# Patient Record
Sex: MALE | Employment: FULL TIME
[De-identification: names, ages, dates, MRNs, and addresses within clinical notes are randomized per-mention and may not be internally consistent; named-entity substitution may affect disease eponyms.]

---

## 2017-07-08 ENCOUNTER — HEALTH MAINTENANCE LETTER (OUTPATIENT)
Age: 62
End: 2017-07-08

## 2017-08-25 ENCOUNTER — OFFICE VISIT (OUTPATIENT)
Dept: FAMILY MEDICINE | Facility: CLINIC | Age: 62
End: 2017-08-25

## 2017-08-25 ENCOUNTER — TELEPHONE (OUTPATIENT)
Dept: FAMILY MEDICINE | Facility: CLINIC | Age: 62
End: 2017-08-25

## 2017-08-25 VITALS
OXYGEN SATURATION: 96 % | HEART RATE: 52 BPM | BODY MASS INDEX: 31.32 KG/M2 | TEMPERATURE: 97.8 F | WEIGHT: 206 LBS | DIASTOLIC BLOOD PRESSURE: 67 MMHG | SYSTOLIC BLOOD PRESSURE: 123 MMHG | RESPIRATION RATE: 16 BRPM

## 2017-08-25 DIAGNOSIS — Z72.9 LIFESTYLE PROBLEMS: ICD-10-CM

## 2017-08-25 DIAGNOSIS — E78.5 HYPERLIPIDEMIA LDL GOAL <130: ICD-10-CM

## 2017-08-25 DIAGNOSIS — I10 ESSENTIAL HYPERTENSION WITH GOAL BLOOD PRESSURE LESS THAN 140/90: ICD-10-CM

## 2017-08-25 DIAGNOSIS — D12.6 TUBULAR ADENOMA OF COLON: ICD-10-CM

## 2017-08-25 DIAGNOSIS — I10 ESSENTIAL HYPERTENSION WITH GOAL BLOOD PRESSURE LESS THAN 140/90: Primary | ICD-10-CM

## 2017-08-25 LAB
ALBUMIN SERPL-MCNC: 4.2 MG/DL (ref 3.5–4.7)
ALP SERPL-CCNC: 69.2 U/L (ref 31.7–110.7)
ALT SERPL-CCNC: 13.7 U/L (ref 0–45)
AST SERPL-CCNC: 13.7 U/L (ref 0–55)
BILIRUB SERPL-MCNC: 1.4 MG/DL (ref 0.2–1.3)
BUN SERPL-MCNC: 10.9 MG/DL (ref 7–21)
CALCIUM SERPL-MCNC: 9.5 MG/DL (ref 8.5–10.1)
CHLORIDE SERPLBLD-SCNC: 106.9 MMOL/L (ref 98–110)
CHOLEST SERPL-MCNC: 246.4 MG/DL (ref 0–200)
CHOLEST/HDLC SERPL: 5.4 {RATIO} (ref 0–5)
CO2 SERPL-SCNC: 28.9 MMOL/L (ref 20–32)
CREAT SERPL-MCNC: 0.9 MG/DL (ref 0.7–1.3)
GFR SERPL CREATININE-BSD FRML MDRD: >90 ML/MIN/1.7 M2
GLUCOSE SERPL-MCNC: 111.8 MG'DL (ref 70–99)
HDLC SERPL-MCNC: 45.3 MG/DL
LDLC SERPL CALC-MCNC: 170 MG/DL (ref 0–129)
POTASSIUM SERPL-SCNC: 4.3 MMOL/DL (ref 3.3–4.5)
PROT SERPL-MCNC: 7.1 G/DL (ref 6.8–8.8)
SODIUM SERPL-SCNC: 142.6 MMOL/L (ref 132.6–141.4)
TRIGL SERPL-MCNC: 157 MG/DL (ref 0–150)
VLDL CHOLESTEROL: 31.4 MG/DL (ref 7–32)

## 2017-08-25 RX ORDER — AMLODIPINE BESYLATE 10 MG/1
10 TABLET ORAL DAILY
Qty: 90 TABLET | Refills: 3 | Status: SHIPPED | OUTPATIENT
Start: 2017-08-25 | End: 2017-09-05

## 2017-08-25 RX ORDER — ATORVASTATIN CALCIUM 40 MG/1
40 TABLET, FILM COATED ORAL AT BEDTIME
Qty: 90 TABLET | Refills: 3 | Status: SHIPPED | OUTPATIENT
Start: 2017-08-25 | End: 2018-07-30

## 2017-08-25 RX ORDER — HYDROCHLOROTHIAZIDE 25 MG/1
25 TABLET ORAL DAILY
Qty: 90 TABLET | Refills: 3 | Status: SHIPPED | OUTPATIENT
Start: 2017-08-25 | End: 2018-07-30

## 2017-08-25 RX ORDER — ATENOLOL 100 MG/1
100 TABLET ORAL DAILY
Qty: 90 TABLET | Refills: 3 | Status: SHIPPED | OUTPATIENT
Start: 2017-08-25 | End: 2017-08-28

## 2017-08-25 NOTE — LETTER
August 25, 2017      Wilian Ness  [address]  [facility] [address]        Dear Wilian,    Thank you for getting your care at [facility]. Please see below for your test results.  Yes - your cholesterol was very high off the atrovastatin (lipitor). Restart it and we will recheck the cholesterol in 6 months when we visit. All other tests were stable.    Resulted Orders   Lipid Cascade ([facility])   Result Value Ref Range    Cholesterol 246.4 (H) 0.0 - 200.0 mg/dL    Cholesterol/HDL Ratio 5.4 (H) 0.0 - 5.0    HDL Cholesterol 45.3 >40.0 mg/dL    LDL Cholesterol Calculated 170 (H) 0 - 129 mg/dL    Triglycerides 157.0 (H) 0.0 - 150.0 mg/dL    VLDL Cholesterol 31.4 7.0 - 32.0 mg/dL   Comprehensive Metabolic Panel ([facility])   Result Value Ref Range    Albumin 4.2 3.5 - 4.7 mg/dL    Alkaline Phosphatase 69.2 31.7 - 110.7 U/L    ALT 13.7 0.0 - 45.0 U/L    AST 13.7 0.0 - 55.0 U/L    Bilirubin Total 1.4 (H) 0.2 - 1.3 mg/dL    Urea Nitrogen 10.9 7.0 - 21.0 mg/dL    Calcium 9.5 8.5 - 10.1 mg/dL    Chloride 106.9 98.0 - 110.0 mmol/L    Carbon Dioxide 28.9 20.0 - 32.0 mmol/L    Creatinine 0.9 0.7 - 1.3 mg/dL    Glucose 111.8 (H) 70.0 - 99.0 mg'dL    Potassium 4.3 3.3 - 4.5 mmol/dL    Sodium 142.6 (H) 132.6 - 141.4 mmol/L    Protein Total 7.1 6.8 - 8.8 g/dL    GFR Estimate >90 >60.0 mL/min/1.7 m2    GFR Estimate If Black >90 >60.0 mL/min/1.7 m2       Sincerely,  Bran Loza MD

## 2017-08-25 NOTE — TELEPHONE ENCOUNTER
[facility] phone call message- medication clarification/question:    Full Medication Name: atenolol (TENORMIN) 100 MG tablet   Dose: Sig: Take 1 tablet (100 mg) by mouth daily    Question: Requesting generic brand.  States per pharmacy, company that makes prescription will not have supply until at least November.  Patient also states that they have a 25 day supply remaining, and requests call once pharmacy receives new prescription.    Pharmacy confirmed as [facility] [address] [place] [address] AT [address]: Yes    OK to leave a message on voice mail? Yes    Primary language: English      needed? No    Call taken on August 25, 2017 at 2:24 PM by Mimi Odonnell

## 2017-08-25 NOTE — MR AVS SNAPSHOT
After Visit Summary   2017    Wilian Ness    MRN: [id number]           Patient Information     Date Of Birth          1955        Visit Information        Provider Department      2017 10:40 AM Bran Loza MD [facility]        Today's Diagnoses     Essential hypertension with goal blood pressure less than 140/90    -  1    Hyperlipidemia LDL goal <130        Tubular adenoma of colon        Lifestyle problems           Follow-ups after your visit        Who to contact     Please call your clinic at [phone] to:    Ask questions about your health    Make or cancel appointments    Discuss your medicines    Learn about your test results    Speak to your doctor   If you have compliments or concerns about an experience at your clinic, or if you wish to file a complaint, please contact [facility] Patient Relations at [phone] or email us at [email or web address]         Additional Information About Your Visit        MyChart Information     [employer] is an electronic gateway that provides easy, online access to your medical records. With [employer], you can request a clinic appointment, read your test results, renew a prescription or communicate with your care team.     To sign up for [employer] visit the website at [email or web address]   You will be asked to enter the access code listed below, as well as some personal information. Please follow the directions to create your username and password.     Your access code is: [email or web address]  Expires: 2017 11:11 AM     Your access code will  in 90 days. If you need help or a new code, please contact your [facility] or call [phone] for assistance.        Care EveryWhere ID     This is your Care EveryWhere ID. This could be used by other organizations to access your [place] medical records  [id number]        Your Vitals Were      Pulse Temperature Respirations Pulse Oximetry BMI (Body Mass Index)       52 97.8  F (36.6  C) (Oral) 16 96% 31.32 kg/m2        Blood Pressure from Last 3 Encounters:   08/25/17 123/67   09/16/16 157/90   09/01/15 (!) 169/94    Weight from Last 3 Encounters:   08/25/17 206 lb (93.4 kg)   09/16/16 205 lb (93 kg)   09/01/15 200 lb 3.2 oz (90.8 kg)              We Performed the Following     Comprehensive Metabolic Panel ([facility])     Hepatitis C antibody     Lipid Cascade ([facility])          Where to get your medicines      These medications were sent to [email or web address] [address] [place] [address] AT [address] [address]    Hours:  24-hours Phone:  [phone]     amLODIPine 10 MG tablet    aspirin 81 MG tablet    atenolol 100 MG tablet    atorvastatin 40 MG tablet    hydrochlorothiazide 25 MG tablet          Primary Care Provider Office Phone # Fax #    Bran Loza [phone] [address] [facility] [address]        Equal Access to Services     MATT DRAKE AH: Hadii riddhi kelly hadasho Soomaali, waaxda luqadaha, qaybta kaalmada adeegyada, brian rubio hayjaxon laws. So [facility] [phone].    ATENCIÓN: Si habla español, tiene a werner disposición servicios gratuitos de asistencia lingüística. [facility] [phone].    We comply with applicable federal civil rights laws and [place] laws. We do not discriminate on the basis of race, color, national origin, age, disability sex, sexual orientation or gender identity.            Thank you!     Thank you for choosing [facility]  for your care. Our goal is always to provide you with excellent care. Hearing back from our patients is one way we can continue to improve our services. Please take a few minutes to complete the written survey that you may receive in the mail after your visit with us. Thank you!             Your Updated  Medication List - Protect others around you: Learn how to safely use, store and throw away your medicines at www.disposemymeds.org.          This list is accurate as of: 8/25/17 11:11 AM.  Always use your most recent med list.                   Brand Name Dispense Instructions for use Diagnosis    amLODIPine 10 MG tablet    NORVASC    90 tablet    Take 1 tablet (10 mg) by mouth daily    Essential hypertension with goal blood pressure less than 140/90       aspirin 81 MG tablet     100 tablet    Take 1 tablet (81 mg) by mouth daily    Essential hypertension with goal blood pressure less than 140/90       atenolol 100 MG tablet    TENORMIN    90 tablet    Take 1 tablet (100 mg) by mouth daily    Essential hypertension with goal blood pressure less than 140/90       atorvastatin 40 MG tablet    LIPITOR    90 tablet    Take 1 tablet (40 mg) by mouth At Bedtime    Hyperlipidemia LDL goal <130       hydrochlorothiazide 25 MG tablet    HYDRODIURIL    90 tablet    Take 1 tablet (25 mg) by mouth daily    Essential hypertension with goal blood pressure less than 140/90

## 2017-08-25 NOTE — PROGRESS NOTES
HPI  62 year old male here for evaluation of several issues.       1. HTN  Out of HCTZ  Good compliance  No CP, TIA, other sxs of CV disease    2. CHol  Out of meds x 2 weeks    3. Etoh  Multiple beers each evening  No legal, work, relationship issues    4. Colon polyps  Due for colonoscopy  After discussion will postpone until next year      ROS  6 point ROS neg other than the symptoms noted above in the HPI.    MEDS  Current Outpatient Prescriptions   Medication     amLODIPine (NORVASC) 10 MG tablet     atenolol (TENORMIN) 100 MG tablet     aspirin 81 MG tablet     atorvastatin (LIPITOR) 40 MG tablet     hydrochlorothiazide (HYDRODIURIL) 25 MG tablet     No current facility-administered medications for this visit.          SOC      FHx  Family History   Problem Relation Age of Onset     Cardiovascular Father 81     Pulmonary embolus     Cancer - colorectal No family hx of      Prostate Cancer No family hx of        PHYSICAL EXAMINATION:  Vital signs: /67  Pulse 52  Temp 97.8  F (36.6  C) (Oral)  Resp 16  Wt 206 lb (93.4 kg)  SpO2 96%  BMI 31.32 kg/m2    GENERAL:: healthy, alert and no distress  EYES: Eyes grossly normal to inspection, extraocular movements - intact, and PERRL  HENT: ear canals- normal; TMs- normal; Nose- normal; Mouth- no ulcers, no lesions  NECK: no tenderness, no adenopathy, no asymmetry, 3 CM SOFT MOBILE MASS RIGHT NECK, no stiffness; thyroid- normal to palpation  RESP: lungs clear to auscultation - no rales, no rhonchi, no wheezes  CV: regular rates and rhythm, normal S1 S2, no S3 or S4 and no murmur, no click or rub -  ABDOMEN: soft, no tenderness, no  hepatosplenomegaly, no masses, normal bowel sounds  MS: extremities- no gross deformities noted, no edema      LABS    Off lipitor x 2 weeks  Results for orders placed or performed in visit on 08/25/17   Lipid Cascade ([facility])   Result Value Ref Range    Cholesterol 246.4 (H) 0.0 - 200.0 mg/dL    Cholesterol/HDL Ratio 5.4 (H) 0.0  - 5.0    HDL Cholesterol 45.3 >40.0 mg/dL    LDL Cholesterol Calculated 170 (H) 0 - 129 mg/dL    Triglycerides 157.0 (H) 0.0 - 150.0 mg/dL    VLDL Cholesterol 31.4 7.0 - 32.0 mg/dL   Comprehensive Metabolic Panel ([facility])   Result Value Ref Range    Albumin 4.2 3.5 - 4.7 mg/dL    Alkaline Phosphatase 69.2 31.7 - 110.7 U/L    ALT 13.7 0.0 - 45.0 U/L    AST 13.7 0.0 - 55.0 U/L    Bilirubin Total 1.4 (H) 0.2 - 1.3 mg/dL    Urea Nitrogen 10.9 7.0 - 21.0 mg/dL    Calcium 9.5 8.5 - 10.1 mg/dL    Chloride 106.9 98.0 - 110.0 mmol/L    Carbon Dioxide 28.9 20.0 - 32.0 mmol/L    Creatinine 0.9 0.7 - 1.3 mg/dL    Glucose 111.8 (H) 70.0 - 99.0 mg'dL    Potassium 4.3 3.3 - 4.5 mmol/dL    Sodium 142.6 (H) 132.6 - 141.4 mmol/L    Protein Total 7.1 6.8 - 8.8 g/dL    GFR Estimate >90 >60.0 mL/min/1.7 m2    GFR Estimate If Black >90 >60.0 mL/min/1.7 m2           ASSESSMENT/PLAN    1. Essential hypertension with goal blood pressure less than 140/90  BP controlled  Check labs  Refill x 1 year    - Comprehensive Metabolic Panel ([facility])  - amLODIPine (NORVASC) 10 MG tablet; Take 1 tablet (10 mg) by mouth daily  Dispense: 90 tablet; Refill: 3  - atenolol (TENORMIN) 100 MG tablet; Take 1 tablet (100 mg) by mouth daily  Dispense: 90 tablet; Refill: 3  - aspirin 81 MG tablet; Take 1 tablet (81 mg) by mouth daily  Dispense: 100 tablet; Refill: 3  - hydrochlorothiazide (HYDRODIURIL) 25 MG tablet; Take 1 tablet (25 mg) by mouth daily  Dispense: 90 tablet; Refill: 3    2. Hyperlipidemia LDL goal <130  Out of meds x 2 weeks  Likely LDL will be high    - Lipid Cascade ([facility])  - atorvastatin (LIPITOR) 40 MG tablet; Take 1 tablet (40 mg) by mouth At Bedtime  Dispense: 90 tablet; Refill: 3    3. Tubular adenoma of colon  Due to colonoscopy after 2 years  Will postpone until 2018 per pt request    4. Lifestyle problems  - Hepatitis C antibody    5. RTC 6 months  Lipids on meds      ANGELITA VALLEJO

## 2017-08-25 NOTE — LETTER
August 29, 2017      Wilian Ness  [address]  [facility] [address]        Dear Wilian,    We have tried reaching you by telephone to inform you that we followed up with your pharmacy in regards to your Atenolol prescription as requested.     [facility] does not carry this medication due to back order. I checked with [facility] and they are able to get this medication for you. I have sent the prescription to \[facility] for them to process. I would recommend calling the pharmacy to check to see when it is ready to be picked up.      Sincerely,    Lise Lowery RN

## 2017-08-26 NOTE — TELEPHONE ENCOUNTER
OK to give generic atenolol (but I think that is what he getting already)  Please call pharmacy - give verbal order for generic atenolol 100mg QD  See if there is another barrier    If they do not have atenolol supplies, will switch to Toprol XL 100mg QD    Then call patient with outcome of conversation.    Wesley

## 2017-08-27 LAB — HCV AB SERPL QL IA: NONREACTIVE

## 2017-08-28 RX ORDER — ATENOLOL 100 MG/1
100 TABLET ORAL DAILY
Qty: 90 TABLET | Refills: 3 | Status: SHIPPED | OUTPATIENT
Start: 2017-08-28 | End: 2017-11-10

## 2017-08-28 NOTE — TELEPHONE ENCOUNTER
RN called [facility]. Issue with medication is not that prescription needs to be switched to generic but the pharmacy does not carry the medication at all due to backorder until November.    RN discussed with [facility] team. [facility] carried 50 mg tablets but can order in 100 mg from other [facility].     RN will send prescription to [facility] per standing protocol. RN unable to get in contact with the patient as number provided as home phone number is not in service. No alternative number provided to call the patient. MyChart not active.     Lise Lowery, CECILIA

## 2017-09-05 DIAGNOSIS — I10 ESSENTIAL HYPERTENSION WITH GOAL BLOOD PRESSURE LESS THAN 140/90: ICD-10-CM

## 2017-09-05 RX ORDER — AMLODIPINE BESYLATE 10 MG/1
10 TABLET ORAL DAILY
Qty: 90 TABLET | Refills: 3 | Status: SHIPPED | OUTPATIENT
Start: 2017-09-05 | End: 2017-11-30

## 2017-09-12 DIAGNOSIS — I10 ESSENTIAL HYPERTENSION WITH GOAL BLOOD PRESSURE LESS THAN 140/90: ICD-10-CM

## 2017-09-12 NOTE — TELEPHONE ENCOUNTER
Request for medication refill:    Date of last visit at clinic: 8-25-17    Please complete refill if appropriate and CLOSE ENCOUNTER.    Closing the encounter signifies the refill is complete.    If refill has been denied, please complete the smart phrase .smirefuse and route it to the [facility] RN TRIAGE pool to inform the patient and the pharmacy.    Niurka Ware MA

## 2017-11-10 DIAGNOSIS — I10 ESSENTIAL HYPERTENSION WITH GOAL BLOOD PRESSURE LESS THAN 140/90: ICD-10-CM

## 2017-11-10 RX ORDER — ATENOLOL 100 MG/1
100 TABLET ORAL DAILY
Qty: 90 TABLET | Refills: 3 | Status: SHIPPED | OUTPATIENT
Start: 2017-11-10 | End: 2018-05-07

## 2017-11-10 NOTE — TELEPHONE ENCOUNTER
Request for medication refill:    Date of last visit at clinic: 8-25-17    Please complete refill if appropriate and CLOSE ENCOUNTER.    Closing the encounter signifies the refill is complete.    If refill has been denied, please complete the smart phrase .smirefuse and route it to the [facility] RN TRIAGE pool to inform the patient and the pharmacy.    Niurka Ware MA

## 2017-11-30 DIAGNOSIS — I10 ESSENTIAL HYPERTENSION WITH GOAL BLOOD PRESSURE LESS THAN 140/90: ICD-10-CM

## 2017-11-30 RX ORDER — AMLODIPINE BESYLATE 10 MG/1
10 TABLET ORAL DAILY
Qty: 90 TABLET | Refills: 3 | Status: SHIPPED | OUTPATIENT
Start: 2017-11-30 | End: 2018-10-10

## 2017-11-30 NOTE — TELEPHONE ENCOUNTER
Request for medication refill:    Date of last visit at clinic: 08/25/2017    Please complete refill if appropriate and CLOSE ENCOUNTER.    Closing the encounter signifies the refill is complete.    If refill has been denied, please complete the smart phrase .smirefuse and route it to the [facility] RN TRIAGE pool to inform the patient and the pharmacy.    Tiff Galicia MA

## 2018-05-07 DIAGNOSIS — I10 ESSENTIAL HYPERTENSION WITH GOAL BLOOD PRESSURE LESS THAN 140/90: ICD-10-CM

## 2018-05-07 RX ORDER — ATENOLOL 100 MG/1
100 TABLET ORAL DAILY
Qty: 90 TABLET | Refills: 3 | Status: SHIPPED | OUTPATIENT
Start: 2018-05-07 | End: 2018-10-10

## 2018-05-07 NOTE — TELEPHONE ENCOUNTER
Request for medication refill:    Date of last visit at clinic: 8/25/17    Please complete refill if appropriate and CLOSE ENCOUNTER.    Closing the encounter signifies the refill is complete.    If refill has been denied, please complete the smart phrase .smirefuse and route it to the [facility] RN TRIAGE pool to inform the patient and the pharmacy.    Mirtha Singh, CMA

## 2018-07-15 ENCOUNTER — HEALTH MAINTENANCE LETTER (OUTPATIENT)
Age: 63
End: 2018-07-15

## 2018-07-30 DIAGNOSIS — I10 ESSENTIAL HYPERTENSION WITH GOAL BLOOD PRESSURE LESS THAN 140/90: ICD-10-CM

## 2018-07-30 DIAGNOSIS — E78.5 HYPERLIPIDEMIA LDL GOAL <130: ICD-10-CM

## 2018-07-30 RX ORDER — HYDROCHLOROTHIAZIDE 25 MG/1
25 TABLET ORAL DAILY
Qty: 30 TABLET | Refills: 0 | Status: SHIPPED | OUTPATIENT
Start: 2018-07-30 | End: 2018-08-28

## 2018-07-30 RX ORDER — ATORVASTATIN CALCIUM 40 MG/1
40 TABLET, FILM COATED ORAL AT BEDTIME
Qty: 30 TABLET | Refills: 0 | Status: SHIPPED | OUTPATIENT
Start: 2018-07-30 | End: 2018-08-28

## 2018-07-30 NOTE — TELEPHONE ENCOUNTER

## 2018-08-28 DIAGNOSIS — E78.5 HYPERLIPIDEMIA LDL GOAL <130: ICD-10-CM

## 2018-08-28 DIAGNOSIS — I10 ESSENTIAL HYPERTENSION WITH GOAL BLOOD PRESSURE LESS THAN 140/90: ICD-10-CM

## 2018-08-28 RX ORDER — HYDROCHLOROTHIAZIDE 25 MG/1
25 TABLET ORAL DAILY
Qty: 15 TABLET | Refills: 0 | Status: SHIPPED | OUTPATIENT
Start: 2018-08-28 | End: 2018-11-09

## 2018-08-28 RX ORDER — ATORVASTATIN CALCIUM 40 MG/1
40 TABLET, FILM COATED ORAL AT BEDTIME
Qty: 15 TABLET | Refills: 0 | Status: SHIPPED | OUTPATIENT
Start: 2018-08-28 | End: 2018-10-10

## 2018-08-28 NOTE — LETTER
Mr. Wilian Ness   [address] [address]         Dear Mr. Wilian Ness,   Dr. Loza would like to see you for a follow up appointment. Any questions please call clinic at [phone].    Your health is important to us! [facility] Staff for [facility]  Hours:  Monday- Friday 8:00 am - 5:00 pm   Phone Number: [phone]

## 2018-09-20 DIAGNOSIS — I10 ESSENTIAL HYPERTENSION WITH GOAL BLOOD PRESSURE LESS THAN 140/90: ICD-10-CM

## 2018-09-20 NOTE — TELEPHONE ENCOUNTER

## 2018-10-10 ENCOUNTER — OFFICE VISIT (OUTPATIENT)
Dept: FAMILY MEDICINE | Facility: CLINIC | Age: 63
End: 2018-10-10
Payer: COMMERCIAL

## 2018-10-10 VITALS
SYSTOLIC BLOOD PRESSURE: 131 MMHG | RESPIRATION RATE: 12 BRPM | BODY MASS INDEX: 32.39 KG/M2 | HEIGHT: 67 IN | HEART RATE: 55 BPM | WEIGHT: 206.4 LBS | OXYGEN SATURATION: 97 % | DIASTOLIC BLOOD PRESSURE: 67 MMHG

## 2018-10-10 DIAGNOSIS — I10 ESSENTIAL HYPERTENSION WITH GOAL BLOOD PRESSURE LESS THAN 140/90: ICD-10-CM

## 2018-10-10 DIAGNOSIS — L21.9 SEBORRHEIC DERMATITIS: ICD-10-CM

## 2018-10-10 DIAGNOSIS — N40.1 BENIGN PROSTATIC HYPERPLASIA WITH NOCTURIA: ICD-10-CM

## 2018-10-10 DIAGNOSIS — E78.5 HYPERLIPIDEMIA LDL GOAL <130: ICD-10-CM

## 2018-10-10 DIAGNOSIS — R35.1 BENIGN PROSTATIC HYPERPLASIA WITH NOCTURIA: ICD-10-CM

## 2018-10-10 DIAGNOSIS — R60.9 SWELLING: ICD-10-CM

## 2018-10-10 DIAGNOSIS — Z00.00 ENCOUNTER FOR ROUTINE ADULT HEALTH EXAMINATION WITHOUT ABNORMAL FINDINGS: Primary | ICD-10-CM

## 2018-10-10 LAB
ALBUMIN SERPL-MCNC: 4.5 MG/DL (ref 3.5–4.7)
ALP SERPL-CCNC: 73.2 U/L (ref 31.7–110.7)
ALT SERPL-CCNC: 22.9 U/L (ref 0–45)
AST SERPL-CCNC: 20.9 U/L (ref 0–55)
BILIRUB SERPL-MCNC: 2 MG/DL (ref 0.2–1.3)
BUN SERPL-MCNC: 11.7 MG/DL (ref 7–21)
CALCIUM SERPL-MCNC: 9.9 MG/DL (ref 8.5–10.1)
CHLORIDE SERPLBLD-SCNC: 101 MMOL/L (ref 98–110)
CHOLEST SERPL-MCNC: 185.9 MG/DL (ref 0–200)
CHOLEST/HDLC SERPL: 3.7 {RATIO} (ref 0–5)
CO2 SERPL-SCNC: 28.7 MMOL/L (ref 20–32)
CREAT SERPL-MCNC: 0.9 MG/DL (ref 0.7–1.3)
GFR SERPL CREATININE-BSD FRML MDRD: >90 ML/MIN/1.7 M2
GLUCOSE SERPL-MCNC: 125.9 MG'DL (ref 70–99)
HDLC SERPL-MCNC: 50.1 MG/DL
LDLC SERPL CALC-MCNC: 104 MG/DL (ref 0–129)
POTASSIUM SERPL-SCNC: 3.7 MMOL/DL (ref 3.3–4.5)
PROT SERPL-MCNC: 7.3 G/DL (ref 6.8–8.8)
PSA SERPL-ACNC: 0.77 UG/L (ref 0–4)
SODIUM SERPL-SCNC: 141.1 MMOL/L (ref 132.6–141.4)
TRIGL SERPL-MCNC: 159.3 MG/DL (ref 0–150)
VLDL CHOLESTEROL: 31.9 MG/DL (ref 7–32)

## 2018-10-10 RX ORDER — FUROSEMIDE 20 MG
20 TABLET ORAL DAILY
Qty: 30 TABLET | Refills: 1 | Status: SHIPPED | OUTPATIENT
Start: 2018-10-10 | End: 2018-11-09

## 2018-10-10 RX ORDER — AMLODIPINE BESYLATE 10 MG/1
10 TABLET ORAL DAILY
Qty: 90 TABLET | Refills: 3 | Status: SHIPPED | OUTPATIENT
Start: 2018-10-10 | End: 2018-11-09

## 2018-10-10 RX ORDER — ATENOLOL 100 MG/1
100 TABLET ORAL DAILY
Qty: 90 TABLET | Refills: 3 | Status: SHIPPED | OUTPATIENT
Start: 2018-10-10 | End: 2019-10-30

## 2018-10-10 RX ORDER — KETOCONAZOLE 20 MG/ML
SHAMPOO TOPICAL
Qty: 120 ML | Refills: 1 | Status: SHIPPED | OUTPATIENT
Start: 2018-10-10 | End: 2020-01-03

## 2018-10-10 RX ORDER — ATORVASTATIN CALCIUM 40 MG/1
40 TABLET, FILM COATED ORAL AT BEDTIME
Qty: 90 TABLET | Refills: 3 | Status: SHIPPED | OUTPATIENT
Start: 2018-10-10 | End: 2019-09-26

## 2018-10-10 RX ORDER — TAMSULOSIN HYDROCHLORIDE 0.4 MG/1
0.4 CAPSULE ORAL DAILY
Qty: 30 CAPSULE | Refills: 11 | Status: SHIPPED | OUTPATIENT
Start: 2018-10-10 | End: 2018-12-14

## 2018-10-10 NOTE — PATIENT INSTRUCTIONS
1. Encounter for routine adult health examination without abnormal findings  - Fecal colorectal cancer screen FIT; Future    2. Benign prostatic hyperplasia with nocturia  - Prostate spec antigen screen  - tamsulosin (FLOMAX) 0.4 MG capsule; Take 1 capsule (0.4 mg) by mouth daily  Dispense: 30 capsule; Refill: 11    3. Hyperlipidemia LDL goal <130  - Lipid [place] ([facility])  - atorvastatin (LIPITOR) 40 MG tablet; Take 1 tablet (40 mg) by mouth At Bedtime  Dispense: 90 tablet; Refill: 3    5. Essential hypertension with goal blood pressure less than 140/90  - amLODIPine (NORVASC) 10 MG tablet; Take 1 tablet (10 mg) by mouth daily  Dispense: 90 tablet; Refill: 3  - atenolol (TENORMIN) 100 MG tablet; Take 1 tablet (100 mg) by mouth daily  Dispense: 90 tablet; Refill: 3  - furosemide (LASIX) 20 MG tablet; Take 1 tablet (20 mg) by mouth daily  Dispense: 30 tablet; Refill: 1    6. Seborrheic dermatitis  Head and Shoulder shampoo x 1 month  If no respoinse, ketoconazole shampoo    - ketoconazole (NIZORAL) 2 % shampoo; Apply to the affected area and wash off after 5 minutes.  Dispense: 120 mL; Refill: 1          Preventive Health Recommendations  Male Ages 50 - 64    Yearly exam:             See your health care provider every year in order to  o   Review health changes.   o   Discuss preventive care.    o   Review your medicines if your doctor has prescribed any.     Have a cholesterol test every 5 years, or more frequently if you are at risk for high cholesterol/heart disease.     Have a diabetes test (fasting glucose) every three years. If you are at risk for diabetes, you should have this test more often.     Have a colonoscopy at age 50, or have a yearly FIT test (stool test). These exams will check for colon cancer.      Talk with your health care provider about whether or not a prostate cancer screening test (PSA) is right for you.    You should be tested each year for STDs (sexually transmitted diseases), if you re  at risk.     Shots: Get a flu shot each year. Get a tetanus shot every 10 years.     Nutrition:    Eat at least 5 servings of fruits and vegetables daily.     Eat whole-grain bread, whole-wheat pasta and brown rice instead of white grains and rice.     Get adequate Calcium and Vitamin D.     Lifestyle    Exercise for at least 150 minutes a week (30 minutes a day, 5 days a week). This will help you control your weight and prevent disease.     Limit alcohol to one drink per day.     No smoking.     Wear sunscreen to prevent skin cancer.     See your dentist every six months for an exam and cleaning.     See your eye doctor every 1 to 2 years.

## 2018-10-10 NOTE — PROGRESS NOTES
Male Physical Note          HPI         Concerns today:   1. Swelling legs - bilateral  2. Aspirin - citing article that has too many SE  3. Scabs on back of scalp  Estranged son with ringworm, impetigo    4. Urine problems  Nocturia  Weak stream        Patient Active Problem List   Diagnosis     Health care home, active care coordination     Hypertension     Hyperlipidemia LDL goal <130     Tubular adenoma of colon     Neck mass       History reviewed. No pertinent past medical history.       Family History   Problem Relation Age of Onset     Cardiovascular Father 81     Pulmonary embolus     Cancer - colorectal No family hx of      Prostate Cancer No family hx of               Review of Systems:     Review of Systems:  CONSTITUTIONAL: NEGATIVE for fever, chills, change in weight  INTEGUMENTARY/SKIN: NEGATIVE for worrisome rashes, moles or lesions  EYES: NEGATIVE for vision changes or irritation  ENT/MOUTH: NEGATIVE for ear, mouth and throat problems  RESP: NEGATIVE for significant cough or SOB  BREAST: NEGATIVE for masses, tenderness or discharge  CV: NEGATIVE for chest pain, palpitations or peripheral edema  GI: NEGATIVE for nausea, abdominal pain, heartburn, or change in bowel habits  : NEGATIVE for frequency, dysuria, or hematuria  MUSCULOSKELETAL: NEGATIVE for significant arthralgias or myalgia  NEURO: NEGATIVE for weakness, dizziness or paresthesias  ENDOCRINE: NEGATIVE for temperature intolerance, skin/hair changes  HEME/ALLERGY: NEGATIVE for bleeding problems  PSYCHIATRIC: NEGATIVE for changes in mood or affect           Social History     Social History     Social History     Marital status: Single     Spouse name: N/A     Number of children: N/A     Years of education: N/A     Occupational History     [employer] crew [employer]     Social History Main Topics     Smoking status: Former Smoker     Smokeless tobacco: Never Used     Alcohol use Yes      Comment: daily     Drug use: Not on file     Sexual  "activity: Not on file     Other Topics Concern     Not on file     Social History Narrative       Marital Status:Cohabiting   Who lives in your household?     Has anyone hurt you physically, for example by pushing, hitting, slapping or kicking you or forcing you to have sex? Denies  Do you feel threatened or controlled by a partner, ex-partner or anyone in your life? Denies    Sexual Health     Sexual concerns: No   STI History: 40 yrs ago      Recommended Screening     Cholesterol Level (>46 yo or at risk):  Recommended and patient accepted testing. and   ASA use (>3% risk in 5 y):  Stopped based on recent study in primary prevention  Colon CA Screening (>50-75 ):  Recommended and patient accepted testing.             Physical Exam:     Vitals: /67  Pulse 55  Resp 12  Ht 5' 7.01\" (170.2 cm)  Wt 206 lb 6.4 oz (93.6 kg)  SpO2 97%  BMI 32.32 kg/m2  BMI= Body mass index is 32.32 kg/(m^2).     Wt Readings from Last 5 Encounters:   10/10/18 206 lb 6.4 oz (93.6 kg)   08/25/17 206 lb (93.4 kg)   09/16/16 205 lb (93 kg)   09/01/15 200 lb 3.2 oz (90.8 kg)   05/27/14 203 lb 6.4 oz (92.3 kg)         GENERAL: healthy, alert and no distress  EYES: Eyes grossly normal to inspection, extraocular movements - intact, and PERRL  HENT: ear canals- normal; TMs- normal; Nose- normal; Mouth- no ulcers, no lesions  NECK: no tenderness, no adenopathy, no asymmetry, soft mobile 5cm mass in R neck, no stiffness; thyroid- normal to palpation  RESP: lungs clear to auscultation - no rales, no rhonchi, no wheezes  CV: regular rates and rhythm, normal S1 S2, no S3 or S4 and no murmur, no click or rub -  ABDOMEN: soft, no tenderness, no  hepatosplenomegaly, no masses, normal bowel sounds  MS: extremities- no gross deformities noted, no edema  SKIN: no suspicious lesions, no rashes  NEURO: strength and tone- normal, sensory exam- grossly normal, mentation- intact, speech- normal, reflexes- symmetric  BACK: no CVA tenderness, no " paralumbar tenderness  RECTAL- male: no masses, no hemorhoids, Prostate- symmetric, no  nodularity, no masses, minimal hypertrophy  PSYCH: Alert and oriented times 3; speech- coherent , normal rate and volume; able to articulate logical thoughts, able to abstract reason, no tangential thoughts, no hallucinations or delusions, affect- normal  LYMPHATICS: ant. cervical- normal, post. cervical- normal, axillary- normal, supraclavicular- normal, inguinal- normal    Assessment and Plan       1. Encounter for routine adult health examination without abnormal findings  Stop Aspirin  FIT test (rather than colonoscopy)    - Fecal colorectal cancer screen FIT; Future    2. Benign prostatic hyperplasia with nocturia  Trial for flomax    - Prostate spec antigen screen  - tamsulosin (FLOMAX) 0.4 MG capsule; Take 1 capsule (0.4 mg) by mouth daily  Dispense: 30 capsule; Refill: 11    3. Swelling  Possible SE amlodipine  Trial of lasix instead of hydrochlorothiazide    - Comprehensive Metabolic Panel ([facility])  - furosemide (LASIX) 20 MG tablet; Take 1 tablet (20 mg) by mouth daily  Dispense: 30 tablet; Refill: 1      4. Hyperlipidemia LDL goal <130  - Lipid [place] ([facility])  - Comprehensive Metabolic Panel ([facility])  - atorvastatin (LIPITOR) 40 MG tablet; Take 1 tablet (40 mg) by mouth At Bedtime  Dispense: 90 tablet; Refill: 3    5. Essential hypertension with goal blood pressure less than 140/90  - Comprehensive Metabolic Panel ([facility])  - amLODIPine (NORVASC) 10 MG tablet; Take 1 tablet (10 mg) by mouth daily  Dispense: 90 tablet; Refill: 3  - atenolol (TENORMIN) 100 MG tablet; Take 1 tablet (100 mg) by mouth daily  Dispense: 90 tablet; Refill: 3  - furosemide (LASIX) 20 MG tablet; Take 1 tablet (20 mg) by mouth daily  Dispense: 30 tablet; Refill: 1    6. Seborrheic dermatitis  Head and shoulders x 1 month  If no help, nizoral shampoo    - ketoconazole (NIZORAL) 2 % shampoo; Apply to the affected area and wash off after 5  minutes.  Dispense: 120 mL; Refill: 1    7. RTC 1 month  Check swelling  Consider stop amlodipine   Reconsider colonoscopy - had adenoma        Options for treatment and follow-up care were reviewed with the patient. Wilian Ness and/or guardian engaged in the decision making process and verbalized understanding of the options discussed and agreed with the final plan.    Bran Loza MD

## 2018-10-10 NOTE — LETTER
October 12, 2018      Wilian Ness  [address]  [facility] [address]        Dear Wilian,    Thank you for getting your care at [facility]. Please see below for your test results.  Your PSA (prostate test) is good. Hope the flomax medication works.  Your cholesterol is good  Your glucose is slightly elevated. We will check that again at your next visit.      Resulted Orders   Prostate spec antigen screen   Result Value Ref Range    PSA 0.77 0 - 4 ug/L      Comment:      Assay Method:  Chemiluminescence using Siemens Vista analyzer   Lipid Cascade (\[facility]\"")   Result Value Ref Range    Cholesterol 185.9 0.0 - 200.0 mg/dL    Cholesterol/HDL Ratio 3.7 0.0 - 5.0    HDL Cholesterol 50.1 >40.0 mg/dL    LDL Cholesterol Calculated 104 0 - 129 mg/dL    Triglycerides 159.3 (H) 0.0 - 150.0 mg/dL    VLDL Cholesterol 31.9 7.0 - 32.0 mg/dL   Comprehensive Metabolic Panel (\[facility]\"")   Result Value Ref Range    Albumin 4.5 3.5 - 4.7 mg/dL    Alkaline Phosphatase 73.2 31.7 - 110.7 U/L    ALT 22.9 0.0 - 45.0 U/L    AST 20.9 0.0 - 55.0 U/L    Bilirubin Total 2.0 (H) 0.2 - 1.3 mg/dL    Urea Nitrogen 11.7 7.0 - 21.0 mg/dL    Calcium 9.9 8.5 - 10.1 mg/dL    Chloride 101.0 98.0 - 110.0 mmol/L    Carbon Dioxide 28.7 20.0 - 32.0 mmol/L    Creatinine 0.9 0.7 - 1.3 mg/dL    Glucose 125.9 (H) 70.0 - 99.0 mg'dL    Potassium 3.7 3.3 - 4.5 mmol/dL    Sodium 141.1 132.6 - 141.4 mmol/L    Protein Total 7.3 6.8 - 8.8 g/dL    GFR Estimate >90 >60.0 mL/min/1.7 m2    GFR Estimate If Black >90 >60.0 mL/min/1.7 m2           Sincerely,    Bran Loza MD

## 2018-10-10 NOTE — MR AVS SNAPSHOT
After Visit Summary   10/10/2018    Wilian Ness    MRN: [id number]           Patient Information     Date Of Birth          1955        Visit Information        Provider Department      10/10/2018 10:20 AM Bran Loza MD [facility]        Today's Diagnoses     Encounter for routine adult health examination without abnormal findings    -  1    Benign prostatic hyperplasia with nocturia        Hyperlipidemia LDL goal <130        Essential hypertension        Essential hypertension with goal blood pressure less than 140/90        Seborrheic dermatitis          Care Instructions    1. Encounter for routine adult health examination without abnormal findings  - Fecal colorectal cancer screen FIT; Future    2. Benign prostatic hyperplasia with nocturia  - Prostate spec antigen screen  - tamsulosin (FLOMAX) 0.4 MG capsule; Take 1 capsule (0.4 mg) by mouth daily  Dispense: 30 capsule; Refill: 11    3. Hyperlipidemia LDL goal <130  - Lipid [place] ([facility])  - atorvastatin (LIPITOR) 40 MG tablet; Take 1 tablet (40 mg) by mouth At Bedtime  Dispense: 90 tablet; Refill: 3    5. Essential hypertension with goal blood pressure less than 140/90  - amLODIPine (NORVASC) 10 MG tablet; Take 1 tablet (10 mg) by mouth daily  Dispense: 90 tablet; Refill: 3  - atenolol (TENORMIN) 100 MG tablet; Take 1 tablet (100 mg) by mouth daily  Dispense: 90 tablet; Refill: 3  - furosemide (LASIX) 20 MG tablet; Take 1 tablet (20 mg) by mouth daily  Dispense: 30 tablet; Refill: 1    6. Seborrheic dermatitis  Head and Shoulder shampoo x 1 month  If no respoinse, ketoconazole shampoo    - ketoconazole (NIZORAL) 2 % shampoo; Apply to the affected area and wash off after 5 minutes.  Dispense: 120 mL; Refill: 1          Preventive Health Recommendations  Male Ages 50 - 64    Yearly exam:             See your health care provider every year in order to  o   Review health changes.   o   Discuss preventive care.     o   Review your medicines if your doctor has prescribed any.     Have a cholesterol test every 5 years, or more frequently if you are at risk for high cholesterol/heart disease.     Have a diabetes test (fasting glucose) every three years. If you are at risk for diabetes, you should have this test more often.     Have a colonoscopy at age 50, or have a yearly FIT test (stool test). These exams will check for colon cancer.      Talk with your health care provider about whether or not a prostate cancer screening test (PSA) is right for you.    You should be tested each year for STDs (sexually transmitted diseases), if you re at risk.     Shots: Get a flu shot each year. Get a tetanus shot every 10 years.     Nutrition:    Eat at least 5 servings of fruits and vegetables daily.     Eat whole-grain bread, whole-wheat pasta and brown rice instead of white grains and rice.     Get adequate Calcium and Vitamin D.     Lifestyle    Exercise for at least 150 minutes a week (30 minutes a day, 5 days a week). This will help you control your weight and prevent disease.     Limit alcohol to one drink per day.     No smoking.     Wear sunscreen to prevent skin cancer.     See your dentist every six months for an exam and cleaning.     See your eye doctor every 1 to 2 years.            Follow-ups after your visit        Future tests that were ordered for you today     Open Future Orders        Priority Expected Expires Ordered    Fecal colorectal cancer screen FIT Routine 10/31/2018 1/2/2019 10/10/2018            Who to contact     Please call your clinic at [phone] to:    Ask questions about your health    Make or cancel appointments    Discuss your medicines    Learn about your test results    Speak to your doctor            Additional Information About Your Visit        BioVex Information     [employer] is an electronic gateway that provides easy, online access to your medical records. With Bio[employer], you can request a clinic  "appointment, read your test results, renew a prescription or communicate with your care team.     To sign up for Qooplt visit the website at [email or web address]   You will be asked to enter the access code listed below, as well as some personal information. Please follow the directions to create your username and password.     Your access code is: [email or web address]  Expires: 2019 11:19 AM     Your access code will  in 90 days. If you need help or a new code, please contact your [facility] or call [phone] for assistance.        Care EveryWhere ID     This is your Care EveryWhere ID. This could be used by other organizations to access your [facility] medical records  [id number]        Your Vitals Were     Pulse Respirations Height Pulse Oximetry BMI (Body Mass Index)       55 12 5' 7.01\" (170.2 cm) 97% 32.32 kg/m2        Blood Pressure from Last 3 Encounters:   10/10/18 131/67   17 123/67   16 157/90    Weight from Last 3 Encounters:   10/10/18 206 lb 6.4 oz (93.6 kg)   17 206 lb (93.4 kg)   16 205 lb (93 kg)              We Performed the Following     Comprehensive Metabolic Panel ([facility])     Lipid Cascade ([facility])     Prostate spec antigen screen          Today's Medication Changes          These changes are accurate as of 10/10/18 11:19 AM.  If you have any questions, ask your nurse or doctor.               Start taking these medicines.        Dose/Directions    furosemide 20 MG tablet   Commonly known as:  LASIX   Used for:  Essential hypertension with goal blood pressure less than 140/90   Started by:  Bran Loza MD        Dose:  20 mg   Take 1 tablet (20 mg) by mouth daily   Quantity:  30 tablet   Refills:  1       ketoconazole 2 % shampoo   Commonly known as:  NIZORAL   Used for:  Seborrheic dermatitis   Started by:  Bran Loza MD        Apply to the affected area and wash off after 5 minutes.   Quantity:  120 mL "   Refills:  1       tamsulosin 0.4 MG capsule   Commonly known as:  FLOMAX   Used for:  Benign prostatic hyperplasia with nocturia   Started by:  Bran Loza MD        Dose:  0.4 mg   Take 1 capsule (0.4 mg) by mouth daily   Quantity:  30 capsule   Refills:  11         Stop taking these medicines if you haven't already. Please contact your care team if you have questions.     aspirin 81 MG tablet   Stopped by:  Bran Loza MD                Where to get your medicines      These [facility] to [facility] [address] [place] [address] AT [address] [address]     Phone:  [phone]     amLODIPine 10 MG tablet    atenolol 100 MG tablet    atorvastatin 40 MG tablet    furosemide 20 MG tablet    ketoconazole 2 % shampoo    tamsulosin 0.4 MG capsule                Primary Care Provider Office Phone # Fax #    Bran Loza [phone] [address] [facility] [address]        Equal Access to Services     [facility]: Hadii aad ku hadasho Soomaali, waaxda luqadaha, qaybta kaalmada adeegyada, waxay zeein hayjaxon kinney . So [facility] [phone].    ATENCIÓN: Si habla español, tiene a werner disposición servicios gratuitos de asistencia lingüística. [facility] [phone].    We comply with applicable federal civil rights laws and [place] laws. We do not discriminate on the basis of race, color, national origin, age, disability, sex, sexual orientation, or gender identity.            Thank you!     Thank you for choosing [facility]  for your care. Our goal is always to provide you with excellent care. Hearing back from our patients is one way we can continue to improve our services. Please take a few minutes to complete the written survey that you may receive in the mail after your visit with us. Thank you!             Your Updated Medication List - Protect  others around you: Learn how to safely use, store and throw away your medicines at www.disposemymeds.org.          This list is accurate as of 10/10/18 11:19 AM.  Always use your most recent med list.                   Brand Name Dispense Instructions for use Diagnosis    amLODIPine 10 MG tablet    NORVASC    90 tablet    Take 1 tablet (10 mg) by mouth daily    Essential hypertension with goal blood pressure less than 140/90       atenolol 100 MG tablet    TENORMIN    90 tablet    Take 1 tablet (100 mg) by mouth daily    Essential hypertension with goal blood pressure less than 140/90       atorvastatin 40 MG tablet    LIPITOR    90 tablet    Take 1 tablet (40 mg) by mouth At Bedtime    Hyperlipidemia LDL goal <130       furosemide 20 MG tablet    LASIX    30 tablet    Take 1 tablet (20 mg) by mouth daily    Essential hypertension with goal blood pressure less than 140/90       hydrochlorothiazide 25 MG tablet    HYDRODIURIL    15 tablet    Take 1 tablet (25 mg) by mouth daily    Essential hypertension with goal blood pressure less than 140/90       ketoconazole 2 % shampoo    NIZORAL    120 mL    Apply to the affected area and wash off after 5 minutes.    Seborrheic dermatitis       tamsulosin 0.4 MG capsule    FLOMAX    30 capsule    Take 1 capsule (0.4 mg) by mouth daily    Benign prostatic hyperplasia with nocturia

## 2018-10-15 DIAGNOSIS — Z00.00 ENCOUNTER FOR ROUTINE ADULT HEALTH EXAMINATION WITHOUT ABNORMAL FINDINGS: ICD-10-CM

## 2018-10-15 PROCEDURE — 82274 ASSAY TEST FOR BLOOD FECAL: CPT | Performed by: FAMILY MEDICINE

## 2018-10-18 LAB — HEMOCCULT STL QL IA: NEGATIVE

## 2018-11-09 ENCOUNTER — OFFICE VISIT (OUTPATIENT)
Dept: FAMILY MEDICINE | Facility: CLINIC | Age: 63
End: 2018-11-09
Payer: COMMERCIAL

## 2018-11-09 VITALS
SYSTOLIC BLOOD PRESSURE: 134 MMHG | HEART RATE: 60 BPM | TEMPERATURE: 98.5 F | WEIGHT: 212.4 LBS | RESPIRATION RATE: 16 BRPM | BODY MASS INDEX: 33.26 KG/M2 | OXYGEN SATURATION: 97 % | DIASTOLIC BLOOD PRESSURE: 74 MMHG

## 2018-11-09 DIAGNOSIS — I10 ESSENTIAL HYPERTENSION WITH GOAL BLOOD PRESSURE LESS THAN 140/90: ICD-10-CM

## 2018-11-09 RX ORDER — LISINOPRIL AND HYDROCHLOROTHIAZIDE 20; 25 MG/1; MG/1
1 TABLET ORAL DAILY
Qty: 30 TABLET | Refills: 1 | Status: SHIPPED | OUTPATIENT
Start: 2018-11-09 | End: 2018-12-14

## 2018-11-09 ASSESSMENT — ENCOUNTER SYMPTOMS
FREQUENCY: 0
ACTIVITY CHANGE: 0
COUGH: 0
RHINORRHEA: 0
UNEXPECTED WEIGHT CHANGE: 0
FATIGUE: 0
CHEST TIGHTNESS: 0
DIFFICULTY URINATING: 0
SINUS PRESSURE: 0
PALPITATIONS: 0
SHORTNESS OF BREATH: 0

## 2018-11-09 NOTE — MR AVS SNAPSHOT
After Visit Summary   2018    Wilian Ness    MRN: [id number]           Patient Information     Date Of Birth          1955        Visit Information        Provider Department      2018 4:00 PM Bran Loza MD [facility]        Today's Diagnoses     Essential hypertension with goal blood pressure less than 140/90          Care Instructions    - Stop taking Amlodipine.  - Stop taking Lasix (Furosemide).  - Start taking new lisinopril-hydrochlorothiozide (Prinzide), 1 tablet daily. Preferably in the afternoon after work.   - Continue to take your atenolol 100mg daily.  - Continue to take atorvastatin 40mg daily.  -Continue to take Tamsulosin, 1 capsule daily.   - Follow up in clinic in 1 month to recheck blood pressure, labs, and leg swelling.            Follow-ups after your visit        Who to contact     Please call your clinic at [phone] to:    Ask questions about your health    Make or cancel appointments    Discuss your medicines    Learn about your test results    Speak to your doctor            Additional Information About Your Visit        [employer] Information     [employer] is an electronic gateway that provides easy, online access to your medical records. With [employer], you can request a clinic appointment, read your test results, renew a prescription or communicate with your care team.     To sign up for [employer] visit the website at [email or web address]   You will be asked to enter the access code listed below, as well as some personal information. Please follow the directions to create your username and password.     Your access code is: [email or web address]  Expires: 2019 10:19 AM     Your access code will  in 90 days. If you need help or a new code, please contact your [facility] or call [phone] for assistance.        Care EveryWhere ID     This is your Care EveryWhere ID. This could be used by other  organizations to access your [facility] medical records  [id number]        Your Vitals Were     Pulse Temperature Respirations Pulse Oximetry BMI (Body Mass Index)       60 98.5  F (36.9  C) (Oral) 16 97% 33.26 kg/m2        Blood Pressure from Last 3 Encounters:   11/09/18 134/74   10/10/18 131/67   08/25/17 123/67    Weight from Last 3 Encounters:   11/09/18 212 lb 6.4 oz (96.3 kg)   10/10/18 206 lb 6.4 oz (93.6 kg)   08/25/17 206 lb (93.4 kg)              Today, you had the following     No orders found for display         Today's Medication Changes          These changes are accurate as of 11/9/18  4:57 PM.  If you have any questions, ask your nurse or doctor.               Start taking these medicines.        Dose/Directions    lisinopril-hydrochlorothiazide 20-25 MG per tablet   Commonly known as:  PRINZIDE/ZESTORETIC   Used for:  Essential hypertension with goal blood pressure less than 140/90   Started by:  Bran Loza MD        Dose:  1 tablet   Take 1 tablet by mouth daily   Quantity:  30 tablet   Refills:  1         Stop taking these medicines if you haven't already. Please contact your care team if you have questions.     amLODIPine 10 MG tablet   Commonly known as:  NORVASC   Stopped by:  Bran Loza MD           furosemide 20 MG tablet   Commonly known as:  LASIX   Stopped by:  Bran Loza MD           hydrochlorothiazide 25 MG tablet   Commonly known as:  HYDRODIURIL   Stopped by:  Bran Loza MD                Where to get your medicines      These medications were sent to [facility] [address] [place] [address] [address] AT [facility] & [address] [place] [address]     Phone:  [phone]     lisinopril-hydrochlorothiazide 20-25 MG per tablet                Primary Care Provider Office Phone # Fax #    Bran Loza [phone] [address] [facility] [address]        Equal Access to Services      [employer] : Hadii aad ku destini Luciano, waaxda luqadaha, qaybta kaalmada adesavi, brian joanne myrnacarlos skaggs avtarholley kinney . So [facility] [phone].    ATENCIÓN: Si habla español, tiene a werner disposición servicios gratuitos de asistencia lingüística. Llame al [phone].    We comply with applicable federal civil rights laws and [place] laws. We do not discriminate on the basis of race, color, national origin, age, disability, sex, sexual orientation, or gender identity.            Thank you!     Thank you for choosing [facility]  for your care. Our goal is always to provide you with excellent care. Hearing back from our patients is one way we can continue to improve our services. Please take a few minutes to complete the written survey that you may receive in the mail after your visit with us. Thank you!             Your Updated Medication List - Protect others around you: Learn how to safely use, store and throw away your medicines at www.disposemymeds.org.          This list is accurate as of 11/9/18  4:57 PM.  Always use your most recent med list.                   Brand Name Dispense Instructions for use Diagnosis    atenolol 100 MG tablet    TENORMIN    90 tablet    Take 1 tablet (100 mg) by mouth daily    Essential hypertension with goal blood pressure less than 140/90       atorvastatin 40 MG tablet    LIPITOR    90 tablet    Take 1 tablet (40 mg) by mouth At Bedtime    Hyperlipidemia LDL goal <130       ketoconazole 2 % shampoo    NIZORAL    120 mL    Apply to the affected area and wash off after 5 minutes.    Seborrheic dermatitis       lisinopril-hydrochlorothiazide 20-25 MG per tablet    PRINZIDE/ZESTORETIC    30 tablet    Take 1 tablet by mouth daily    Essential hypertension with goal blood pressure less than 140/90       tamsulosin 0.4 MG capsule    FLOMAX    30 capsule    Take 1 capsule (0.4 mg) by mouth daily    Benign prostatic hyperplasia with nocturia

## 2018-11-09 NOTE — PATIENT INSTRUCTIONS
- Stop taking Amlodipine.  - Stop taking Lasix (Furosemide).  - Start taking new lisinopril-hydrochlorothiozide (Prinzide), 1 tablet daily. Preferably in the afternoon after work.   - Continue to take your atenolol 100mg daily.  - Continue to take atorvastatin 40mg daily.  -Continue to take Tamsulosin, 1 capsule daily.   - Follow up in clinic in 1 month to recheck blood pressure, labs, and leg swelling.

## 2018-11-09 NOTE — PROGRESS NOTES
HPI       Wilian Ness is a 63 year old  who presents for   Chief Complaint   Patient presents with     Recheck Medication     Leg swelling, tightness, and itching has slightly improved since last month after changing hydrochlorothiazide to lasix. He is also trying to decrease his daily salt intake. No longer using extra packets of salt on his foods and stopped eating cheese curds for breakfast snack. No increase in urinary frequency and on urgency. No chest pain, dyspnea, or any additional issues.    Feels weak urinary stream and nocturia has improved since starting Flomax.      Hypertension Follow-up   <140/90    Outpatient blood pressures are not being checked.    Chest Pain? :No     Low Salt Diet: low salt    Daily NSAID Use?No     Did patient take their HTN pills today/last night as usual?  Yes    Last Basic Metabolic Panel:  Lab Results   Component Value Date    .1 10/10/2018      Lab Results   Component Value Date    POTASSIUM 3.7 10/10/2018     Lab Results   Component Value Date    CHLORIDE 101.0 10/10/2018     Lab Results   Component Value Date    BERNARDO 9.9 10/10/2018     Lab Results   Component Value Date    CO2 28.7 10/10/2018     Lab Results   Component Value Date    BUN 11.7 10/10/2018     Lab Results   Component Value Date    CR 0.9 10/10/2018     Lab Results   Component Value Date    .9 10/10/2018       Problem, Medication and Allergy Lists were reviewed and updated if needed..    Patient is an established patient of this clinic..         Review of Systems:   Review of Systems   Constitutional: Negative for activity change, fatigue and unexpected weight change.   HENT: Negative for congestion, postnasal drip, rhinorrhea, sinus pressure and sneezing.    Respiratory: Negative for cough, chest tightness and shortness of breath.    Cardiovascular: Positive for leg swelling. Negative for chest pain and palpitations.   Endocrine: Negative for polyuria.   Genitourinary: Negative for  difficulty urinating, frequency and urgency.          Physical Exam:     Vitals:    11/09/18 1609 11/09/18 1612   BP: 146/75 134/74   Pulse: 60    Resp: 16    Temp: 98.5  F (36.9  C)    TempSrc: Oral    SpO2: 97%    Weight: 212 lb 6.4 oz (96.3 kg)      Body mass index is 33.26 kg/(m^2).  Vitals were reviewed and were normal     Physical Exam   Constitutional: He is oriented to person, place, and time. He appears well-developed and well-nourished.   Eyes: Pupils are equal, round, and reactive to light.   Neck: Neck supple.   Cardiovascular: Normal rate, regular rhythm and normal heart sounds.    No murmur heard.  Pulmonary/Chest: Breath sounds normal. He has no wheezes.   Lymphadenopathy:     He has no cervical adenopathy.   Neurological: He is alert and oriented to person, place, and time.         Results:   No testing ordered today    Assessment and Plan      1. Essential hypertension with goal blood pressure less than 140/90- patient's blood pressure is well controlled and meeting goal. Continues to have ongoing leg swelling despite stopping hydrochlorothiazide and switching to lasix. May be side effect from amlodipine. Will stop amlodipine and lasix and start on Lisinopril-hydrochlorothiazide to see if this helps. Will need to see him back in 1 month to recheck blood pressure, BMP, and leg swelling. Encouraged watching salt intake as well.   - lisinopril-hydrochlorothiazide (PRINZIDE/ZESTORETIC) 20-25 MG per tablet; Take 1 tablet by mouth daily  Dispense: 30 tablet; Refill: 1  - Atenolol 100mg daily  - BMP; future    2. Leg swelling- as above. Patient still having leg swelling L>R after stopping hydrochlorothiazide and starting lasix. Make be 2/2 amlodipine. Will stop amlodipine and lasix and start lisinopril-hydrochlorothiazide. Follow up in 1 month.   - Stop lasix   - Stop amlodipine     3. Benign prostatic hyperplasia- no longer having nocturia or weak stream after starting on Flomax. Continue the same.        4. RTC 1 month- Check BP, BMP, leg swelling       - ADMIN VACCINE, INITIAL  - FLU VAC PRESRV FREE QUAD SPLIT VIR IM, 0.5 mL dosage           Options for treatment and follow-up care were reviewed with the patient. Wilian Ness  engaged in the decision making process and verbalized understanding of the options discussed and agreed with the final plan.    I was present with the medical student who participated in the service and in the documentation of this note. I have verified the history and personally performed the physical exam and medical decision making, and have verified the content of the note, which accurately reflects my assessment of the patient and the plan of care.    Gurdeep Vergara, MS3  [facility]     Bran Loza MD

## 2018-12-14 ENCOUNTER — OFFICE VISIT (OUTPATIENT)
Dept: FAMILY MEDICINE | Facility: CLINIC | Age: 63
End: 2018-12-14
Payer: COMMERCIAL

## 2018-12-14 VITALS
WEIGHT: 204 LBS | HEART RATE: 60 BPM | OXYGEN SATURATION: 97 % | DIASTOLIC BLOOD PRESSURE: 70 MMHG | BODY MASS INDEX: 31.94 KG/M2 | TEMPERATURE: 97.7 F | SYSTOLIC BLOOD PRESSURE: 132 MMHG

## 2018-12-14 DIAGNOSIS — N40.1 BENIGN PROSTATIC HYPERPLASIA WITH NOCTURIA: ICD-10-CM

## 2018-12-14 DIAGNOSIS — R35.1 BENIGN PROSTATIC HYPERPLASIA WITH NOCTURIA: ICD-10-CM

## 2018-12-14 DIAGNOSIS — I10 ESSENTIAL HYPERTENSION WITH GOAL BLOOD PRESSURE LESS THAN 140/90: Primary | ICD-10-CM

## 2018-12-14 LAB
ANION GAP SERPL CALCULATED.3IONS-SCNC: 7 MMOL/L (ref 3–14)
BUN SERPL-MCNC: 13 MG/DL (ref 7–30)
CALCIUM SERPL-MCNC: 9.3 MG/DL (ref 8.5–10.1)
CHLORIDE SERPL-SCNC: 100 MMOL/L (ref 94–109)
CO2 SERPL-SCNC: 30 MMOL/L (ref 20–32)
CREAT SERPL-MCNC: 0.9 MG/DL (ref 0.66–1.25)
GFR SERPL CREATININE-BSD FRML MDRD: 85 ML/MIN/1.7M2
GLUCOSE SERPL-MCNC: 99 MG/DL (ref 70–99)
POTASSIUM SERPL-SCNC: 4 MMOL/L (ref 3.4–5.3)
SODIUM SERPL-SCNC: 137 MMOL/L (ref 133–144)

## 2018-12-14 RX ORDER — LISINOPRIL AND HYDROCHLOROTHIAZIDE 20; 25 MG/1; MG/1
1 TABLET ORAL DAILY
Qty: 90 TABLET | Refills: 3 | Status: SHIPPED | OUTPATIENT
Start: 2018-12-14 | End: 2019-12-23

## 2018-12-14 RX ORDER — TAMSULOSIN HYDROCHLORIDE 0.4 MG/1
0.4 CAPSULE ORAL DAILY
Qty: 90 CAPSULE | Refills: 3 | Status: SHIPPED | OUTPATIENT
Start: 2018-12-14 | End: 2019-09-20

## 2018-12-14 NOTE — LETTER
December 20, 2018      Wilian Ness  [address]  [facility] [address]        Dear Wilian,    Thank you for getting your care at [facility]. Please see below for your test results.  Labs were all good. The new medication is not causing any electrolyte or kidney problems.    Resulted Orders   Basic metabolic panel   Result Value Ref Range    Sodium 137 133 - 144 mmol/L    Potassium 4.0 3.4 - 5.3 mmol/L    Chloride 100 94 - 109 mmol/L    Carbon Dioxide 30 20 - 32 mmol/L    Anion Gap 7 3 - 14 mmol/L    Glucose 99 70 - 99 mg/dL    Urea Nitrogen 13 7 - 30 mg/dL    Creatinine 0.90 0.66 - 1.25 mg/dL    GFR Estimate 85 >60 mL/min/1.7m2      Comment:      Non  GFR Calc    GFR Estimate If Black >90 >60 mL/min/1.7m2      Comment:       GFR Calc    Calcium 9.3 8.5 - 10.1 mg/dL           Sincerely,    Bran Loza MD

## 2018-12-14 NOTE — PROGRESS NOTES
HPI       Wilian Ness is a 63 year old  who presents for   Chief Complaint   Patient presents with     Recheck Medication         Concern: Medication Check   Description of the problem :    Check swelling  Improved swelling of his lower extremities off the amlodipine.    HTN  Started lisinoprilHCTZ last visit, stop amlodipine  No side effects to the new medication.  Blood pressure is improved.      Reconsider colonoscopy - had adenoma 9/2017  Together we have decided to repeat colonoscopy in September 2019.    BPH  flomax working well             +++++++           Review of Systems:   Review of Systems   Negative ROS except as noted above in HPI           Physical Exam:     Vitals:    12/14/18 1026 12/14/18 1027   BP: 151/74 132/70   Pulse: 60    Temp: 97.7  F (36.5  C)    TempSrc: Oral    SpO2: 97%    Weight: 92.5 kg (204 lb)      Body mass index is 31.94 kg/m .       Physical Exam  Gen: no distress  Lungs: clear  Cor: RRR, no S3 S4 murmur or rub  Ext: trace edema      Results:     Results for orders placed or performed in visit on 12/14/18   Basic metabolic panel   Result Value Ref Range    Sodium 137 133 - 144 mmol/L    Potassium 4.0 3.4 - 5.3 mmol/L    Chloride 100 94 - 109 mmol/L    Carbon Dioxide 30 20 - 32 mmol/L    Anion Gap 7 3 - 14 mmol/L    Glucose 99 70 - 99 mg/dL    Urea Nitrogen 13 7 - 30 mg/dL    Creatinine 0.90 0.66 - 1.25 mg/dL    GFR Estimate 85 >60 mL/min/1.7m2    GFR Estimate If Black >90 >60 mL/min/1.7m2    Calcium 9.3 8.5 - 10.1 mg/dL         Assessment and Plan        1. Essential hypertension with goal blood pressure less than 140/90  Good response  Continue  - lisinopril-hydrochlorothiazide (PRINZIDE/ZESTORETIC) 20-25 MG tablet; Take 1 tablet by mouth daily  Dispense: 90 tablet; Refill: 3  - Basic metabolic panel    2. Benign prostatic hyperplasia with nocturia  Working well  Refill x 1 year    - tamsulosin (FLOMAX) 0.4 MG capsule; Take 1 capsule (0.4 mg) by mouth daily  Dispense:  90 capsule; Refill: 3    3. CRC screening  Repeat colonoscopy in Sept 2019     There are no discontinued medications.    Options for treatment and follow-up care were reviewed with the patient. Wilian Ness  engaged in the decision making process and verbalized understanding of the options discussed and agreed with the final plan.    Bran Loza MD

## 2019-09-19 DIAGNOSIS — N40.1 BENIGN PROSTATIC HYPERPLASIA WITH NOCTURIA: ICD-10-CM

## 2019-09-19 DIAGNOSIS — R35.1 BENIGN PROSTATIC HYPERPLASIA WITH NOCTURIA: ICD-10-CM

## 2019-09-19 NOTE — TELEPHONE ENCOUNTER
"Request for medication refill:tamsulosin (FLOMAX) 0.4 MG capsule    Providers if patient needs an appointment and you are willing to give a one month supply please refill for one month and  send a letter/MyChart using \".SMILLIMITEDREFILL\" .smillimited and route chart to \"P [facility] \" (Giving one month refill in non controlled medications is strongly recommended before denial)    If refill has been denied, meaning absolutely no refills without visit, please complete the smart phrase \".smirxrefuse\" and route it to the \"P [facility] MED REFILLS\"  pool to inform the patient and the pharmacy.    Law Na, MA        "

## 2019-09-20 RX ORDER — TAMSULOSIN HYDROCHLORIDE 0.4 MG/1
0.4 CAPSULE ORAL DAILY
Qty: 90 CAPSULE | Refills: 3 | Status: SHIPPED | OUTPATIENT
Start: 2019-09-20 | End: 2020-03-10

## 2019-09-25 DIAGNOSIS — E78.5 HYPERLIPIDEMIA LDL GOAL <130: ICD-10-CM

## 2019-09-25 NOTE — TELEPHONE ENCOUNTER

## 2019-09-26 RX ORDER — ATORVASTATIN CALCIUM 40 MG/1
40 TABLET, FILM COATED ORAL AT BEDTIME
Qty: 90 TABLET | Refills: 3 | Status: SHIPPED | OUTPATIENT
Start: 2019-09-26 | End: 2020-10-07

## 2019-10-29 DIAGNOSIS — I10 ESSENTIAL HYPERTENSION WITH GOAL BLOOD PRESSURE LESS THAN 140/90: ICD-10-CM

## 2019-10-29 NOTE — TELEPHONE ENCOUNTER
"Request for medication refill: Atenolol 100mg tabs    Providers if patient needs an appointment and you are willing to give a one month supply please refill for one month and  send a letter/MyChart using \".SMILLIMITEDREFILL\" .smillimited and route chart to \"P [facility] \" (Giving one month refill in non controlled medications is strongly recommended before denial)    If refill has been denied, meaning absolutely no refills without visit, please complete the smart phrase \".smirxrefuse\" and route it to the \"P [facility] MED REFILLS\"  pool to inform the patient and the pharmacy.    Paulette Moreira CMA        "

## 2019-10-30 RX ORDER — ATENOLOL 100 MG/1
100 TABLET ORAL DAILY
Qty: 90 TABLET | Refills: 3 | Status: SHIPPED | OUTPATIENT
Start: 2019-10-30 | End: 2020-11-05

## 2019-12-23 DIAGNOSIS — I10 ESSENTIAL HYPERTENSION WITH GOAL BLOOD PRESSURE LESS THAN 140/90: ICD-10-CM

## 2019-12-23 RX ORDER — LISINOPRIL AND HYDROCHLOROTHIAZIDE 20; 25 MG/1; MG/1
1 TABLET ORAL DAILY
Qty: 30 TABLET | Refills: 1 | Status: SHIPPED | OUTPATIENT
Start: 2019-12-23 | End: 2020-01-03

## 2019-12-23 NOTE — TELEPHONE ENCOUNTER
"Request for medication refill: Lisinopril -hydrochlorothiazide  20/25    Providers if patient needs an appointment and you are willing to give a one month supply please refill for one month and  send a letter/MyChart using \".SMILLIMITEDREFILL\" .smillimited and route chart to \"P [facility] \" (Giving one month refill in non controlled medications is strongly recommended before denial)    If refill has been denied, meaning absolutely no refills without visit, please complete the smart phrase \".smirxrefuse\" and route it to the \"P [facility] MED REFILLS\"  pool to inform the patient and the pharmacy.    Althea Castellanos, [facility]        "

## 2019-12-23 NOTE — LETTER
Wilian Ness  [address] [place] [facility] [address]        December 23, 2019    Dear Wilian Ness,   I refilled your medication today for one month.  It is time for an appointment.   Please call [phone] to schedule an appointment soon.      Sincerely,    Bran Loza MD

## 2020-01-03 ENCOUNTER — HOSPITAL ENCOUNTER (OUTPATIENT)
Facility: AMBULATORY SURGERY CENTER | Age: 65
End: 2020-01-03
Attending: INTERNAL MEDICINE
Payer: COMMERCIAL

## 2020-01-03 ENCOUNTER — OFFICE VISIT (OUTPATIENT)
Dept: FAMILY MEDICINE | Facility: CLINIC | Age: 65
End: 2020-01-03
Payer: COMMERCIAL

## 2020-01-03 VITALS
RESPIRATION RATE: 16 BRPM | HEART RATE: 63 BPM | HEIGHT: 67 IN | SYSTOLIC BLOOD PRESSURE: 109 MMHG | WEIGHT: 204 LBS | DIASTOLIC BLOOD PRESSURE: 72 MMHG | TEMPERATURE: 98.1 F | BODY MASS INDEX: 32.02 KG/M2 | OXYGEN SATURATION: 97 %

## 2020-01-03 DIAGNOSIS — N40.1 BENIGN PROSTATIC HYPERPLASIA WITH NOCTURIA: ICD-10-CM

## 2020-01-03 DIAGNOSIS — I10 ESSENTIAL HYPERTENSION WITH GOAL BLOOD PRESSURE LESS THAN 140/90: ICD-10-CM

## 2020-01-03 DIAGNOSIS — Z00.00 ROUTINE HISTORY AND PHYSICAL EXAMINATION OF ADULT: Primary | ICD-10-CM

## 2020-01-03 DIAGNOSIS — R35.1 BENIGN PROSTATIC HYPERPLASIA WITH NOCTURIA: ICD-10-CM

## 2020-01-03 DIAGNOSIS — D12.6 TUBULAR ADENOMA OF COLON: ICD-10-CM

## 2020-01-03 DIAGNOSIS — E78.5 HYPERLIPIDEMIA LDL GOAL <130: ICD-10-CM

## 2020-01-03 LAB
ALBUMIN SERPL-MCNC: 4.6 MG/DL (ref 3.5–4.7)
ALP SERPL-CCNC: 66.7 U/L (ref 31.7–110.7)
ALT SERPL-CCNC: 22.1 U/L (ref 0–45)
AST SERPL-CCNC: 20 U/L (ref 0–55)
BILIRUB SERPL-MCNC: 1.7 MG/DL (ref 0.2–1.3)
BUN SERPL-MCNC: 7.3 MG/DL (ref 7–21)
CALCIUM SERPL-MCNC: 9.4 MG/DL (ref 8.5–10.1)
CHLORIDE SERPLBLD-SCNC: 95.4 MMOL/L (ref 98–110)
CHOLEST SERPL-MCNC: 178.9 MG/DL (ref 0–200)
CHOLEST/HDLC SERPL: 3.5 {RATIO} (ref 0–5)
CO2 SERPL-SCNC: 32.1 MMOL/L (ref 20–32)
CREAT SERPL-MCNC: 0.9 MG/DL (ref 0.7–1.3)
GFR SERPL CREATININE-BSD FRML MDRD: >90 ML/MIN/1.7 M2
GLUCOSE SERPL-MCNC: 130.4 MG'DL (ref 70–99)
HBA1C MFR BLD: 5.4 % (ref 4.1–5.7)
HDLC SERPL-MCNC: 50.4 MG/DL
HIV 1+2 AB+HIV1 P24 AG SERPL QL IA: NONREACTIVE
LDLC SERPL CALC-MCNC: 96 MG/DL (ref 0–129)
POTASSIUM SERPL-SCNC: 3.4 MMOL/DL (ref 3.3–4.5)
PROT SERPL-MCNC: 7.4 G/DL (ref 6.8–8.8)
PSA SERPL-ACNC: 0.63 UG/L (ref 0–4)
SODIUM SERPL-SCNC: 133.4 MMOL/L (ref 132.6–141.4)
TRIGL SERPL-MCNC: 163.5 MG/DL (ref 0–150)
VLDL CHOLESTEROL: 32.7 MG/DL (ref 7–32)

## 2020-01-03 RX ORDER — LISINOPRIL AND HYDROCHLOROTHIAZIDE 20; 25 MG/1; MG/1
1 TABLET ORAL DAILY
Qty: 90 TABLET | Refills: 3 | Status: SHIPPED | OUTPATIENT
Start: 2020-01-03 | End: 2021-02-03

## 2020-01-03 ASSESSMENT — MIFFLIN-ST. JEOR: SCORE: 1683.97

## 2020-01-03 NOTE — PROGRESS NOTES
Male Physical Note    64 year oldmale here for CPE.  Other concerns include the followin. Colonoscopy - had adenoma 2015 (repeat colonscopy recommended 2 yrs; had FIT     2.           PAST MEDICAL HISTORY  ALLERGIES  No Known Allergies    MEDS:   Current Outpatient Medications   Medication     atenolol (TENORMIN) 100 MG tablet     atorvastatin (LIPITOR) 40 MG tablet     lisinopril-hydrochlorothiazide (PRINZIDE/ZESTORETIC) 20-25 MG tablet     tamsulosin (FLOMAX) 0.4 MG capsule     ketoconazole (NIZORAL) 2 % shampoo     No current facility-administered medications for this visit.        PROBLEM LIST  Patient Active Problem List   Diagnosis     Health care home, active care coordination     Essential hypertension with goal blood pressure less than 140/90     Hyperlipidemia LDL goal <130     Tubular adenoma of colon     Neck mass     Benign prostatic hyperplasia with nocturia         FAMILY HISTORY     Family History   Problem Relation Age of Onset     Cardiovascular Father 81        Pulmonary embolus     Cancer - colorectal No family hx of      Prostate Cancer No family hx of            SOCIAL HISTORY  Social History     Socioeconomic History     Marital status: Single     Spouse name: Not on file     Number of children: Not on file     Years of education: Not on file     Highest education level: Not on file   Occupational History     Occupation: tree crew     Employer: [employer]   Social Needs     Financial resource strain: Not on file     Food insecurity:     Worry: Not on file     Inability: Not on file     Transportation needs:     Medical: Not on file     Non-medical: Not on file   Tobacco Use     Smoking status: Former Smoker     Smokeless tobacco: Never Used   Substance and Sexual Activity     Alcohol use: Yes     Comment: daily     Drug use: Not on file     Sexual activity: Not on file   Lifestyle     Physical activity:     Days per week: Not on file     Minutes per session: Not on file      "Stress: Not on file   Relationships     Social connections:     Talks on phone: Not on file     Gets together: Not on file     Attends Taoism service: Not on file     Active member of club or organization: Not on file     Attends meetings of clubs or organizations: Not on file     Relationship status: Not on file     Intimate partner violence:     Fear of current or ex partner: Not on file     Emotionally abused: Not on file     Physically abused: Not on file     Forced sexual activity: Not on file   Other Topics Concern     Parent/sibling w/ CABG, MI or angioplasty before 65F 55M? Not Asked   Social History Narrative     Not on file         Mental Health  Depression/Stress - none  Anxiety  EtOH - \"same\", CAGE - neg      Other  Dental Care: Yes   Seat Belt Use: Yes       PREVENTIVE SCREENING  Immunization History   Administered Date(s) Administered     Influenza (IIV3) PF 12/03/2007, 11/01/2008     Influenza Vaccine IM > 6 months Valent IIV4 11/09/2018     TDAP Vaccine (Boostrix) 09/01/2015       Cholesterol Level (>44 yo or at risk):  done and HIV screening:  done  Colon CA Screening (>50-75 ):  Recommended and patient accepted testing.   Patient chooses PSA screening.            ROS:                      CONSTITUTIONAL: no fatigue, no unexpected change in weight  SKIN: no worrisome rashes, no worrisome moles, no worrisome lesions  EYES: no acute vision problems or changes  ENT: no ear problems, no mouth problems, no throat problems  RESP: no significant cough, no shortness of breath  CV: no chest pain, no palpitations, no new or worsening peripheral edema  GI: no nausea, no vomiting, no constipation, no diarrhea      EXAMINATION:  /72   Pulse 63   Temp 98.1  F (36.7  C) (Oral)   Resp 16   Ht 1.702 m (5' 7\")   Wt 92.5 kg (204 lb)   SpO2 97%   BMI 31.95 kg/m       Wt Readings from Last 5 Encounters:   01/03/20 92.5 kg (204 lb)   12/14/18 92.5 kg (204 lb)   11/09/18 96.3 kg (212 lb 6.4 oz)   10/10/18 " 93.6 kg (206 lb 6.4 oz)   08/25/17 93.4 kg (206 lb)       GENERAL: healthy, alert and no distress  EYES: Eyes grossly normal to inspection, extraocular movements - intact, and PERRL  HENT: ear canals- normal; TMs- normal; Nose- normal; Mouth- no ulcers, no lesions  NECK: no tenderness, no adenopathy, no asymmetry, no masses, no stiffness; thyroid- normal to palpation  RESP: lungs clear to auscultation - no rales, no rhonchi, no wheezes  CV: regular rates and rhythm, normal S1 S2, no S3 or S4 and no murmur, no click or rub -  ABDOMEN: soft, no tenderness, no  hepatosplenomegaly, no masses, normal bowel sounds  MS: extremities- no gross deformities noted, no edema  SKIN: no suspicious lesions, no rashes  NEURO: strength and tone- normal, sensory exam- grossly normal, mentation- intact, speech- normal, reflexes- symmetric  BACK: no CVA tenderness, no paralumbar tenderness  - male: testicles- normal, no atrophy, no masses;  no inguinal hernias  PSYCH: Alert and oriented times 3; speech- coherent , normal rate and volume; able to articulate logical thoughts, able to abstract reason, no tangential thoughts, no hallucinations or delusions, affect- normal  LYMPHATICS: ant. cervical- normal, post. cervical- normal, axillary- normal, supraclavicular- normal, inguinal- normal      LABS    Results for orders placed or performed in visit on 01/03/20   Comprehensive Metabolic Panel ([facility])     Status: Abnormal   Result Value Ref Range    Calcium 9.4 8.5 - 10.1 mg/dL    Chloride 95.4 (L) 98.0 - 110.0 mmol/L    Carbon Dioxide 32.1 (H) 20.0 - 32.0 mmol/L    Creatinine 0.9 0.7 - 1.3 mg/dL    Glucose 130.4 (H) 70.0 - 99.0 mg'dL    Potassium 3.4 3.3 - 4.5 mmol/dL    Sodium 133.4 132.6 - 141.4 mmol/L    Protein Total 7.4 6.8 - 8.8 g/dL    GFR Estimate >90 >60.0 mL/min/1.7 m2    GFR Estimate If Black >90 >60.0 mL/min/1.7 m2    Albumin 4.6 3.5 - 4.7 mg/dL    Alkaline Phosphatase 66.7 31.7 - 110.7 U/L    ALT 22.1 0.0 - 45.0 U/L    AST  20.0 0.0 - 55.0 U/L    Bilirubin Total 1.7 (H) 0.2 - 1.3 mg/dL    Urea Nitrogen 7.3 7.0 - 21.0 mg/dL   Lipid Cascade ([facility])     Status: Abnormal   Result Value Ref Range    Cholesterol 178.9 0.0 - 200.0 mg/dL    Cholesterol/HDL Ratio 3.5 0.0 - 5.0    HDL Cholesterol 50.4 >40.0 mg/dL    Triglycerides 163.5 (H) 0.0 - 150.0 mg/dL    VLDL Cholesterol 32.7 (H) 7.0 - 32.0 mg/dL    LDL Cholesterol Calculated 96 0 - 129 mg/dL   Hemoglobin A1c ([facility])     Status: None   Result Value Ref Range    Hemoglobin A1C 5.4 4.1 - 5.7 %           ASSESSMENT/PLAN:    1. Routine history and physical examination of adult  2. Tubular adenoma of colon  Adenoma in 2015. Was not able to schedule since last visit. Being sent around. Will help schedule today    - Comprehensive Metabolic Panel ([facility])  - Lipid Cascade ([facility])  - Hemoglobin A1c ([facility])  - HIV Antigen Antibody Combo  - GASTROENTEROLOGY ADULT REF PROCEDURE ONLY [facility] [phone]      3. Essential hypertension with goal blood pressure less than 140/90  - lisinopril-hydrochlorothiazide (PRINZIDE/ZESTORETIC) 20-25 MG tablet; Take 1 tablet by mouth daily  Dispense: 90 tablet; Refill: 3    4. Hyperlipidemia LDL goal <130  - Comprehensive Metabolic Panel ([facility])  - Lipid [place] ([facility])    5. Benign prostatic hyperplasia with nocturia  Continue flomax  - Prostate spec antigen screen            Wesley VALLEJO MPH

## 2020-01-03 NOTE — PATIENT INSTRUCTIONS
1. Routine history and physical examination of adult  2. Tubular adenoma of colon    - GASTROENTEROLOGY ADULT REF PROCEDURE ONLY [facility] [phone]  - Comprehensive Metabolic Panel ([facility])  - Lipid Cascade ([facility])  - Hemoglobin A1c ([facility])  - HIV Antigen Antibody Combo      3. Essential hypertension with goal blood pressure less than 140/90  Good control    - lisinopril-hydrochlorothiazide (PRINZIDE/ZESTORETIC) 20-25 MG tablet; Take 1 tablet by mouth daily  Dispense: 90 tablet; Refill: 3    4. Hyperlipidemia LDL goal <130    - Comprehensive Metabolic Panel ([facility])  - Lipid [place] ([facility])    5. Benign prostatic hyperplasia with nocturia    - Prostate spec antigen screen    PHarper      Colonoscopy 2/21 @ 10:00am   [phone]  Packet will be mailed to home and a  will be needed

## 2020-01-03 NOTE — LETTER
January 5, 2020      Wilian Ness  [address]  [place] [address]        Dear Wilian,    Thank you for getting your care at [facility]. Please see below for your test results.  Your labs all look good!  A1c (diabetes) is normal  Cholesterol is good  Prostate (PSA) is good      Resulted Orders   Comprehensive Metabolic Panel ([facility])   Result Value Ref Range    Calcium 9.4 8.5 - 10.1 mg/dL    Chloride 95.4 (L) 98.0 - 110.0 mmol/L    Carbon Dioxide 32.1 (H) 20.0 - 32.0 mmol/L    Creatinine 0.9 0.7 - 1.3 mg/dL    Glucose 130.4 (H) 70.0 - 99.0 mg'dL    Potassium 3.4 3.3 - 4.5 mmol/dL    Sodium 133.4 132.6 - 141.4 mmol/L    Protein Total 7.4 6.8 - 8.8 g/dL    GFR Estimate >90 >60.0 mL/min/1.7 m2    GFR Estimate If Black >90 >60.0 mL/min/1.7 m2    Albumin 4.6 3.5 - 4.7 mg/dL    Alkaline Phosphatase 66.7 31.7 - 110.7 U/L    ALT 22.1 0.0 - 45.0 U/L    AST 20.0 0.0 - 55.0 U/L    Bilirubin Total 1.7 (H) 0.2 - 1.3 mg/dL    Urea Nitrogen 7.3 7.0 - 21.0 mg/dL   Lipid Cascade ([facility])   Result Value Ref Range    Cholesterol 178.9 0.0 - 200.0 mg/dL    Cholesterol/HDL Ratio 3.5 0.0 - 5.0    HDL Cholesterol 50.4 >40.0 mg/dL    Triglycerides 163.5 (H) 0.0 - 150.0 mg/dL    VLDL Cholesterol 32.7 (H) 7.0 - 32.0 mg/dL    LDL Cholesterol Calculated 96 0 - 129 mg/dL    Narrative    Triglycerides were >400 mg/dL, unable to calculate the LDL   Hemoglobin A1c ([facility])   Result Value Ref Range    Hemoglobin A1C 5.4 4.1 - 5.7 %   HIV Antigen Antibody Combo   Result Value Ref Range    HIV Antigen Antibody Combo Nonreactive NR^Nonreactive          Comment:      HIV-1 p24 Ag & HIV-1/HIV-2 Ab Not Detected   Prostate spec antigen screen   Result Value Ref Range    PSA 0.63 0 - 4 ug/L      Comment:      Assay Method:  Chemiluminescence using Siemens Vista analyzer           Sincerely,    Bran Loza MD

## 2020-02-14 ENCOUNTER — TELEPHONE (OUTPATIENT)
Dept: GASTROENTEROLOGY | Facility: CLINIC | Age: 65
End: 2020-02-14

## 2020-02-14 ENCOUNTER — TELEPHONE (OUTPATIENT)
Dept: GASTROENTEROLOGY | Facility: OUTPATIENT CENTER | Age: 65
End: 2020-02-14

## 2020-02-14 DIAGNOSIS — Z80.0 FAMILY HISTORY OF COLON CANCER REQUIRING SCREENING COLONOSCOPY: Primary | ICD-10-CM

## 2020-02-14 NOTE — TELEPHONE ENCOUNTER
Patient scheduled for Colonoscopy    Indication for procedure.  Screening    Referring Provider. Bran Loza MD    ? No     Arrival time verified? 9 AM    Facility location verified? [address] [place] [address]    Instructions given regarding prep and procedure Instructions reviewed    Prep Type Golytely    Are you taking any anticoagulants or blood thinners? No     Instructions given? N/a     Electronic implanted devices? Denies     Pre procedure teaching completed? Yes    Transportation from procedure?  policy reviewed. Instructed patient to have someone stay with him for 24 hours post exam    H&P / Pre op physical completed? N/a

## 2020-02-20 ENCOUNTER — ANESTHESIA EVENT (OUTPATIENT)
Dept: GASTROENTEROLOGY | Facility: CLINIC | Age: 65
End: 2020-02-20

## 2020-02-20 RX ORDER — LIDOCAINE 40 MG/G
CREAM TOPICAL
Status: CANCELLED | OUTPATIENT
Start: 2020-02-20

## 2020-02-20 RX ORDER — ONDANSETRON 2 MG/ML
4 INJECTION INTRAMUSCULAR; INTRAVENOUS
Status: CANCELLED | OUTPATIENT
Start: 2020-02-20

## 2020-02-20 RX ORDER — MEPERIDINE HYDROCHLORIDE 25 MG/ML
12.5 INJECTION INTRAMUSCULAR; INTRAVENOUS; SUBCUTANEOUS
Status: CANCELLED | OUTPATIENT
Start: 2020-02-20

## 2020-02-20 RX ORDER — ONDANSETRON 2 MG/ML
4 INJECTION INTRAMUSCULAR; INTRAVENOUS EVERY 30 MIN PRN
Status: CANCELLED | OUTPATIENT
Start: 2020-02-20

## 2020-02-20 RX ORDER — OXYCODONE HYDROCHLORIDE 5 MG/1
5-10 TABLET ORAL EVERY 4 HOURS PRN
Status: CANCELLED | OUTPATIENT
Start: 2020-02-20

## 2020-02-20 RX ORDER — DEXAMETHASONE SODIUM PHOSPHATE 4 MG/ML
4 INJECTION, SOLUTION INTRA-ARTICULAR; INTRALESIONAL; INTRAMUSCULAR; INTRAVENOUS; SOFT TISSUE EVERY 10 MIN PRN
Status: CANCELLED | OUTPATIENT
Start: 2020-02-20

## 2020-02-20 RX ORDER — FENTANYL CITRATE 50 UG/ML
25-50 INJECTION, SOLUTION INTRAMUSCULAR; INTRAVENOUS
Status: CANCELLED | OUTPATIENT
Start: 2020-02-20

## 2020-02-20 RX ORDER — KETOROLAC TROMETHAMINE 30 MG/ML
30 INJECTION, SOLUTION INTRAMUSCULAR; INTRAVENOUS EVERY 6 HOURS PRN
Status: CANCELLED | OUTPATIENT
Start: 2020-02-20 | End: 2020-02-25

## 2020-02-20 RX ORDER — HYDROMORPHONE HYDROCHLORIDE 1 MG/ML
.3-.5 INJECTION, SOLUTION INTRAMUSCULAR; INTRAVENOUS; SUBCUTANEOUS EVERY 10 MIN PRN
Status: CANCELLED | OUTPATIENT
Start: 2020-02-20

## 2020-02-20 RX ORDER — ALBUTEROL SULFATE 0.83 MG/ML
2.5 SOLUTION RESPIRATORY (INHALATION) EVERY 4 HOURS PRN
Status: CANCELLED | OUTPATIENT
Start: 2020-02-20

## 2020-02-20 RX ORDER — SODIUM CHLORIDE, SODIUM LACTATE, POTASSIUM CHLORIDE, CALCIUM CHLORIDE 600; 310; 30; 20 MG/100ML; MG/100ML; MG/100ML; MG/100ML
INJECTION, SOLUTION INTRAVENOUS CONTINUOUS
Status: CANCELLED | OUTPATIENT
Start: 2020-02-20

## 2020-02-20 RX ORDER — NALOXONE HYDROCHLORIDE 0.4 MG/ML
.1-.4 INJECTION, SOLUTION INTRAMUSCULAR; INTRAVENOUS; SUBCUTANEOUS
Status: CANCELLED | OUTPATIENT
Start: 2020-02-20 | End: 2020-02-21

## 2020-02-20 RX ORDER — ONDANSETRON 4 MG/1
4 TABLET, ORALLY DISINTEGRATING ORAL EVERY 30 MIN PRN
Status: CANCELLED | OUTPATIENT
Start: 2020-02-20

## 2020-02-21 ENCOUNTER — ANESTHESIA (OUTPATIENT)
Dept: GASTROENTEROLOGY | Facility: CLINIC | Age: 65
End: 2020-02-21

## 2020-03-10 DIAGNOSIS — R35.1 BENIGN PROSTATIC HYPERPLASIA WITH NOCTURIA: ICD-10-CM

## 2020-03-10 DIAGNOSIS — N40.1 BENIGN PROSTATIC HYPERPLASIA WITH NOCTURIA: ICD-10-CM

## 2020-03-10 RX ORDER — TAMSULOSIN HYDROCHLORIDE 0.4 MG/1
0.4 CAPSULE ORAL DAILY
Qty: 90 CAPSULE | Refills: 3 | Status: SHIPPED | OUTPATIENT
Start: 2020-03-10 | End: 2021-02-15

## 2020-03-10 NOTE — TELEPHONE ENCOUNTER
"Request for medication refill: tamsulosin (FLOMAX) 0.4 MG capsule     Providers if patient needs an appointment and you are willing to give a one month supply please refill for one month and  send a letter/MyChart using \".SMILLIMITEDREFILL\" .smillimited and route chart to \"P [facility] \" (Giving one month refill in non controlled medications is strongly recommended before denial)    If refill has been denied, meaning absolutely no refills without visit, please complete the smart phrase \".smirxrefuse\" and route it to the \"P [facility] MED REFILLS\"  pool to inform the patient and the pharmacy.    Paulina Shetty, CMA        "

## 2020-03-12 ENCOUNTER — HOSPITAL ENCOUNTER (OUTPATIENT)
Facility: AMBULATORY SURGERY CENTER | Age: 65
End: 2020-03-12
Attending: INTERNAL MEDICINE
Payer: COMMERCIAL

## 2020-03-13 ENCOUNTER — TELEPHONE (OUTPATIENT)
Dept: GASTROENTEROLOGY | Facility: CLINIC | Age: 65
End: 2020-03-13

## 2020-03-13 NOTE — TELEPHONE ENCOUNTER
atient Name: Wilian Ness   : 1955  MRN: [id number]       : N/A    Ramiro Mendoza    VM with information needed to complete pre-assessment call.  Request pt contact Endoscopy Pre-assessment RN to complete upcoming procedure information.  Telephone call-back number provided.    Vanita Strong RN  [facility]    Additional Information regarding appointment:  _____________________________________________________      Patient scheduled for:  Colonoscopy    Indication for procedure. Screening    Sedation Type: MAC    Procedure Provider:  Omar      Referring Provider. Denia    Arrival time verified: Fri / 3.20.2020 / 1230    Facility location verified:   [facility]  [address] [place] [address]  [address] [place] [address]      History & Physical: N/A

## 2020-03-15 ENCOUNTER — TELEPHONE (OUTPATIENT)
Dept: GASTROENTEROLOGY | Facility: CLINIC | Age: 65
End: 2020-03-15

## 2020-03-15 NOTE — TELEPHONE ENCOUNTER
Discussed upcoming colonoscopy with Mr Ness.     Overdo for surveillance colonoscopy but asymptomatic.     Agreed to postpone colonoscopy due to coronavirus.     He would like to reschedule in June as his wife will be done teaching then.     Message sent to scheduling pool.    David Nelson MD    [facility]  Division of Gastroenterology  [phone]

## 2020-03-16 ENCOUNTER — TELEPHONE (OUTPATIENT)
Dept: GASTROENTEROLOGY | Facility: CLINIC | Age: 65
End: 2020-03-16

## 2020-06-01 ENCOUNTER — TELEPHONE (OUTPATIENT)
Dept: GASTROENTEROLOGY | Facility: CLINIC | Age: 65
End: 2020-06-01

## 2020-06-04 DIAGNOSIS — Z11.59 ENCOUNTER FOR SCREENING FOR OTHER VIRAL DISEASES: Primary | ICD-10-CM

## 2020-06-05 ENCOUNTER — TELEPHONE (OUTPATIENT)
Dept: GASTROENTEROLOGY | Facility: OUTPATIENT CENTER | Age: 65
End: 2020-06-05

## 2020-06-09 DIAGNOSIS — Z11.59 ENCOUNTER FOR SCREENING FOR OTHER VIRAL DISEASES: ICD-10-CM

## 2020-06-09 NOTE — LETTER
Chantal 10, 2020        Wilian ODILIA Ness  [address]  [facility] [address]    This letter provides a written record that you were tested for COVID-19 on 6/9/20.   Your result was negative.    This means that we didn t find the virus that causes COVID-19 in your sample. A test may show negative when you do actually have the virus. This can happen when the virus is in the early stages of infection, before you feel illness symptoms.    Even if you don t have symptoms, they may still appear. For safety, it s very important to follow these rules.    Keep yourself away from others (self-isolation):      Stay home. Don t go to work, school or anywhere else.     Stay in your own room (and use your own bathroom), if you can.    Stay away from others in your home. No hugging, kissing or shaking hands. No visitors.    Clean  high touch  surfaces often (doorknobs, counters, handles, etc.). Use a household cleaning spray or wipes.    Cover your mouth and nose with a mask, tissue or washcloth to avoid spreading germs.    Wash your hands and face often with soap and water.    Stay in self-isolation until you meet ALL of the guidelines below:    1. You have had no fever for at least 72 hours (that is 3 full days of no fever without the use of medicine that reduces fevers), AND  2. other symptoms (such as cough, shortness of breath) have gotten better, AND  3. at least 10 days have passed since your symptoms first appeared.    Going back to work  Check with your employer for any guidelines to follow for going back to work.    Employers: This document serves as formal notice that your employee tested negative for COVID-19, as of the testing date shown above.    For questions regarding this letter or your Negative COVID-19 result, call [phone] between 8A to 6:30P (M-F) and 10A to 6:30P (weekends).

## 2020-06-10 LAB
SARS-COV-2 RNA SPEC QL NAA+PROBE: NOT DETECTED
SPECIMEN SOURCE: NORMAL

## 2020-06-11 ENCOUNTER — TELEPHONE (OUTPATIENT)
Dept: GASTROENTEROLOGY | Facility: OUTPATIENT CENTER | Age: 65
End: 2020-06-11

## 2020-06-11 NOTE — TELEPHONE ENCOUNTER
Patient taking any blood thinners ? No      Heart disease ? Denies      Lung disease ? Denies      Sleep apnea ? Denies      Diabetic ? Denies      Kidney disease ? Denies      Electronic implanted medical devices ? Denies      Are you taking any narcotic pain medication ?  N/a       PTSD ? N/a      Prep instructions reviewed with patient ?  Patient declined review.  policy,conscious sedation plan reviewed. Advised patient to have someone stay with him post exam     Wife    Pharmacy : patient has RX     Indication for procedure : Routine history and physical examination of adult     Referring provider : Bran Loza MD      Arrival Time : 9:30 AM

## 2020-06-12 ENCOUNTER — TRANSFERRED RECORDS (OUTPATIENT)
Dept: HEALTH INFORMATION MANAGEMENT | Facility: CLINIC | Age: 65
End: 2020-06-12

## 2020-10-07 DIAGNOSIS — E78.5 HYPERLIPIDEMIA LDL GOAL <130: ICD-10-CM

## 2020-10-07 RX ORDER — ATORVASTATIN CALCIUM 40 MG/1
40 TABLET, FILM COATED ORAL AT BEDTIME
Qty: 90 TABLET | Refills: 0 | Status: SHIPPED | OUTPATIENT
Start: 2020-10-07 | End: 2021-01-06

## 2020-10-07 NOTE — TELEPHONE ENCOUNTER
Verify that the refill encounter hasn't been started Yes    [facility] phone call message- patient requesting a refill:    Full Medication Name: atorvastatin (LIPITOR) 40 MG tablet    Dose: Take 1 tablet (40 mg) by mouth At Bedtime - Oral     Pharmacy confirmed as   [facility] #[id number] - [place], [place] - [address] AT [facility] & [address] [place] [id number]  Phone: [phone] Fax: [phone]  : Yes    Medication tab checked to see if medication has been sent  Yes    Additional Comments: Patient is out of medication, please advise.      OK to leave a message on voice mail? Yes    Advised patient refill may take up to 2 business days? Yes    Primary language: English      needed? No    Call taken on October 7, 2020 at 12:19 PM by Halle Feliz    Route to P [facility] MED REFILL

## 2020-11-05 ENCOUNTER — TELEPHONE (OUTPATIENT)
Dept: FAMILY MEDICINE | Facility: CLINIC | Age: 65
End: 2020-11-05

## 2020-11-05 DIAGNOSIS — I10 ESSENTIAL HYPERTENSION WITH GOAL BLOOD PRESSURE LESS THAN 140/90: ICD-10-CM

## 2020-11-05 RX ORDER — ATENOLOL 100 MG/1
100 TABLET ORAL DAILY
Qty: 90 TABLET | Refills: 0 | Status: SHIPPED | OUTPATIENT
Start: 2020-11-05 | End: 2021-02-15

## 2020-11-05 NOTE — TELEPHONE ENCOUNTER
Verify that the refill encounter hasn't been started Yes    [facility] phone call message- patient requesting a refill:    Full Medication Name: atenolol (TENORMIN) 100 MG tablet    Dose: Take 1 tablet (100 mg) by mouth daily - Oral     Pharmacy confirmed as   [facility][id number] - [facility] [address] AT [address] [address]  Phone: [phone] Fax: [phone]  : Yes    Medication tab checked to see if medication has been sent  Yes    Additional Comments: Patient is out of medication.     OK to leave a message on voice mail? Yes    Advised patient refill may take up to 2 business days? Yes    Primary language: English      needed? No    Call taken on November 5, 2020 at 4:05 PM by Sia Bennett    Route to P [facility] MED REFILL

## 2021-01-05 DIAGNOSIS — E78.5 HYPERLIPIDEMIA LDL GOAL <130: ICD-10-CM

## 2021-01-05 NOTE — TELEPHONE ENCOUNTER
Verify that the refill encounter hasn't been started Yes    [facility] phone call message- patient requesting a refill:    Full Medication Name: atorvastatin (LIPITOR) 40 MG tablet    Dose: Take 1 tablet (40 mg) by mouth At Bedtime - Oral     Pharmacy confirmed as   [facility] #[id number] - [place], [place] - [address] AT [facility] & [address] [place] [address]  Phone: [phone] Fax: [phone]  : Yes    Medication tab checked to see if medication has been sent  Yes    Additional Comments: Patient has 3 tablets left.      OK to leave a message on voice mail? Yes    Advised patient refill may take up to 2 business days? Yes    Primary language: English      needed? No    Call taken on January 5, 2021 at 1:34 PM by Halle Feliz    Route to P [facility] MED REFILL

## 2021-01-06 RX ORDER — ATORVASTATIN CALCIUM 40 MG/1
40 TABLET, FILM COATED ORAL AT BEDTIME
Qty: 90 TABLET | Refills: 0 | Status: SHIPPED | OUTPATIENT
Start: 2021-01-06 | End: 2021-02-15

## 2021-02-03 DIAGNOSIS — I10 ESSENTIAL HYPERTENSION WITH GOAL BLOOD PRESSURE LESS THAN 140/90: ICD-10-CM

## 2021-02-03 NOTE — TELEPHONE ENCOUNTER
"Patient calling requesting refill of lisinopril-hydrochlorothiazide. Last office visit 1/3/20 with Dr. Loza. RN unable to fill as patient has not been seen in over a year. Routing to PCP high priority to order if appropriate.   Barbra Kapadia RN      Request for medication refill:    Providers if patient needs an appointment and you are willing to give a one month supply please refill for one month and  send a letter/MyChart using \".SMILLIMITEDREFILL\" .smillimited and route chart to \"P [facility] \" (Giving one month refill in non controlled medications is strongly recommended before denial)    If refill has been denied, meaning absolutely no refills without visit, please complete the smart phrase \".smirxrefuse\" and route it to the \"P [facility] MED REFILLS\"  pool to inform the patient and the pharmacy.    Barbra Kapadia RN         "

## 2021-02-03 NOTE — TELEPHONE ENCOUNTER
Verify that the refill encounter hasn't been started Yes    [facility] phone call message- patient requesting a refill:    Full Medication Name: lisinopril-hydrochlorothiazide     Dose:      Pharmacy confirmed as   [facility] #[id number] - [address] AT [facility] & [address] [place] [id number]  Phone: [phone] Fax: [phone]  : Yes    Medication tab checked to see if medication has been sent  Yes    Additional Comments: patient oint of meds     OK to leave a message on voice mail? Yes    Advised patient refill may take up to 2 business days?no    Primary language: English      needed? No    Call taken on February 3, 2021 at 4:21 PM by Louie Marlow    Route to P [facility] MED REFILL

## 2021-02-03 NOTE — LETTER
Wilian Ness  [address] [facility] [address]    February 4, 2021        Dear Wilian Ness,   I refilled your medication today for one month.  It is time for an appointment.   Please call [phone] to schedule an appointment soon.      Sincerely    PHarper

## 2021-02-04 RX ORDER — LISINOPRIL AND HYDROCHLOROTHIAZIDE 20; 25 MG/1; MG/1
1 TABLET ORAL DAILY
Qty: 30 TABLET | Refills: 0 | Status: SHIPPED | OUTPATIENT
Start: 2021-02-04 | End: 2021-02-15

## 2021-02-15 ENCOUNTER — OFFICE VISIT (OUTPATIENT)
Dept: FAMILY MEDICINE | Facility: CLINIC | Age: 66
End: 2021-02-15
Payer: COMMERCIAL

## 2021-02-15 VITALS
HEART RATE: 58 BPM | DIASTOLIC BLOOD PRESSURE: 67 MMHG | RESPIRATION RATE: 16 BRPM | OXYGEN SATURATION: 95 % | BODY MASS INDEX: 33.17 KG/M2 | WEIGHT: 211.8 LBS | SYSTOLIC BLOOD PRESSURE: 122 MMHG

## 2021-02-15 DIAGNOSIS — E78.5 HYPERLIPIDEMIA LDL GOAL <130: ICD-10-CM

## 2021-02-15 DIAGNOSIS — I10 ESSENTIAL HYPERTENSION WITH GOAL BLOOD PRESSURE LESS THAN 140/90: ICD-10-CM

## 2021-02-15 DIAGNOSIS — N40.1 BENIGN PROSTATIC HYPERPLASIA WITH NOCTURIA: ICD-10-CM

## 2021-02-15 DIAGNOSIS — Z00.00 ROUTINE HISTORY AND PHYSICAL EXAMINATION OF ADULT: Primary | ICD-10-CM

## 2021-02-15 DIAGNOSIS — R35.1 BENIGN PROSTATIC HYPERPLASIA WITH NOCTURIA: ICD-10-CM

## 2021-02-15 LAB
ALBUMIN SERPL-MCNC: 4.4 MG/DL (ref 3.5–4.7)
ALP SERPL-CCNC: 72.9 U/L (ref 31.7–110.7)
ALT SERPL-CCNC: 21.7 U/L (ref 0–45)
AST SERPL-CCNC: 18.5 U/L (ref 0–55)
BILIRUB SERPL-MCNC: 1.9 MG/DL (ref 0.2–1.3)
BUN SERPL-MCNC: 6.1 MG/DL (ref 7–21)
CALCIUM SERPL-MCNC: 9.6 MG/DL (ref 8.5–10.1)
CHLORIDE SERPLBLD-SCNC: 94.3 MMOL/L (ref 98–110)
CHOLEST SERPL-MCNC: 205.9 MG/DL (ref 0–200)
CHOLEST/HDLC SERPL: 3.6 {RATIO} (ref 0–5)
CO2 SERPL-SCNC: 32 MMOL/L (ref 20–32)
CREAT SERPL-MCNC: 0.7 MG/DL (ref 0.7–1.3)
GFR SERPL CREATININE-BSD FRML MDRD: >90 ML/MIN/1.7 M2
GLUCOSE SERPL-MCNC: 100.2 MG'DL (ref 70–99)
HBA1C MFR BLD: 5.2 % (ref 4.1–5.7)
HDLC SERPL-MCNC: 56.7 MG/DL
LDLC SERPL CALC-MCNC: 123 MG/DL (ref 0–129)
POTASSIUM SERPL-SCNC: 4.1 MMOL/L (ref 3.3–4.5)
PROT SERPL-MCNC: 7 G/DL (ref 6.8–8.8)
PSA SERPL-ACNC: 0.57 UG/L (ref 0–4)
SODIUM SERPL-SCNC: 133.1 MMOL/L (ref 132.6–141.4)
TRIGL SERPL-MCNC: 130.1 MG/DL (ref 0–150)
VLDL CHOLESTEROL: 26 MG/DL (ref 7–32)

## 2021-02-15 PROCEDURE — 99396 PREV VISIT EST AGE 40-64: CPT | Performed by: FAMILY MEDICINE

## 2021-02-15 PROCEDURE — 80061 LIPID PANEL: CPT | Performed by: FAMILY MEDICINE

## 2021-02-15 PROCEDURE — 80053 COMPREHEN METABOLIC PANEL: CPT | Performed by: FAMILY MEDICINE

## 2021-02-15 PROCEDURE — 36415 COLL VENOUS BLD VENIPUNCTURE: CPT | Performed by: FAMILY MEDICINE

## 2021-02-15 PROCEDURE — 83036 HEMOGLOBIN GLYCOSYLATED A1C: CPT | Performed by: FAMILY MEDICINE

## 2021-02-15 PROCEDURE — G0103 PSA SCREENING: HCPCS | Performed by: FAMILY MEDICINE

## 2021-02-15 RX ORDER — ATORVASTATIN CALCIUM 40 MG/1
40 TABLET, FILM COATED ORAL AT BEDTIME
Qty: 90 TABLET | Refills: 3 | Status: SHIPPED | OUTPATIENT
Start: 2021-02-15 | End: 2022-04-08

## 2021-02-15 RX ORDER — TAMSULOSIN HYDROCHLORIDE 0.4 MG/1
0.4 CAPSULE ORAL DAILY
Qty: 90 CAPSULE | Refills: 3 | Status: SHIPPED | OUTPATIENT
Start: 2021-02-15 | End: 2022-04-20

## 2021-02-15 RX ORDER — LISINOPRIL AND HYDROCHLOROTHIAZIDE 20; 25 MG/1; MG/1
1 TABLET ORAL DAILY
Qty: 90 TABLET | Refills: 3 | Status: SHIPPED | OUTPATIENT
Start: 2021-02-15 | End: 2021-02-21

## 2021-02-15 RX ORDER — ATENOLOL 100 MG/1
100 TABLET ORAL DAILY
Qty: 90 TABLET | Refills: 3 | Status: SHIPPED | OUTPATIENT
Start: 2021-02-15 | End: 2022-02-10

## 2021-02-15 NOTE — LETTER
February 18, 2021      Wilian Ness  [address]  [facility] [address]        Dear Wilian,    Thank you for getting your care at [facility]. Please see below for your test results.  All your labs are stable compared to last year.  Continue with the present doses of medications.    Resulted Orders   Lipid Cascade ([facility])   Result Value Ref Range    Cholesterol 205.9 (H) 0.0 - 200.0 mg/dL    Cholesterol/HDL Ratio 3.6 0.0 - 5.0    HDL Cholesterol 56.7 >40.0 mg/dL    Triglycerides 130.1 0.0 - 150.0 mg/dL    VLDL Cholesterol 26.0 7.0 - 32.0 mg/dL    LDL Cholesterol Calculated 123 0 - 129 mg/dL   Comprehensive Metabolic Panel ([facility])   Result Value Ref Range    Calcium 9.6 8.5 - 10.1 mg/dL    Chloride 94.3 (L) 98.0 - 110.0 mmol/L    Carbon Dioxide 32.0 (H) 20.0 - 32.0 mmol/L    Creatinine 0.7 0.7 - 1.3 mg/dL    Glucose 100.2 (H) 70.0 - 99.0 mg'dL    Potassium 4.1 3.3 - 4.5 mmol/L    Sodium 133.1 132.6 - 141.4 mmol/L    Protein Total 7.0 6.8 - 8.8 g/dL    GFR Estimate >90 >60.0 mL/min/1.7 m2    GFR Estimate If Black >90 >60.0 mL/min/1.7 m2    Albumin 4.4 3.5 - 4.7 mg/dL    Alkaline Phosphatase 72.9 31.7 - 110.7 U/L    ALT 21.7 0.0 - 45.0 U/L    AST 18.5 0.0 - 55.0 U/L    Bilirubin Total 1.9 (H) 0.2 - 1.3 mg/dL    Urea Nitrogen 6.1 (L) 7.0 - 21.0 mg/dL   Hemoglobin A1c ([facility])   Result Value Ref Range    Hemoglobin A1C 5.2 4.1 - 5.7 %   Prostate spec antigen screen   Result Value Ref Range    PSA 0.57 0 - 4 ug/L      Comment:      Assay Method:  Chemiluminescence using Siemens Vista analyzer           Sincerely,    Bran Loza MD

## 2021-02-15 NOTE — PROGRESS NOTES
Assessment & Plan     Routine history and physical examination of adult    - Lipid [place] ([facility])  - Comprehensive Metabolic Panel ([facility])  - Hemoglobin A1c ([facility])  - Prostate spec antigen screen    Essential hypertension with goal blood pressure less than 140/90  - atenolol (TENORMIN) 100 MG tablet; Take 1 tablet (100 mg) by mouth daily  - lisinopril-hydrochlorothiazide (ZESTORETIC) 20-25 MG tablet; Take 1 tablet by mouth daily -     Hyperlipidemia LDL goal <130  - atorvastatin (LIPITOR) 40 MG tablet; Take 1 tablet (40 mg) by mouth At Bedtime    Benign prostatic hyperplasia with nocturia  - tamsulosin (FLOMAX) 0.4 MG capsule; Take 1 capsule (0.4 mg) by mouth daily                   Bran Loza MD  [facility] GRACE Cedeno is a 65 year old who presents for the following health issues     HPI   1. HTN  Stable    2. Lipids  Stable    3 Etoh  Same  No functional issues          Review of Systems         Objective    /67   Pulse 58   Resp 16   Wt 96.1 kg (211 lb 12.8 oz)   SpO2 95%   BMI 33.17 kg/m    Body mass index is 33.17 kg/m .     Wt Readings from Last 5 Encounters:   02/15/21 96.1 kg (211 lb 12.8 oz)   01/03/20 92.5 kg (204 lb)   12/14/18 92.5 kg (204 lb)   11/09/18 96.3 kg (212 lb 6.4 oz)   10/10/18 93.6 kg (206 lb 6.4 oz)       Physical Exam   GENERAL: healthy, alert and no distress  EYES: Eyes grossly normal to inspection, PERRL and conjunctivae and sclerae normal  HENT: ear canals and TM's normal, nose and mouth without ulcers or lesions  NECK: no adenopathy, no asymmetry, masses, or scars and thyroid normal to palpation  RESP: lungs clear to auscultation - no rales, rhonchi or wheezes  CV: regular rate and rhythm, normal S1 S2, no S3 or S4, no murmur, click or rub, no peripheral edema and peripheral pulses strong  ABDOMEN: soft, nontender, no hepatosplenomegaly, no masses and bowel sounds normal  MS: no gross musculoskeletal defects noted, no  edema        Results for orders placed or performed in visit on 02/15/21   Lipid Cascade ([facility])     Status: Abnormal   Result Value Ref Range    Cholesterol 205.9 (H) 0.0 - 200.0 mg/dL    Cholesterol/HDL Ratio 3.6 0.0 - 5.0    HDL Cholesterol 56.7 >40.0 mg/dL    Triglycerides 130.1 0.0 - 150.0 mg/dL    VLDL Cholesterol 26.0 7.0 - 32.0 mg/dL    LDL Cholesterol Calculated 123 0 - 129 mg/dL   Comprehensive Metabolic Panel ([facility])     Status: Abnormal   Result Value Ref Range    Calcium 9.6 8.5 - 10.1 mg/dL    Chloride 94.3 (L) 98.0 - 110.0 mmol/L    Carbon Dioxide 32.0 (H) 20.0 - 32.0 mmol/L    Creatinine 0.7 0.7 - 1.3 mg/dL    Glucose 100.2 (H) 70.0 - 99.0 mg'dL    Potassium 4.1 3.3 - 4.5 mmol/L    Sodium 133.1 132.6 - 141.4 mmol/L    Protein Total 7.0 6.8 - 8.8 g/dL    GFR Estimate >90 >60.0 mL/min/1.7 m2    GFR Estimate If Black >90 >60.0 mL/min/1.7 m2    Albumin 4.4 3.5 - 4.7 mg/dL    Alkaline Phosphatase 72.9 31.7 - 110.7 U/L    ALT 21.7 0.0 - 45.0 U/L    AST 18.5 0.0 - 55.0 U/L    Bilirubin Total 1.9 (H) 0.2 - 1.3 mg/dL    Urea Nitrogen 6.1 (L) 7.0 - 21.0 mg/dL   Hemoglobin A1c ([facility])     Status: None   Result Value Ref Range    Hemoglobin A1C 5.2 4.1 - 5.7 %   Prostate spec antigen screen     Status: None   Result Value Ref Range    PSA 0.57 0 - 4 ug/L

## 2021-02-21 RX ORDER — LISINOPRIL AND HYDROCHLOROTHIAZIDE 20; 25 MG/1; MG/1
1 TABLET ORAL DAILY
Qty: 90 TABLET | Refills: 3 | COMMUNITY
Start: 2021-02-21 | End: 2022-03-21

## 2022-02-09 DIAGNOSIS — I10 ESSENTIAL HYPERTENSION WITH GOAL BLOOD PRESSURE LESS THAN 140/90: ICD-10-CM

## 2022-02-09 NOTE — TELEPHONE ENCOUNTER

## 2022-02-10 RX ORDER — ATENOLOL 100 MG/1
100 TABLET ORAL DAILY
Qty: 90 TABLET | Refills: 3 | Status: SHIPPED | OUTPATIENT
Start: 2022-02-10 | End: 2022-04-04

## 2022-03-21 DIAGNOSIS — I10 ESSENTIAL HYPERTENSION WITH GOAL BLOOD PRESSURE LESS THAN 140/90: ICD-10-CM

## 2022-03-21 RX ORDER — LISINOPRIL AND HYDROCHLOROTHIAZIDE 20; 25 MG/1; MG/1
1 TABLET ORAL DAILY
Qty: 30 TABLET | Refills: 1 | Status: SHIPPED | OUTPATIENT
Start: 2022-03-21 | End: 2022-04-20

## 2022-03-21 NOTE — LETTER
Wilian Ness  [address] [facility] [address]    March 21, 2022      Dear Wilian Ness,   I refilled your medication today for one month.  It is time for an appointment.   Please call [phone] to schedule an appointment soon.      Sincerely    PHarper

## 2022-03-21 NOTE — TELEPHONE ENCOUNTER
"Request for medication refill:  lisinopril-hydrochlorothiazide (ZESTORETIC) 20-25 MG tablet    Providers if patient needs an appointment and you are willing to give a one month supply please refill for one month and  send a letter/MyChart using \".SMILLIMITEDREFILL\" .smillimited and route chart to \"P [facility] \" (Giving one month refill in non controlled medications is strongly recommended before denial)    If refill has been denied, meaning absolutely no refills without visit, please complete the smart phrase \".smirxrefuse\" and route it to the \"P [facility] MED REFILLS\"  pool to inform the patient and the pharmacy.    Kristel Howard MA        "

## 2022-03-22 NOTE — TELEPHONE ENCOUNTER
Patient notified prescription sent to pharmacy on file.Patient has an appointment with PCP on 04/20/2022 to follow up on blood pressure/medications,Per Doctor Loza.    Christie Ramos MA

## 2022-04-04 DIAGNOSIS — I10 ESSENTIAL HYPERTENSION WITH GOAL BLOOD PRESSURE LESS THAN 140/90: ICD-10-CM

## 2022-04-04 NOTE — TELEPHONE ENCOUNTER
[facility] phone call message- patient requesting a refill:    Full Medication Name:   atorvastatin (LIPITOR) 40 MG tablet 90 tablet         Pharmacy confirmed as   [facility] #[id number] - [address] AT [facility] & [address] [place] [address]  Phone: [phone] Fax: [phone]  : Yes    Additional Comments: Patient called and said he is completely out of medication and needs a refill until the next office visit with Dr. Loza which is  4/20/2022. Patient asked if he can receive 2-3 weeks worth of medications.    OK to leave a message on voice mail? Yes    Primary language: English      needed? No    Call taken on April 4, 2022 at 3:50 PM by Capri Marlow

## 2022-04-04 NOTE — TELEPHONE ENCOUNTER
Patient requesting refill of atorvastatin, out of medication. Last OV 2/15/21, has follow up scheduled for 4/20/22. RN unable to refill as patient has not been seen in the past year, routing to preceptor to provide limited refill if appropriate.     Surendra Dumas RN

## 2022-04-05 RX ORDER — ATENOLOL 100 MG/1
100 TABLET ORAL DAILY
Qty: 30 TABLET | Refills: 0 | Status: SHIPPED | OUTPATIENT
Start: 2022-04-05 | End: 2022-04-20

## 2022-04-08 DIAGNOSIS — E78.5 HYPERLIPIDEMIA LDL GOAL <130: ICD-10-CM

## 2022-04-08 RX ORDER — ATORVASTATIN CALCIUM 40 MG/1
40 TABLET, FILM COATED ORAL AT BEDTIME
Qty: 90 TABLET | Refills: 0 | Status: SHIPPED | OUTPATIENT
Start: 2022-04-08 | End: 2022-04-20

## 2022-04-08 NOTE — TELEPHONE ENCOUNTER
"Request for medication refill:  atorvastatin (LIPITOR) 40 MG tablet  Providers if patient needs an appointment and you are willing to give a one month supply please refill for one month and  send a letter/MyChart using \".SMILLIMITEDREFILL\" .smillimited and route chart to \"P [facility] \" (Giving one month refill in non controlled medications is strongly recommended before denial)    If refill has been denied, meaning absolutely no refills without visit, please complete the smart phrase \".smirxrefuse\" and route it to the \"P [facility] MED REFILLS\"  pool to inform the patient and the pharmacy.    Maryann Knutson        "

## 2022-04-20 ENCOUNTER — OFFICE VISIT (OUTPATIENT)
Dept: FAMILY MEDICINE | Facility: CLINIC | Age: 67
End: 2022-04-20
Payer: COMMERCIAL

## 2022-04-20 VITALS
RESPIRATION RATE: 16 BRPM | DIASTOLIC BLOOD PRESSURE: 70 MMHG | SYSTOLIC BLOOD PRESSURE: 137 MMHG | HEART RATE: 57 BPM | BODY MASS INDEX: 31.89 KG/M2 | OXYGEN SATURATION: 97 % | HEIGHT: 67 IN | WEIGHT: 203.2 LBS

## 2022-04-20 DIAGNOSIS — E78.5 HYPERLIPIDEMIA LDL GOAL <130: ICD-10-CM

## 2022-04-20 DIAGNOSIS — I10 ESSENTIAL HYPERTENSION WITH GOAL BLOOD PRESSURE LESS THAN 140/90: Primary | ICD-10-CM

## 2022-04-20 DIAGNOSIS — Z87.891 FORMER SMOKER: ICD-10-CM

## 2022-04-20 DIAGNOSIS — Z87.891 PERSONAL HISTORY OF TOBACCO USE: ICD-10-CM

## 2022-04-20 DIAGNOSIS — Z23 ENCOUNTER FOR IMMUNIZATION: ICD-10-CM

## 2022-04-20 LAB
ALBUMIN SERPL-MCNC: 3.7 G/DL (ref 3.4–5)
ALP SERPL-CCNC: 81 U/L (ref 40–150)
ALT SERPL W P-5'-P-CCNC: 33 U/L (ref 0–70)
ANION GAP SERPL CALCULATED.3IONS-SCNC: 6 MMOL/L (ref 3–14)
AST SERPL W P-5'-P-CCNC: 22 U/L (ref 0–45)
BILIRUB SERPL-MCNC: 1.1 MG/DL (ref 0.2–1.3)
BUN SERPL-MCNC: 19 MG/DL (ref 7–30)
CALCIUM SERPL-MCNC: 9.6 MG/DL (ref 8.5–10.1)
CHLORIDE BLD-SCNC: 102 MMOL/L (ref 94–109)
CHOLEST SERPL-MCNC: 186 MG/DL
CO2 SERPL-SCNC: 30 MMOL/L (ref 20–32)
CREAT SERPL-MCNC: 0.98 MG/DL (ref 0.66–1.25)
FASTING STATUS PATIENT QL REPORTED: ABNORMAL
GFR SERPL CREATININE-BSD FRML MDRD: 86 ML/MIN/1.73M2
GLUCOSE BLD-MCNC: 96 MG/DL (ref 70–99)
HBA1C MFR BLD: 5.4 % (ref 0–5.6)
HDLC SERPL-MCNC: 50 MG/DL
LDLC SERPL CALC-MCNC: 85 MG/DL
NONHDLC SERPL-MCNC: 136 MG/DL
POTASSIUM BLD-SCNC: 3.8 MMOL/L (ref 3.4–5.3)
PROT SERPL-MCNC: 7.3 G/DL (ref 6.8–8.8)
SODIUM SERPL-SCNC: 138 MMOL/L (ref 133–144)
TRIGL SERPL-MCNC: 254 MG/DL

## 2022-04-20 PROCEDURE — 99214 OFFICE O/P EST MOD 30 MIN: CPT | Mod: 25 | Performed by: FAMILY MEDICINE

## 2022-04-20 PROCEDURE — 90662 IIV NO PRSV INCREASED AG IM: CPT | Performed by: FAMILY MEDICINE

## 2022-04-20 PROCEDURE — 36415 COLL VENOUS BLD VENIPUNCTURE: CPT | Performed by: FAMILY MEDICINE

## 2022-04-20 PROCEDURE — 80061 LIPID PANEL: CPT | Performed by: FAMILY MEDICINE

## 2022-04-20 PROCEDURE — 80053 COMPREHEN METABOLIC PANEL: CPT | Performed by: FAMILY MEDICINE

## 2022-04-20 PROCEDURE — 83036 HEMOGLOBIN GLYCOSYLATED A1C: CPT | Performed by: FAMILY MEDICINE

## 2022-04-20 PROCEDURE — 90471 IMMUNIZATION ADMIN: CPT | Performed by: FAMILY MEDICINE

## 2022-04-20 RX ORDER — ATENOLOL 100 MG/1
100 TABLET ORAL DAILY
Qty: 90 TABLET | Refills: 3 | Status: SHIPPED | OUTPATIENT
Start: 2022-04-20 | End: 2023-04-18

## 2022-04-20 RX ORDER — LISINOPRIL AND HYDROCHLOROTHIAZIDE 20; 25 MG/1; MG/1
1 TABLET ORAL DAILY
Qty: 90 TABLET | Refills: 3 | Status: SHIPPED | OUTPATIENT
Start: 2022-04-20 | End: 2023-04-03

## 2022-04-20 RX ORDER — ATORVASTATIN CALCIUM 40 MG/1
40 TABLET, FILM COATED ORAL AT BEDTIME
Qty: 90 TABLET | Refills: 3 | Status: SHIPPED | OUTPATIENT
Start: 2022-04-20 | End: 2022-07-07

## 2022-04-20 NOTE — PROGRESS NOTES
"  Assessment & Plan     Essential hypertension with goal blood pressure less than 140/90  At goal  - Comprehensive metabolic panel; Future  - Hemoglobin A1c; Future  - Comprehensive metabolic panel  - atenolol (TENORMIN) 100 MG tablet; Take 1 tablet (100 mg) by mouth daily  - lisinopril-hydrochlorothiazide (ZESTORETIC) 20-25 MG tablet; Take 1 tablet by mouth daily    Hyperlipidemia LDL goal <130  At goal  - Lipid panel reflex to direct LDL Non-fasting; Future  - Lipid panel reflex to direct LDL Non-fasting  - atorvastatin (LIPITOR) 40 MG tablet; Take 1 tablet (40 mg) by mouth At Bedtime    Encounter for immunization  - INFLUENZA (HIGH DOSE) 3 VALENT VACCINE [23287]    Former smoker  Personal history of tobacco use  Discussed CT screening  Discussed AAA  briefly    Reviewed risk  - 3%  Referral made. He is hesitant due to insurance    Offer AAA  next visit  - CT Chest Lung Cancer Scrn Low Dose wo; Future  - Prof Fee: Shared Decision Making Visit for Lung Cancer Screening    RTC 1 year  Offer [facility]               Bran Loza MD  [facility] GRACE Cedeno is a 67 year old who presents for the following health issues     HPI   1. HTN  Follow-up bp. Not checking at home. No concerns     2. Lipds  Needs refill      3, former smoker  Address previous smoker - lung ca screen, AAA   Quit Dec [id number]  2ppd at end                Review of Systems         Objective    /70   Pulse 57   Resp 16   Ht 1.69 m (5' 6.54\")   Wt 92.2 kg (203 lb 3.2 oz)   SpO2 97%   BMI 32.27 kg/m    Body mass index is 32.27 kg/m .     Physical Exam   GENERAL: healthy, alert and no distress  RESP: lungs clear to auscultation - no rales, rhonchi or wheezes  CV: regular rate and rhythm, normal S1 S2, no S3 or S4, no murmur, click or rub, no peripheral edema and peripheral pulses strong  ABDOMEN: soft, nontender, no hepatosplenomegaly, no masses and bowel sounds normal  MS: no gross " musculoskeletal defects noted, no edema  NEURO: Normal strength and tone, mentation intact and speech normal  PSYCH: mentation appears normal, affect normal/bright    Results for orders placed or performed in visit on 04/20/22   Comprehensive metabolic panel     Status: Normal   Result Value Ref Range    Sodium 138 133 - 144 mmol/L    Potassium 3.8 3.4 - 5.3 mmol/L    Chloride 102 94 - 109 mmol/L    Carbon Dioxide (CO2) 30 20 - 32 mmol/L    Anion Gap 6 3 - 14 mmol/L    Urea Nitrogen 19 7 - 30 mg/dL    Creatinine 0.98 0.66 - 1.25 mg/dL    Calcium 9.6 8.5 - 10.1 mg/dL    Glucose 96 70 - 99 mg/dL    Alkaline Phosphatase 81 40 - 150 U/L    AST 22 0 - 45 U/L    ALT 33 0 - 70 U/L    Protein Total 7.3 6.8 - 8.8 g/dL    Albumin 3.7 3.4 - 5.0 g/dL    Bilirubin Total 1.1 0.2 - 1.3 mg/dL    GFR Estimate 86 >60 mL/min/1.73m2   Lipid panel reflex to direct LDL Non-fasting     Status: Abnormal   Result Value Ref Range    Cholesterol 186 <200 mg/dL    Triglycerides 254 (H) <150 mg/dL    Direct Measure HDL 50 >=40 mg/dL    LDL Cholesterol Calculated 85 <=100 mg/dL    Non HDL Cholesterol 136 (H) <130 mg/dL    Patient Fasting > 8hrs? Unknown     Narrative    Cholesterol  Desirable:  <200 mg/dL    Triglycerides  Normal:  Less than 150 mg/dL  Borderline High:  150-199 mg/dL  High:  200-499 mg/dL  Very High:  Greater than or equal to 500 mg/dL    Direct Measure HDL  Female:  Greater than or equal to 50 mg/dL   Male:  Greater than or equal to 40 mg/dL    LDL Cholesterol  Desirable:  <100mg/dL  Above Desirable:  100-129 mg/dL   Borderline High:  130-159 mg/dL   High:  160-189 mg/dL   Very High:  >= 190 mg/dL    Non HDL Cholesterol  Desirable:  130 mg/dL  Above Desirable:  130-159 mg/dL  Borderline High:  160-189 mg/dL  High:  190-219 mg/dL  Very High:  Greater than or equal to 220 mg/dL   Hemoglobin A1c     Status: Normal   Result Value Ref Range    Hemoglobin A1C 5.4 0.0 - 5.6 %             Lung Cancer Screening Shared Decision Making  Visit     Wilian Ness is eligible for lung cancer screening on the basis of the information provided in my signed lung cancer screening order.     I have discussed with patient the risks and benefits of screening for lung cancer with low-dose CT.     The risks include:  radiation exposure: one low dose chest CT has as much ionizing radiation as about 15 chest x-rays or 6 months of background radiation living in [place]    false positives: 96% of positive findings/nodules are NOT cancer, but some might still require additional diagnostic evaluation, including biopsy  over-diagnosis: some slow growing cancers that might never have been clinically significant will be detected and treated unnecessarily     The benefit of early detection of lung cancer is contingent upon adherence to annual screening or more frequent follow up if indicated.     Furthermore, reaping the benefits of screening requires Wilian Ness to be willing and physically able to undergo diagnostic procedures, if indicated. Although no specific guide is available for determining severity of comorbidities, it is reasonable to withhold screening in patients who have greater mortality risk from other diseases.     We did discuss that the only way to prevent lung cancer is to not smoke. Smoking cessation counseling was not given.      I did offer risk estimation using a calculator such as this one: 3%    ShouldIScreen

## 2022-04-20 NOTE — LETTER
Wilian Ness  [address]  [facility] [address]        April 21, 2022    Mr Ness,   Here is the print material about the low-dose lung CT screening   Sincerely  PHarper    Lung Cancer Screening Shared Decision Making Visit    Here are the  risks and benefits of screening for lung cancer with low-dose CT.     The risks include:  radiation exposure: one low dose chest CT has as much ionizing radiation as about 15 chest x-rays or 6 months of background radiation living in [place]    false positives: 96% of positive findings/nodules are NOT cancer, but some might still require additional diagnostic evaluation, including biopsy  over-diagnosis: some slow growing cancers that might never have been clinically significant will be detected and treated unnecessarily     The benefit of early detection of lung cancer is contingent upon adherence to annual screening or more frequent follow up if indicated.     Furthermore, reaping the benefits of screening requires Wilian Ness to be willing and physically able to undergo diagnostic procedures, if indicated. Although no specific guide is available for determining severity of comorbidities, it is reasonable to withhold screening in patients who have greater mortality risk from other diseases.     We did discuss that the only way to prevent lung cancer is to not smoke. Smoking cessation counseling was not given because he has already quit/    I did offer risk estimation using a calculator such as this one: Should/Screen - estimate 3%    ShouldIScreen  Should I Screen: Lung cancer screening decision aid       {TIP  The Should I Screen web page is not IE compatible. Please copy and paste link into a different browser if needed (Chrome or Edge) :

## 2022-04-20 NOTE — PATIENT INSTRUCTIONS
Lung cancer screening - issues to consider  Should I Screen: Lung cancer screening decision aid   Lung Cancer Screening   Frequently Asked Questions  If you are at high-risk for lung cancer, getting screened with low-dose computed tomography (LDCT) every year can help save your life. This handout offers answers to some of the most common questions about lung cancer screening. If you have other questions, please call [phone] ([phone]).     What is it?  Lung cancer screening uses special X-ray technology to create an image of your lung tissue. The exam is quick and easy and takes less than 10 seconds. We don t give you any medicine or use any needles. You can eat before and after the exam. You don t need to change your clothes as long as the clothing on your chest doesn t contain metal. But, you do need to be able to hold your breath for at least 6 seconds during the exam.    What is the goal of lung cancer screening?  The goal of lung cancer screening is to save lives. Many times, lung cancer is not found until a person starts having physical symptoms. Lung cancer screening can help detect lung cancer in the earliest stages when it may be easier to treat.    Who should be screened for lung cancer?  We suggest lung cancer screening for anyone who is at high-risk for lung cancer. You are in the high-risk group if you:      are between the ages of 55 and 79, and    have smoked at least 1 pack of cigarettes a day for 20 or more years, and    still smoke or have quit within the past 15 years.    However, if you have a new cough or shortness of breath, you should talk to your doctor before being screened.    Why does it matter if I have symptoms?  Certain symptoms can be a sign that you have a condition in your lungs that should be checked and treated by your doctor. These symptoms include fever, chest pain, a new or changing cough, shortness of breath that you have never felt before, coughing up blood or  unexplained weight loss. Having any of these symptoms can greatly affect the results of lung cancer screening.       Should all smokers get an LDCT lung cancer screening exam?  It depends. Lung cancer screening is for a very specific group of men and women who have a history of heavy smoking over a long period of time (see  Who should be screened for lung cancer  above).  I am in the high-risk group, but have been diagnosed with cancer in the past. Is LDCT lung cancer screening right for me?  In some cases, you should not have LDCT lung screening, such as when your doctor is already following your cancer with CT scan studies. Your doctor will help you decide if LDCT lung screening is right for you.  Do I need to have a screening exam every year?  Yes. If you are in the high-risk group described earlier, you should get an LDCT lung cancer screening exam every year until you are 79, or are no longer willing or able to undergo screening and possible procedures to diagnose and treat lung cancer.  How effective is LDCT at preventing death from lung cancer?  Studies have shown that LDCT lung cancer screening can lower the risk of death from lung cancer by 20 percent in people who are at high-risk.  What are the risks?  There are some risks and limitations of LDCT lung cancer screening. We want to make sure you understand the risks and benefits, so please let us know if you have any questions. Your doctor may want to talk with you more about these risks.    Radiation exposure: As with any exam that uses radiation, there is a very small increased risk of cancer. The amount of radiation in LDCT is small--about the same amount a person would get from a mammogram. Your doctor orders the exam when he or she feels the potential benefits outweigh the risks.    False negatives: No test is perfect, including LDCT. It is possible that you may have a medical condition, including lung cancer, that is not found during your exam. This  is called a false negative result.    False positives and more testing: LDCT very often finds something in the lung that could be cancer, but in fact is not. This is called a false positive result. False positive tests often cause anxiety. To make sure these findings are not cancer, you may need to have more tests. These tests will be done only if you give us permission. Sometimes patients need a treatment that can have side effects, such as a biopsy. For more information on false positives, see  What can I expect from the results?     Findings not related to lung cancer: Your LDCT exam also takes pictures of areas of your body next to your lungs. In a very small number of cases, the CT scan will show an abnormal finding in one of these areas, such as your kidneys, adrenal glands, liver or thyroid. This finding may not be serious, but you may need more tests. Your doctor can help you decide what other tests you may need, if any.  What can I expect from the results?  About 1 out of 4 LDCT exams will find something that may need more tests. Most of the time, these findings are lung nodules. Lung nodules are very small collections of tissue in the lung. These nodules are very common, and the vast majority--more than 97 percent--are not cancer (benign). Most are normal lymph nodes or small areas of scarring from past infections.  But, if a small lung nodule is found to be cancer, the cancer can be cured more than 90 percent of the time. To know if the nodule is cancer, we may need to get more images before your next yearly screening exam. If the nodule has suspicious features (for example, it is large, has an odd shape or grows over time), we will refer you to a specialist for further testing.  Will my doctor also get the results?  Yes. Your doctor will get a copy of your results.

## 2022-04-20 NOTE — LETTER
April 21, 2022      Wilian Ness  [address]  [facility] [address]        Dear Wilian,    Thank you for getting your care at [facility]. Please see below for your test results.  All your labs are good including kidney, liver, electrolytes, cholesterol, and A1c (diabetes).    Resulted Orders   Comprehensive metabolic panel   Result Value Ref Range    Sodium 138 133 - 144 mmol/L    Potassium 3.8 3.4 - 5.3 mmol/L    Chloride 102 94 - 109 mmol/L    Carbon Dioxide (CO2) 30 20 - 32 mmol/L    Anion Gap 6 3 - 14 mmol/L    Urea Nitrogen 19 7 - 30 mg/dL    Creatinine 0.98 0.66 - 1.25 mg/dL    Calcium 9.6 8.5 - 10.1 mg/dL    Glucose 96 70 - 99 mg/dL    Alkaline Phosphatase 81 40 - 150 U/L    AST 22 0 - 45 U/L    ALT 33 0 - 70 U/L    Protein Total 7.3 6.8 - 8.8 g/dL    Albumin 3.7 3.4 - 5.0 g/dL    Bilirubin Total 1.1 0.2 - 1.3 mg/dL    GFR Estimate 86 >60 mL/min/1.73m2      Comment:      Effective December 21, 2021 eGFRcr in adults is calculated using the 2021 CKD-EPI creatinine equation which includes age and gender (Hunter et al., NE, DOI: 10.1056/TROBrc4670950)   Lipid panel reflex to direct LDL Non-fasting   Result Value Ref Range    Cholesterol 186 <200 mg/dL    Triglycerides 254 (H) <150 mg/dL    Direct Measure HDL 50 >=40 mg/dL    LDL Cholesterol Calculated 85 <=100 mg/dL    Non HDL Cholesterol 136 (H) <130 mg/dL    Patient Fasting > 8hrs? Unknown     Narrative    Cholesterol  Desirable:  <200 mg/dL    Triglycerides  Normal:  Less than 150 mg/dL  Borderline High:  150-199 mg/dL  High:  200-499 mg/dL  Very High:  Greater than or equal to 500 mg/dL    Direct Measure HDL  Female:  Greater than or equal to 50 mg/dL   Male:  Greater than or equal to 40 mg/dL    LDL Cholesterol  Desirable:  <100mg/dL  Above Desirable:  100-129 mg/dL   Borderline High:  130-159 mg/dL   High:  160-189 mg/dL   Very High:  >= 190 mg/dL    Non HDL Cholesterol  Desirable:  130 mg/dL  Above Desirable:  130-159 mg/dL  Borderline High:   160-189 mg/dL  High:  190-219 mg/dL  Very High:  Greater than or equal to 220 mg/dL   Hemoglobin A1c   Result Value Ref Range    Hemoglobin A1C 5.4 0.0 - 5.6 %      Comment:      Normal <5.7%   Prediabetes 5.7-6.4%    Diabetes 6.5% or higher     Note: Adopted from ADA consensus guidelines.           Sincerely,    Bran Loza MD

## 2022-07-06 DIAGNOSIS — E78.5 HYPERLIPIDEMIA LDL GOAL <130: ICD-10-CM

## 2022-07-06 NOTE — TELEPHONE ENCOUNTER
"Request for medication refill:    atorvastatin (LIPITOR) 40 MG tablet    Providers if patient needs an appointment and you are willing to give a one month supply please refill for one month and  send a letter/MyChart using \".SMILLIMITEDREFILL\" .smillimited and route chart to \"P SMI \" (Giving one month refill in non controlled medications is strongly recommended before denial)    If refill has been denied, meaning absolutely no refills without visit, please complete the smart phrase \".smirxrefuse\" and route it to the \"P SMI MED REFILLS\"  pool to inform the patient and the pharmacy.    Odessa Lowery, CMA        "

## 2022-07-07 RX ORDER — ATORVASTATIN CALCIUM 40 MG/1
40 TABLET, FILM COATED ORAL AT BEDTIME
Qty: 90 TABLET | Refills: 3 | Status: SHIPPED | OUTPATIENT
Start: 2022-07-07 | End: 2023-04-18

## 2022-12-16 ENCOUNTER — OFFICE VISIT (OUTPATIENT)
Dept: FAMILY MEDICINE | Facility: CLINIC | Age: 67
End: 2022-12-16
Payer: COMMERCIAL

## 2022-12-16 VITALS
SYSTOLIC BLOOD PRESSURE: 150 MMHG | DIASTOLIC BLOOD PRESSURE: 77 MMHG | OXYGEN SATURATION: 98 % | HEART RATE: 67 BPM | BODY MASS INDEX: 33.18 KG/M2 | HEIGHT: 67 IN | TEMPERATURE: 98.7 F | RESPIRATION RATE: 16 BRPM | WEIGHT: 211.4 LBS

## 2022-12-16 DIAGNOSIS — M62.838 SPASM OF PIRIFORMIS MUSCLE: Primary | ICD-10-CM

## 2022-12-16 DIAGNOSIS — I10 ESSENTIAL HYPERTENSION WITH GOAL BLOOD PRESSURE LESS THAN 140/90: ICD-10-CM

## 2022-12-16 DIAGNOSIS — Z23 ENCOUNTER FOR IMMUNIZATION: ICD-10-CM

## 2022-12-16 DIAGNOSIS — M25.551 HIP PAIN, RIGHT: ICD-10-CM

## 2022-12-16 DIAGNOSIS — Z23 NEED FOR PROPHYLACTIC VACCINATION AND INOCULATION AGAINST INFLUENZA: ICD-10-CM

## 2022-12-16 PROCEDURE — 90662 IIV NO PRSV INCREASED AG IM: CPT | Performed by: FAMILY MEDICINE

## 2022-12-16 PROCEDURE — 99213 OFFICE O/P EST LOW 20 MIN: CPT | Mod: 25 | Performed by: FAMILY MEDICINE

## 2022-12-16 PROCEDURE — 90471 IMMUNIZATION ADMIN: CPT | Performed by: FAMILY MEDICINE

## 2022-12-16 PROCEDURE — 90472 IMMUNIZATION ADMIN EACH ADD: CPT | Performed by: FAMILY MEDICINE

## 2022-12-16 PROCEDURE — 90677 PCV20 VACCINE IM: CPT | Performed by: FAMILY MEDICINE

## 2022-12-16 RX ORDER — IBUPROFEN 600 MG/1
600 TABLET, FILM COATED ORAL EVERY 6 HOURS PRN
Qty: 30 TABLET | Refills: 1 | Status: SHIPPED | OUTPATIENT
Start: 2022-12-16 | End: 2024-05-17

## 2022-12-16 NOTE — PATIENT INSTRUCTIONS
1) I think this is a spasm in your piriformis muscle. Stretching physical therapy and ibuprofen can help.   2) Return if you are not better after doing 4 weeks of physical therapy or for any other concerns.     Leticia Peguero, DO

## 2022-12-16 NOTE — PROGRESS NOTES
"  Assessment & Plan     Spasm of piriformis muscle  Suspect pifiromis spasm as cause.   Referred to PT.   Trial ibuprofen (pt wanted something he could take more frequently throughout the day).   - ibuprofen (ADVIL/MOTRIN) 600 MG tablet  Dispense: 30 tablet; Refill: 1  - Physical Therapy Referral    Hip pain, right  - ibuprofen (ADVIL/MOTRIN) 600 MG tablet  Dispense: 30 tablet; Refill: 1  - Physical Therapy Referral    Essential hypertension with goal blood pressure less than 140/90  Not at goal today but likely because he hasn't taken his medications yet.   No medication changes.      BMI:   Estimated body mass index is 33.57 kg/m  as calculated from the following:    Height as of this encounter: 1.69 m (5' 6.54\").    Weight as of this encounter: 95.9 kg (211 lb 6.4 oz).       Return in about 4 weeks (around 1/13/2023), or if symptoms worsen or fail to improve.    Leticia Peguero DO  [facility] GRACE Cedeno is a 67 year old, presenting for the following health issues:  Back Pain (RIGHT leg, hip and gluteal pain. Started intermittently at work then has increased about 1 month ago. /Now happening with any activity (walking, lifting). Improves with rest over the weekend but will be triggered again by strenuous activity and if recently had a flare, easier to trigger again./Hasn't tried any exercises, just rest. Did take sig others higher dose rx celecoxib and this is helping)      HPI     1 month of R hip/gluteal pain.   Active at work - trims trees  Sharp pain in upper part of leg, doesn't last a long time. Goes away with rest.   Did have a fall a few years ago in this area but didn't really bother him much until about 1 month ago.   Worse with going up stairs.   Tried some of girlfriend's celebrex which seemed to help partially.     Regarding BP - did not take medications this morning. Reviewed prior values and has been a goal. He does not check his blood pressure at home.     Review of " "Systems   Constitutional, HEENT, cardiovascular, pulmonary, gi and gu systems are negative, except as otherwise noted.      Objective    BP (!) 150/77   Pulse 67   Temp 98.7  F (37.1  C) (Oral)   Resp 16   Ht 1.69 m (5' 6.54\")   Wt 95.9 kg (211 lb 6.4 oz)   SpO2 98%   BMI 33.57 kg/m    Body mass index is 33.57 kg/m .  Physical Exam  Constitutional:       Appearance: He is well-developed and well-nourished.   HENT:      Head: Normocephalic and atraumatic.   Eyes:      Conjunctiva/sclera: Conjunctivae normal.   Pulmonary:      Effort: Pulmonary effort is normal.   Musculoskeletal:         General: Normal range of motion.      Cervical back: Normal range of motion and neck supple.      Comments: Limited flexion of lumbar spine. Normal rotation/extension. Painless ROM of bilateral hips and lumbar spine. Negative JONO bilaterally. Normal strength and sensation BLE. Tenderness over R piriformis muscle.    Skin:     General: Skin is warm and dry.   Neurological:      Mental Status: He is alert and oriented to person, place, and time.   Psychiatric:         Mood and Affect: Mood and affect normal.         Behavior: Behavior normal.         Thought Content: Thought content normal.         Judgment: Judgment normal.                    "

## 2022-12-22 ENCOUNTER — THERAPY VISIT (OUTPATIENT)
Dept: PHYSICAL THERAPY | Facility: CLINIC | Age: 67
End: 2022-12-22
Attending: FAMILY MEDICINE
Payer: COMMERCIAL

## 2022-12-22 DIAGNOSIS — M25.551 HIP PAIN, RIGHT: ICD-10-CM

## 2022-12-22 DIAGNOSIS — M62.838 SPASM OF PIRIFORMIS MUSCLE: ICD-10-CM

## 2022-12-22 PROCEDURE — 97110 THERAPEUTIC EXERCISES: CPT | Mod: GP | Performed by: PHYSICAL THERAPIST

## 2022-12-22 PROCEDURE — 97161 PT EVAL LOW COMPLEX 20 MIN: CPT | Mod: GP | Performed by: PHYSICAL THERAPIST

## 2022-12-22 ASSESSMENT — ACTIVITIES OF DAILY LIVING (ADL)
LIGHT_TO_MODERATE_WORK: NO DIFFICULTY AT ALL
WALKING_APPROXIMATELY_10_MINUTES: MODERATE DIFFICULTY
HOS_ADL_SCORE(%): 72.06
WALKING_UP_STEEP_HILLS: MODERATE DIFFICULTY
GOING_UP_1_FLIGHT_OF_STAIRS: MODERATE DIFFICULTY
DEEP_SQUATTING: MODERATE DIFFICULTY
STEPPING_UP_AND_DOWN_CURBS: NO DIFFICULTY AT ALL
HOS_ADL_ITEM_SCORE_TOTAL: 49
HEAVY_WORK: MODERATE DIFFICULTY
WALKING_15_MINUTES_OR_GREATER: MODERATE DIFFICULTY
HOS_ADL_HIGHEST_POTENTIAL_SCORE: 68
WALKING_DOWN_STEEP_HILLS: SLIGHT DIFFICULTY
TWISTING/PIVOTING_ON_INVOLVED_LEG: SLIGHT DIFFICULTY
STANDING_FOR_15_MINUTES: NO DIFFICULTY AT ALL
GETTING_INTO_AND_OUT_OF_AN_AVERAGE_CAR: SLIGHT DIFFICULTY
PUTTING_ON_SOCKS_AND_SHOES: SLIGHT DIFFICULTY
WALKING_INITIALLY: NO DIFFICULTY AT ALL
RECREATIONAL_ACTIVITIES: MODERATE DIFFICULTY
HOS_ADL_COUNT: 17
HOW_WOULD_YOU_RATE_YOUR_CURRENT_LEVEL_OF_FUNCTION_DURING_YOUR_USUAL_ACTIVITIES_OF_DAILY_LIVING_FROM_0_TO_100_WITH_100_BEING_YOUR_LEVEL_OF_FUNCTION_PRIOR_TO_YOUR_HIP_PROBLEM_AND_0_BEING_THE_INABILITY_TO_PERFORM_ANY_OF_YOUR_USUAL_DAILY_ACTIVITIES?: 75
ROLLING_OVER_IN_BED: NO DIFFICULTY AT ALL
GOING_DOWN_1_FLIGHT_OF_STAIRS: NO DIFFICULTY AT ALL
SITTING_FOR_15_MINUTES: NO DIFFICULTY AT ALL
GETTING_INTO_AND_OUT_OF_A_BATHTUB: MODERATE DIFFICULTY

## 2022-12-22 NOTE — PROGRESS NOTES
"Physical Therapy Initial Evaluation  Subjective:  The history is provided by the patient. No  was used.   Patient Health History  Wilian Ness being seen for recurring pain/spasms in right \"piriformis\" muscle.     Date of Onset: became worse 2 months ago.   Problem occurred: possible fall 2 years ago, probably overuse    Pain is reported as 1/10 on pain scale.  General health as reported by patient is good.  Pertinent medical history includes: high blood pressure and smoking. Other medical history details: ragweed .            Current medications:  High blood pressure medication.       Primary job tasks include:  Lifting/carrying and pushing/pulling.                  Therapist Generated HPI Evaluation  Problem details: Patient reports some right hip pain that has been ongoing for about .. 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  Notices pain in the muscle after doing that, goes away for a little while.  Has been having this pain now with walking through snow, up stairs, or up hill, these activities are now putting him in a lot of pain.  Iburprofen 600 has been helping.  Has history of fall on that hip a few years ago.      Current activity: work, gets home and takes a nap and relaxes.    Goal: Wants the pain to go away.  .         Type of problem:  Right hip.    This is a recurrent condition.  Condition occurred with:  Repetition/overuse.  Where condition occurred: at work.  Patient reports pain:  Posterior.  Pain is described as aching and sharp and is intermittent.  Pain radiates to:  Thigh and gluteals. Pain timing: worse with physical activity.  Since onset symptoms are unchanged.  Symptoms are exacerbated by ascending stairs, activity, bending/squatting and walking  and relieved by NSAID's.    There was none improvement following previous treatment.  Restrictions due to condition include:  " Working in normal job without restrictions.                          Objective:  System         Lumbar/SI Evaluation  ROM:    AROM Lumbar:   Flexion:          Mod rest   Ext:                    Mod rest    Side Bend:        Left:     Right:   Rotation:           Left:  Wnl    Right:  Wnl  Side Glide:        Left:     Right:                                                               Hip Evaluation  Hip PROM:    Flexion: Left: wnl   Right: wnl  Extension: Left: min rest   Right: mod rest   Abduction: Left: wnl    Right: wnl    Internal Rotation: Left: min rest    Right: mod rest  External Rotation: Left: wnl    Right: wnl              Hip Strength:    Flexion:   Left: 5-/5   Pain:  Right: 5-/5   Pain:                    Extension:  Left: 4+/5  Pain:Right: 4-/5    Pain:    Abduction:  Left: 4/5     Pain:Right: 3+/5    Pain:    Internal Rotation:  Left: 5-/5    Pain:Right: 4+/5   Pain:  External Rotation:  Left: 5-/5   Pain:  Right: 4+/5   Pain:            Hip Special Testing:      Left hip negative for the following special tests:  Felecia; Fadir/Labrum or SLR   Right hip positive for the following special tests:  ThomasRight hip negative for the following special tests:  Felecia; Fadir/Labrum or SLR    Hip Palpation:      Right hip tenderness present at:  Gluteus Medius  Functional Testing:          Quad:      Bilateral leg squat:  Mild loss of control and decreased hip/trunk flexion                  General     ROS    Assessment/Plan:    Patient is a 67 year old male with right side hip complaints.    Patient has the following significant findings with corresponding treatment plan.                Diagnosis 1:  R hip pain   Pain -  manual therapy, self management, education and home program  Decreased ROM/flexibility - manual therapy and therapeutic exercise  Decreased strength - therapeutic exercise and therapeutic activities  Impaired muscle performance - neuro re-education  Decreased function - therapeutic  activities    Therapy Evaluation Codes:   1) History comprised of:   Personal factors that impact the plan of care:      None.    Comorbidity factors that impact the plan of care are:      None.     Medications impacting care: None.  2) Examination of Body Systems comprised of:   Body structures and functions that impact the plan of care:      Hip.   Activity limitations that impact the plan of care are:      Bending, Lifting, Sitting, Squatting/kneeling, Stairs, Standing, Walking and Working.  3) Clinical presentation characteristics are:   Stable/Uncomplicated.  4) Decision-Making    Low complexity using standardized patient assessment instrument and/or measureable assessment of functional outcome.  Cumulative Therapy Evaluation is: Low complexity.    Previous and current functional limitations:  (See Goal Flow Sheet for this information)    Short term and Long term goals: (See Goal Flow Sheet for this information)     Communication ability:  Patient appears to be able to clearly communicate and understand verbal and written communication and follow directions correctly.  Treatment Explanation - The following has been discussed with the patient:   RX ordered/plan of care  Anticipated outcomes  Possible risks and side effects  This patient would benefit from PT intervention to resume normal activities.   Rehab potential is excellent.    Frequency:  1 X week, once daily  Duration:  for 8 weeks  Discharge Plan:  Achieve all LTG.  Independent in home treatment program.  Reach maximal therapeutic benefit.    Please refer to the daily flowsheet for treatment today, total treatment time and time spent performing 1:1 timed codes.

## 2022-12-30 ENCOUNTER — THERAPY VISIT (OUTPATIENT)
Dept: PHYSICAL THERAPY | Facility: CLINIC | Age: 67
End: 2022-12-30
Payer: COMMERCIAL

## 2022-12-30 DIAGNOSIS — M62.838 SPASM OF PIRIFORMIS MUSCLE: Primary | ICD-10-CM

## 2022-12-30 DIAGNOSIS — M25.551 HIP PAIN, RIGHT: ICD-10-CM

## 2022-12-30 PROCEDURE — 97530 THERAPEUTIC ACTIVITIES: CPT | Mod: GP

## 2022-12-30 PROCEDURE — 97110 THERAPEUTIC EXERCISES: CPT | Mod: 59

## 2023-01-05 ENCOUNTER — THERAPY VISIT (OUTPATIENT)
Dept: PHYSICAL THERAPY | Facility: CLINIC | Age: 68
End: 2023-01-05
Payer: COMMERCIAL

## 2023-01-05 DIAGNOSIS — M62.838 SPASM OF PIRIFORMIS MUSCLE: Primary | ICD-10-CM

## 2023-01-05 DIAGNOSIS — M25.551 HIP PAIN, RIGHT: ICD-10-CM

## 2023-01-05 PROCEDURE — 97530 THERAPEUTIC ACTIVITIES: CPT | Mod: GP

## 2023-01-05 PROCEDURE — 97110 THERAPEUTIC EXERCISES: CPT | Mod: GP

## 2023-01-13 ENCOUNTER — THERAPY VISIT (OUTPATIENT)
Dept: PHYSICAL THERAPY | Facility: CLINIC | Age: 68
End: 2023-01-13
Payer: COMMERCIAL

## 2023-01-13 DIAGNOSIS — M62.838 SPASM OF PIRIFORMIS MUSCLE: Primary | ICD-10-CM

## 2023-01-13 DIAGNOSIS — M25.551 HIP PAIN, RIGHT: ICD-10-CM

## 2023-01-13 PROCEDURE — 97140 MANUAL THERAPY 1/> REGIONS: CPT | Mod: GP

## 2023-01-13 PROCEDURE — 97110 THERAPEUTIC EXERCISES: CPT | Mod: GP

## 2023-01-26 ENCOUNTER — THERAPY VISIT (OUTPATIENT)
Dept: PHYSICAL THERAPY | Facility: CLINIC | Age: 68
End: 2023-01-26
Payer: COMMERCIAL

## 2023-01-26 DIAGNOSIS — M62.838 SPASM OF PIRIFORMIS MUSCLE: Primary | ICD-10-CM

## 2023-01-26 DIAGNOSIS — M25.551 HIP PAIN, RIGHT: ICD-10-CM

## 2023-01-26 PROCEDURE — 97530 THERAPEUTIC ACTIVITIES: CPT | Mod: GP

## 2023-01-26 PROCEDURE — 97110 THERAPEUTIC EXERCISES: CPT | Mod: GP

## 2023-02-02 ENCOUNTER — THERAPY VISIT (OUTPATIENT)
Dept: PHYSICAL THERAPY | Facility: CLINIC | Age: 68
End: 2023-02-02
Payer: COMMERCIAL

## 2023-02-02 DIAGNOSIS — M62.838 SPASM OF PIRIFORMIS MUSCLE: Primary | ICD-10-CM

## 2023-02-02 DIAGNOSIS — M25.551 HIP PAIN, RIGHT: ICD-10-CM

## 2023-02-02 PROCEDURE — 97140 MANUAL THERAPY 1/> REGIONS: CPT | Mod: GP

## 2023-02-02 PROCEDURE — 97530 THERAPEUTIC ACTIVITIES: CPT | Mod: GP

## 2023-02-02 PROCEDURE — 97110 THERAPEUTIC EXERCISES: CPT | Mod: GP

## 2023-02-02 ASSESSMENT — ACTIVITIES OF DAILY LIVING (ADL)
HOS_ADL_ITEM_SCORE_TOTAL: 51
HOS_ADL_ITEM_SCORE_TOTAL: 51
GOING_UP_1_FLIGHT_OF_STAIRS: SLIGHT DIFFICULTY
TWISTING/PIVOTING_ON_INVOLVED_LEG: NO DIFFICULTY AT ALL
TWISTING/PIVOTING_ON_INVOLVED_LEG: NO DIFFICULTY AT ALL
HOS_ADL_COUNT: 17
HEAVY_WORK: SLIGHT DIFFICULTY
GOING_DOWN_1_FLIGHT_OF_STAIRS: NO DIFFICULTY AT ALL
WALKING_15_MINUTES_OR_GREATER: EXTREME DIFFICULTY
HOS_ADL_SCORE(%): 75
STANDING_FOR_15_MINUTES: SLIGHT DIFFICULTY
STEPPING_UP_AND_DOWN_CURBS: NO DIFFICULTY AT ALL
GETTING_INTO_AND_OUT_OF_AN_AVERAGE_CAR: SLIGHT DIFFICULTY
PUTTING_ON_SOCKS_AND_SHOES: NO DIFFICULTY AT ALL
HOS_ADL_SCORE(%): 75
SITTING_FOR_15_MINUTES: NO DIFFICULTY AT ALL
RECREATIONAL_ACTIVITIES: MODERATE DIFFICULTY
STEPPING_UP_AND_DOWN_CURBS: NO DIFFICULTY AT ALL
LIGHT_TO_MODERATE_WORK: NO DIFFICULTY AT ALL
ROLLING_OVER_IN_BED: NO DIFFICULTY AT ALL
WALKING_INITIALLY: NO DIFFICULTY AT ALL
WALKING_DOWN_STEEP_HILLS: SLIGHT DIFFICULTY
DEEP_SQUATTING: SLIGHT DIFFICULTY
DEEP_SQUATTING: SLIGHT DIFFICULTY
GOING_UP_1_FLIGHT_OF_STAIRS: SLIGHT DIFFICULTY
LIGHT_TO_MODERATE_WORK: NO DIFFICULTY AT ALL
HEAVY_WORK: SLIGHT DIFFICULTY
PUTTING_ON_SOCKS_AND_SHOES: NO DIFFICULTY AT ALL
WALKING_UP_STEEP_HILLS: MODERATE DIFFICULTY
WALKING_15_MINUTES_OR_GREATER: EXTREME DIFFICULTY
SITTING_FOR_15_MINUTES: NO DIFFICULTY AT ALL
WALKING_APPROXIMATELY_10_MINUTES: EXTREME DIFFICULTY
GETTING_INTO_AND_OUT_OF_AN_AVERAGE_CAR: SLIGHT DIFFICULTY
WALKING_INITIALLY: NO DIFFICULTY AT ALL
GETTING_INTO_AND_OUT_OF_A_BATHTUB: SLIGHT DIFFICULTY
HOS_ADL_COUNT: 17
GETTING_INTO_AND_OUT_OF_A_BATHTUB: SLIGHT DIFFICULTY
HOS_ADL_HIGHEST_POTENTIAL_SCORE: 68
WALKING_UP_STEEP_HILLS: MODERATE DIFFICULTY
WALKING_APPROXIMATELY_10_MINUTES: EXTREME DIFFICULTY
STANDING_FOR_15_MINUTES: SLIGHT DIFFICULTY
RECREATIONAL_ACTIVITIES: MODERATE DIFFICULTY
WALKING_DOWN_STEEP_HILLS: SLIGHT DIFFICULTY
GOING_DOWN_1_FLIGHT_OF_STAIRS: NO DIFFICULTY AT ALL
ROLLING_OVER_IN_BED: NO DIFFICULTY AT ALL
HOS_ADL_HIGHEST_POTENTIAL_SCORE: 68

## 2023-02-02 NOTE — PROGRESS NOTES
Subjective:  HPI  Physical Exam                    Objective:  System    Physical Exam    General     ROS    Assessment/Plan:    PROGRESS  REPORT    Progress reporting period is from 12/22/22 to 2/2/23.   Wilian demonstrates fair progress following R hip pain.  Primary pain persisting is with walking in which he demonstrates poor tolerance with 2 minutes on treadmill inducing 5/10 hip pain.  He will benefit from HEP strengthening/stretching and plans to trial 4-6 week independent management, with possible re-assessment of progress in 4-6 weeks.  Pt demonstrates continued tenderness to piriformis/glute max, however also possible suspicion for hip intraarticular pathology due to c-sign pain pattern and decreased progress with current interventions of manual therapy, strengthening, and stretching.    SUBJECTIVE  Subjective changes noted by patient:  .  Subjective: Pt took week of work off due to excess PTO, hip feeling good without having demands of work.  Pt did have instance over a week ago in which he was walking a couple blocks and had significant hip pain where he had to sit down - rest did resolve this pain.  When not active he has minimal to no pain, questions if its muscle pain or the joint itself, but overall feels the strength is helping    Current pain level is 3/10  .     Previous pain level was  5/10  .   Changes in function:  Yes (See Goal flowsheet attached for changes in current functional level)  Adverse reaction to treatment or activity: None    OBJECTIVE  Changes noted in objective findings:  Yes  Objective: Treadmil 1.2 mph for 2 minutes produced R hip pain from 0/10 to 5/10 in R hip (piriformis area).  R hip ER to 55, IR to 40. JONO (+) on R, negative scour bilaterally.    ASSESSMENT/PLAN  Updated problem list and treatment plan: Diagnosis 1:  R piriformis pain  Pain -  hot/cold therapy, manual therapy, self management, education and home program  Decreased ROM/flexibility - manual therapy,  therapeutic exercise and home program  Decreased strength - therapeutic exercise, therapeutic activities and home program  Impaired gait - gait training and home program  Impaired muscle performance - neuro re-education and home program  Decreased function - therapeutic activities and home program  STG/LTGs have been met or progress has been made towards goals:  Yes (See Goal flow sheet completed today.)  Assessment of Progress: The patient's condition is improving.  The patient's condition has potential to improve.  The patient's progress has slowed.  Patient is meeting short term goals and is progressing towards long term goals.  Self Management Plans:  Patient has been instructed in a home treatment program.  Patient  has been instructed in self management of symptoms.  I have re-evaluated this patient and find that the nature, scope, duration and intensity of the therapy is appropriate for the medical condition of the patient.  Wilian continues to require the following intervention to meet STG and LTG's:  PT    Recommendations:  This patient would benefit from continued therapy.     Frequency:  1 X follow up in 4-6 weeks to re-assess progress          Please refer to the daily flowsheet for treatment today, total treatment time and time spent performing 1:1 timed codes.       no

## 2023-04-02 DIAGNOSIS — I10 ESSENTIAL HYPERTENSION WITH GOAL BLOOD PRESSURE LESS THAN 140/90: ICD-10-CM

## 2023-04-02 NOTE — LETTER
4/2/2023         RE: Wilian Ness  [address]  [facility] [address]            Wiilan Ness  [address]  [facility] [address]    April 3, 2023        Dear Wilian Ness,   I refilled your medication today for 90 days.  It is time for an appointment and bloodwork  Please call [phone] to schedule an appointment soon.  I am retiring soon. I will be recommending a colleague in May to be your primary care physician.      Sincerely    PHarper

## 2023-04-03 RX ORDER — LISINOPRIL AND HYDROCHLOROTHIAZIDE 20; 25 MG/1; MG/1
1 TABLET ORAL DAILY
Qty: 90 TABLET | Refills: 0 | Status: SHIPPED | OUTPATIENT
Start: 2023-04-03 | End: 2023-04-18

## 2023-04-03 NOTE — TELEPHONE ENCOUNTER
"Request for medication refill:  lisinopril-hydrochlorothiazide (ZESTORETIC) 20-25 MG tablet    Providers if patient needs an appointment and you are willing to give a one month supply please refill for one month and  send a letter/MyChart using \".SMILLIMITEDREFILL\" .smillimited and route chart to \"P [facility] \" (Giving one month refill in non controlled medications is strongly recommended before denial)    If refill has been denied, meaning absolutely no refills without visit, please complete the smart phrase \".smirxrefuse\" and route it to the \"P SMI MED REFILLS\"  pool to inform the patient and the pharmacy.    Anabela Eduardo MA        "

## 2023-04-18 ENCOUNTER — OFFICE VISIT (OUTPATIENT)
Dept: FAMILY MEDICINE | Facility: CLINIC | Age: 68
End: 2023-04-18
Payer: COMMERCIAL

## 2023-04-18 VITALS
SYSTOLIC BLOOD PRESSURE: 129 MMHG | HEART RATE: 55 BPM | WEIGHT: 210.4 LBS | DIASTOLIC BLOOD PRESSURE: 83 MMHG | BODY MASS INDEX: 33.02 KG/M2 | HEIGHT: 67 IN | TEMPERATURE: 97.8 F | OXYGEN SATURATION: 96 % | RESPIRATION RATE: 16 BRPM

## 2023-04-18 DIAGNOSIS — E78.5 HYPERLIPIDEMIA LDL GOAL <130: ICD-10-CM

## 2023-04-18 DIAGNOSIS — I10 ESSENTIAL HYPERTENSION WITH GOAL BLOOD PRESSURE LESS THAN 140/90: Primary | ICD-10-CM

## 2023-04-18 LAB
ALBUMIN SERPL BCG-MCNC: 4.2 G/DL (ref 3.5–5.2)
ALP SERPL-CCNC: 81 U/L (ref 40–129)
ALT SERPL W P-5'-P-CCNC: 25 U/L (ref 10–50)
ANION GAP SERPL CALCULATED.3IONS-SCNC: 15 MMOL/L (ref 7–15)
AST SERPL W P-5'-P-CCNC: 28 U/L (ref 10–50)
BILIRUB SERPL-MCNC: 1.5 MG/DL
BUN SERPL-MCNC: 10.6 MG/DL (ref 8–23)
CALCIUM SERPL-MCNC: 9.7 MG/DL (ref 8.8–10.2)
CHLORIDE SERPL-SCNC: 100 MMOL/L (ref 98–107)
CHOLEST SERPL-MCNC: 205 MG/DL
CREAT SERPL-MCNC: 0.97 MG/DL (ref 0.67–1.17)
DEPRECATED HCO3 PLAS-SCNC: 25 MMOL/L (ref 22–29)
GFR SERPL CREATININE-BSD FRML MDRD: 86 ML/MIN/1.73M2
GLUCOSE SERPL-MCNC: 106 MG/DL (ref 70–99)
HBA1C MFR BLD: 5.4 % (ref 0–5.6)
HDLC SERPL-MCNC: 50 MG/DL
LDLC SERPL CALC-MCNC: 116 MG/DL
NONHDLC SERPL-MCNC: 155 MG/DL
POTASSIUM SERPL-SCNC: 3.7 MMOL/L (ref 3.4–5.3)
PROT SERPL-MCNC: 7.2 G/DL (ref 6.4–8.3)
SODIUM SERPL-SCNC: 140 MMOL/L (ref 136–145)
TRIGL SERPL-MCNC: 193 MG/DL

## 2023-04-18 PROCEDURE — 80053 COMPREHEN METABOLIC PANEL: CPT | Performed by: FAMILY MEDICINE

## 2023-04-18 PROCEDURE — 80061 LIPID PANEL: CPT | Performed by: FAMILY MEDICINE

## 2023-04-18 PROCEDURE — 83036 HEMOGLOBIN GLYCOSYLATED A1C: CPT | Performed by: FAMILY MEDICINE

## 2023-04-18 PROCEDURE — 36415 COLL VENOUS BLD VENIPUNCTURE: CPT | Performed by: FAMILY MEDICINE

## 2023-04-18 PROCEDURE — 99214 OFFICE O/P EST MOD 30 MIN: CPT | Performed by: FAMILY MEDICINE

## 2023-04-18 RX ORDER — ATORVASTATIN CALCIUM 40 MG/1
40 TABLET, FILM COATED ORAL AT BEDTIME
Qty: 90 TABLET | Refills: 3 | Status: SHIPPED | OUTPATIENT
Start: 2023-04-18 | End: 2023-06-07

## 2023-04-18 RX ORDER — ATENOLOL 100 MG/1
100 TABLET ORAL DAILY
Qty: 90 TABLET | Refills: 3 | Status: SHIPPED | OUTPATIENT
Start: 2023-04-18 | End: 2024-05-07

## 2023-04-18 RX ORDER — LISINOPRIL AND HYDROCHLOROTHIAZIDE 20; 25 MG/1; MG/1
1 TABLET ORAL DAILY
Qty: 90 TABLET | Refills: 3 | Status: SHIPPED | OUTPATIENT
Start: 2023-04-18 | End: 2023-06-07

## 2023-04-18 ASSESSMENT — ENCOUNTER SYMPTOMS
RHINORRHEA: 0
BACK PAIN: 1
DIZZINESS: 0
DYSURIA: 0
MYALGIAS: 1
VOMITING: 0
CHEST TIGHTNESS: 0
DIARRHEA: 0
FEVER: 0
SHORTNESS OF BREATH: 0
APPETITE CHANGE: 0
CONSTIPATION: 0
NAUSEA: 0
ABDOMINAL PAIN: 0
HEADACHES: 0
COLOR CHANGE: 0
SORE THROAT: 0

## 2023-04-18 NOTE — PROGRESS NOTES
Assessment & Plan     Essential hypertension with goal blood pressure less than 140/90  At prior visit, was not yet taking medications that were prescribed. Was recommended to start lisinopril-hydrochlorothiazide which patient has not been taking. Good response to medication, BP today 129/83. Recommend continuing current medications. Encouraged exercise which patient does a lot of at work which is physically demanding, as well as PT exercises. Notably, patient's diet consists of protein with few fruits/vegetables. In addition, patient endorses drinking soda at work, beer after work, and little water. At next visit, consider discussing food/liquid intake habits and if patient is interested in making any changes.   - atenolol (TENORMIN) 100 MG tablet  Dispense: 90 tablet; Refill: 3  - lisinopril-hydrochlorothiazide (ZESTORETIC) 20-25 MG tablet  Dispense: 90 tablet; Refill: 3  - Hemoglobin A1c  - Comprehensive metabolic panel    Hyperlipidemia LDL goal <130  Last lab check on 4/20/22 notable for triglycerides of 254. Otherwise at goal.    - atorvastatin (LIPITOR) 40 MG tablet  Dispense: 90 tablet; Refill: 3  - Comprehensive metabolic panel  - Lipid panel reflex to direct LDL Non-fasting    Former smoker  Former smoker for 40 years, 12 years ago. Offered lung cancer CT screening and AAA US screening. Patient hesitant about lung cancer CT screening due to low specificity and cost of screening. More interested in AAA US screening. Provided patient with information for screenings and he will look into what is covered by his insurance.     Pyriformis Syndrome  Finished PT  Improved after manual therapy into spasmed muscle  Encouraged continued HEP      Transfer of Care  Dr Peguero (he has met her previous visit)                 Slime Schulz, MS3  Physician Attestation   I, Bran Loza MD, was present with the medical student who participated in the service and in the documentation of the note.  I have verified the history  and personally performed the physical exam and medical decision making.  I agree with the assessment and plan of care as documented in the note.    MD Bran Alvarez MD  [facility] GRACE Cedeno is a 68 year old, presenting for the following health issues:  Physical (Pt states that he is here for a general check up and medication refills)        12/16/2022     1:05 PM   Additional Questions   Roomed by Diana   Accompanied by Self     HPI   68 year old with PMH of HTN, HLD, former smoker, BPH, back pain, hip pain who presents to clinic today for BP check and BP medication management.    Essential HTN, goal <140/90  HLD  - Meds: Lisinopril hydrochlorothiazide 20-25 daily, atenolol 100mg daily, Lipitor 40mg daily am  - Not checking BP at home  - Labs 4/20/22 (see below): A1c 5.4, triglycerides 254    CT chest ordered at last visit for lung cancer screening, not yet done  AAA screening recommended at next yearly visit    Going to PT for hip/back pain. Experienced more/worse pain than regular but PT is helping. 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     Social history  - Working as /  - Will be retiring at 68.5 (September)  - Prior smoker x 40 years, quit 12 years ago  - Diet: 3 meals a day, not many veggies/fruit, protein - Sausage, eggs, hot dogs, chicken, fish/shrimp occasionally; occasionally salad. Drinking mostly soda at work (a couple/day), beer after work, not much water/milk/coffee  - Exercise:  at work- active/on feet all day long            Review of Systems   Constitutional: Negative for appetite change and fever.   HENT: Negative for congestion, rhinorrhea and sore throat.    Respiratory: Negative for chest tightness and shortness of breath.    Cardiovascular: Negative for chest pain.   Gastrointestinal: Negative for abdominal pain, constipation, diarrhea, nausea  "and vomiting.   Genitourinary: Negative for dysuria.   Musculoskeletal: Positive for back pain and myalgias.   Skin: Negative for color change and rash.   Neurological: Negative for dizziness and headaches.            Objective    /83   Pulse 55   Temp 97.8  F (36.6  C) (Oral)   Resp 16   Ht 1.712 m (5' 7.4\")   Wt 95.4 kg (210 lb 6.4 oz)   SpO2 96%   BMI 32.56 kg/m    Body mass index is 32.56 kg/m .     Physical Exam   GENERAL: healthy, alert and no distress  EYES: Eyes grossly normal to inspection and conjunctivae and sclerae normal  HENT: atraumatic, normocephalic  RESP: lungs clear to auscultation - no rales, rhonchi or wheezes  CV: regular rate and rhythm, normal S1 S2, no S3 or S4, no murmur, click or rub, no peripheral edema, no carotid bruit, no abdominal bruit  ABDOMEN: soft, nontender, no hepatosplenomegaly, no masses and bowel sounds normal  MS: no gross musculoskeletal defects noted, no edema  SKIN: no suspicious lesions or rashes  NEURO: Orientated x4,  mentation intact and speech normal  PSYCH: mentation appears normal, affect normal/bright    Results for orders placed or performed in visit on 04/18/23   Hemoglobin A1c     Status: Normal   Result Value Ref Range    Hemoglobin A1C 5.4 0.0 - 5.6 %   Comprehensive metabolic panel     Status: Abnormal   Result Value Ref Range    Sodium 140 136 - 145 mmol/L    Potassium 3.7 3.4 - 5.3 mmol/L    Chloride 100 98 - 107 mmol/L    Carbon Dioxide (CO2) 25 22 - 29 mmol/L    Anion Gap 15 7 - 15 mmol/L    Urea Nitrogen 10.6 8.0 - 23.0 mg/dL    Creatinine 0.97 0.67 - 1.17 mg/dL    Calcium 9.7 8.8 - 10.2 mg/dL    Glucose 106 (H) 70 - 99 mg/dL    Alkaline Phosphatase 81 40 - 129 U/L    AST 28 10 - 50 U/L    ALT 25 10 - 50 U/L    Protein Total 7.2 6.4 - 8.3 g/dL    Albumin 4.2 3.5 - 5.2 g/dL    Bilirubin Total 1.5 (H) <=1.2 mg/dL    GFR Estimate 86 >60 mL/min/1.73m2   Lipid panel reflex to direct LDL Non-fasting     Status: Abnormal   Result Value Ref Range "    Cholesterol 205 (H) <200 mg/dL    Triglycerides 193 (H) <150 mg/dL    Direct Measure HDL 50 >=40 mg/dL    LDL Cholesterol Calculated 116 (H) <=100 mg/dL    Non HDL Cholesterol 155 (H) <130 mg/dL    Narrative    Cholesterol  Desirable:  <200 mg/dL    Triglycerides  Normal:  Less than 150 mg/dL  Borderline High:  150-199 mg/dL  High:  200-499 mg/dL  Very High:  Greater than or equal to 500 mg/dL    Direct Measure HDL  Female:  Greater than or equal to 50 mg/dL   Male:  Greater than or equal to 40 mg/dL    LDL Cholesterol  Desirable:  <100mg/dL  Above Desirable:  100-129 mg/dL   Borderline High:  130-159 mg/dL   High:  160-189 mg/dL   Very High:  >= 190 mg/dL    Non HDL Cholesterol  Desirable:  130 mg/dL  Above Desirable:  130-159 mg/dL  Borderline High:  160-189 mg/dL  High:  190-219 mg/dL  Very High:  Greater than or equal to 220 mg/dL

## 2023-04-18 NOTE — PATIENT INSTRUCTIONS
Check if the following screening test is covered by insurance  - Abdominal Aortic Aneurysm (AAA) ultrasound -     PHarper

## 2023-04-18 NOTE — LETTER
April 23, 2023      Wilian Ness  [address]  [place] [address]        Dear ,    We are writing to inform you of your test results.  Your labs were good except for your cholesterol  It is somewhat higher than previous years. Are you still taking your cholesterol medication (atorvastatin)?  I hope your [facility] plans go smoothly. Enjoy.    Resulted Orders   Hemoglobin A1c   Result Value Ref Range    Hemoglobin A1C 5.4 0.0 - 5.6 %      Comment:      Normal <5.7%   Prediabetes 5.7-6.4%    Diabetes 6.5% or higher     Note: Adopted from ADA consensus guidelines.   Comprehensive metabolic panel   Result Value Ref Range    Sodium 140 136 - 145 mmol/L    Potassium 3.7 3.4 - 5.3 mmol/L    Chloride 100 98 - 107 mmol/L    Carbon Dioxide (CO2) 25 22 - 29 mmol/L    Anion Gap 15 7 - 15 mmol/L    Urea Nitrogen 10.6 8.0 - 23.0 mg/dL    Creatinine 0.97 0.67 - 1.17 mg/dL    Calcium 9.7 8.8 - 10.2 mg/dL    Glucose 106 (H) 70 - 99 mg/dL    Alkaline Phosphatase 81 40 - 129 U/L    AST 28 10 - 50 U/L    ALT 25 10 - 50 U/L    Protein Total 7.2 6.4 - 8.3 g/dL    Albumin 4.2 3.5 - 5.2 g/dL    Bilirubin Total 1.5 (H) <=1.2 mg/dL    GFR Estimate 86 >60 mL/min/1.73m2      Comment:      eGFR calculated using 2021 CKD-EPI equation.   Lipid panel reflex to direct LDL Non-fasting   Result Value Ref Range    Cholesterol 205 (H) <200 mg/dL    Triglycerides 193 (H) <150 mg/dL    Direct Measure HDL 50 >=40 mg/dL    LDL Cholesterol Calculated 116 (H) <=100 mg/dL    Non HDL Cholesterol 155 (H) <130 mg/dL    Narrative    Cholesterol  Desirable:  <200 mg/dL    Triglycerides  Normal:  Less than 150 mg/dL  Borderline High:  150-199 mg/dL  High:  200-499 mg/dL  Very High:  Greater than or equal to 500 mg/dL    Direct Measure HDL  Female:  Greater than or equal to 50 mg/dL   Male:  Greater than or equal to 40 mg/dL    LDL Cholesterol  Desirable:  <100mg/dL  Above Desirable:  100-129 mg/dL   Borderline High:  130-159 mg/dL   High:   160-189 mg/dL   Very High:  >= 190 mg/dL    Non HDL Cholesterol  Desirable:  130 mg/dL  Above Desirable:  130-159 mg/dL  Borderline High:  160-189 mg/dL  High:  190-219 mg/dL  Very High:  Greater than or equal to 220 mg/dL       If you have any questions or concerns, please call the clinic at the number listed above.       Sincerely,      Bran Loza MD                 no

## 2023-06-06 DIAGNOSIS — I10 ESSENTIAL HYPERTENSION WITH GOAL BLOOD PRESSURE LESS THAN 140/90: ICD-10-CM

## 2023-06-06 DIAGNOSIS — E78.5 HYPERLIPIDEMIA LDL GOAL <130: ICD-10-CM

## 2023-06-06 NOTE — TELEPHONE ENCOUNTER
"Last seen 4/18/23    Request for medication refill:  atorvastatin (LIPITOR) 40 MG tablet  lisinopril-hydrochlorothiazide (ZESTORETIC) 20-25 MG tablet  Providers if patient needs an appointment and you are willing to give a one month supply please refill for one month and  send a letter/MyChart using \".SMILLIMITEDREFILL\" .smillimited and route chart to \"P [facility] \" (Giving one month refill in non controlled medications is strongly recommended before denial)    If refill has been denied, meaning absolutely no refills without visit, please complete the smart phrase \".smirxrefuse\" and route it to the \"P [facility] MED REFILLS\"  pool to inform the patient and the pharmacy.    Yulisa Cole RN        "

## 2023-06-06 NOTE — TELEPHONE ENCOUNTER
.Verify that the refill encounter hasn't been started Yes    [facility] phone call message- patient requesting a refill:    Full Medication Name: atorvastatin (LIPITOR) 40 MG tablet +lisinprol    Dose:      Pharmacy confirmed as   [id number] - [place], [place] - [address] [place] [address] AT [facility] & [address] [place] [id number]  Phone: [phone] Fax: [phone]  : Yes    Medication tab checked to see if medication has been sent  Yes    Additional Comments: pt out of refill needs re new  Would you please advise f/u    OK to leave a message on voice mail? Yes    Advised patient refill may take up to 2 business days? Yes    Primary language: English      needed? No    Call taken on June 6, 2023 at 4:24 PM by MARY Pereyra    Route to P [facility] MED REFILL

## 2023-06-07 RX ORDER — ATORVASTATIN CALCIUM 40 MG/1
40 TABLET, FILM COATED ORAL AT BEDTIME
Qty: 90 TABLET | Refills: 3 | Status: SHIPPED | OUTPATIENT
Start: 2023-06-07 | End: 2024-05-14

## 2023-06-07 RX ORDER — LISINOPRIL AND HYDROCHLOROTHIAZIDE 20; 25 MG/1; MG/1
1 TABLET ORAL DAILY
Qty: 90 TABLET | Refills: 3 | Status: SHIPPED | OUTPATIENT
Start: 2023-06-07 | End: 2023-08-10

## 2023-08-07 ENCOUNTER — TELEPHONE (OUTPATIENT)
Dept: FAMILY MEDICINE | Facility: CLINIC | Age: 68
End: 2023-08-07
Payer: MEDICARE

## 2023-08-07 NOTE — TELEPHONE ENCOUNTER
"RN called [facility] to see why pt has not been able to  prescription. On 6/7/23 Dr. Singh sent a script for 90 tablets w/3 refills and pt reports not being able to fill. Pharmacy staff stated that is was \"stored\" and he attempted to fill and it went through. RN called pt to let them know that script went through. Pt verbalizes understanding.    Annika Buckley RN      "

## 2023-08-07 NOTE — TELEPHONE ENCOUNTER
[facility] phone call message- patient requesting a refill:    Full Medication Name: lisinopril-hydrochlorothiazide (ZESTORETIC) 20-25 MG tablet     Dose: Take 1 tablet by mouth daily - Oral     Pharmacy confirmed as   [id number] - [address] AT [facility] & [address] [place] [id number]  Phone: [phone] Fax: [phone]  : Yes    Additional Comments: [facility] isn't able to fill, despite having what seems like is multiple refills via Dr Link. Patient wasn't able to confirm issue, claims they've called and texted [facility]. Patient out of med.     OK to leave a message on voice mail? Yes    Primary language: English      needed? No    Call taken on August 7, 2023 at 4:34 PM by Doe Jordan

## 2024-05-07 DIAGNOSIS — I10 ESSENTIAL HYPERTENSION WITH GOAL BLOOD PRESSURE LESS THAN 140/90: ICD-10-CM

## 2024-05-07 RX ORDER — ATENOLOL 100 MG/1
100 TABLET ORAL DAILY
Qty: 90 TABLET | Refills: 0 | Status: SHIPPED | OUTPATIENT
Start: 2024-05-07 | End: 2024-05-17

## 2024-05-07 NOTE — LETTER
May 7, 2024      Wilian  [address]  [place] [address]          Dear Wilian,      A request for a refill on your atenolol prescription was sent to us from your pharmacy. I have notified the pharmacy to allow you one more refill.     It is time for your recheck at the clinic for your annual exam so we can monitor the medication and continue to prescribe it safely. Please make clinic appointment with me or another provider at [facility] in the next one month. This visit could be virtual or in-person.    Thank you for choosing us as your healthcare provider.      Sincerely,    Ray Machado MD

## 2024-05-07 NOTE — TELEPHONE ENCOUNTER
"Request for medication refill:    atenolol (TENORMIN) 100 MG tablet     Providers if patient needs an appointment and you are willing to give a one month supply please refill for one month and  send a letter/MyChart using \".SMILLIMITEDREFILL\" .smillimited and route chart to \"P [facility] \" (Giving one month refill in non controlled medications is strongly recommended before denial)    If refill has been denied, meaning absolutely no refills without visit, please complete the smart phrase \".smirxrefuse\" and route it to the \"P [facility] MED REFILLS\"  pool to inform the patient and the pharmacy.    Shantal Meade MA   ---------  Due for annual. Signed for one refill. Will send letter to patient.  Ray Machado MD    "

## 2024-05-14 DIAGNOSIS — E78.5 HYPERLIPIDEMIA LDL GOAL <130: ICD-10-CM

## 2024-05-14 NOTE — TELEPHONE ENCOUNTER
"Request for medication refill:  atorvastatin (LIPITOR) 40 MG tablet     Providers if patient needs an appointment and you are willing to give a one month supply please refill for one month and  send a letter/MyChart using \".SMILLIMITEDREFILL\" .smillimited and route chart to \"P [facility] \" (Giving one month refill in non controlled medications is strongly recommended before denial)    If refill has been denied, meaning absolutely no refills without visit, please complete the smart phrase \".smirxrefuse\" and route it to the \"P [facility] MED REFILLS\"  pool to inform the patient and the pharmacy.    Harleen Elliott CMA    ------  Letter sent with fill last week. Will fill one fill.  Ray Machado MD    "

## 2024-05-16 RX ORDER — ATORVASTATIN CALCIUM 40 MG/1
40 TABLET, FILM COATED ORAL AT BEDTIME
Qty: 90 TABLET | Refills: 0 | Status: SHIPPED | OUTPATIENT
Start: 2024-05-16 | End: 2024-05-17

## 2024-05-17 ENCOUNTER — OFFICE VISIT (OUTPATIENT)
Dept: FAMILY MEDICINE | Facility: CLINIC | Age: 69
End: 2024-05-17
Payer: MEDICARE

## 2024-05-17 VITALS
DIASTOLIC BLOOD PRESSURE: 81 MMHG | WEIGHT: 223 LBS | HEIGHT: 67 IN | TEMPERATURE: 98 F | OXYGEN SATURATION: 96 % | SYSTOLIC BLOOD PRESSURE: 143 MMHG | HEART RATE: 52 BPM | RESPIRATION RATE: 16 BRPM | BODY MASS INDEX: 35 KG/M2

## 2024-05-17 DIAGNOSIS — E78.5 HYPERLIPIDEMIA LDL GOAL <130: ICD-10-CM

## 2024-05-17 DIAGNOSIS — S39.012A STRAIN OF LUMBAR PARASPINOUS MUSCLE, INITIAL ENCOUNTER: ICD-10-CM

## 2024-05-17 DIAGNOSIS — Z87.891 PERSONAL HISTORY OF TOBACCO USE: ICD-10-CM

## 2024-05-17 DIAGNOSIS — I10 ESSENTIAL HYPERTENSION WITH GOAL BLOOD PRESSURE LESS THAN 140/90: Primary | ICD-10-CM

## 2024-05-17 DIAGNOSIS — R06.09 EXERTIONAL DYSPNEA: ICD-10-CM

## 2024-05-17 DIAGNOSIS — Z87.891 FORMER SMOKER: ICD-10-CM

## 2024-05-17 PROCEDURE — 80048 BASIC METABOLIC PNL TOTAL CA: CPT | Performed by: FAMILY MEDICINE

## 2024-05-17 PROCEDURE — 36415 COLL VENOUS BLD VENIPUNCTURE: CPT | Performed by: FAMILY MEDICINE

## 2024-05-17 PROCEDURE — 80061 LIPID PANEL: CPT | Performed by: FAMILY MEDICINE

## 2024-05-17 PROCEDURE — 82570 ASSAY OF URINE CREATININE: CPT | Performed by: FAMILY MEDICINE

## 2024-05-17 PROCEDURE — 99214 OFFICE O/P EST MOD 30 MIN: CPT | Performed by: FAMILY MEDICINE

## 2024-05-17 PROCEDURE — 82043 UR ALBUMIN QUANTITATIVE: CPT | Performed by: FAMILY MEDICINE

## 2024-05-17 PROCEDURE — G0296 VISIT TO DETERM LDCT ELIG: HCPCS | Performed by: FAMILY MEDICINE

## 2024-05-17 RX ORDER — ATORVASTATIN CALCIUM 40 MG/1
40 TABLET, FILM COATED ORAL AT BEDTIME
Qty: 90 TABLET | Refills: 3 | Status: SHIPPED | OUTPATIENT
Start: 2024-05-17

## 2024-05-17 RX ORDER — LISINOPRIL AND HYDROCHLOROTHIAZIDE 20; 25 MG/1; MG/1
1 TABLET ORAL DAILY
Qty: 90 TABLET | Refills: 3 | Status: SHIPPED | OUTPATIENT
Start: 2024-05-17

## 2024-05-17 RX ORDER — ACETAMINOPHEN 500 MG
500-1000 TABLET ORAL EVERY 6 HOURS PRN
Qty: 30 TABLET | Refills: 1 | Status: SHIPPED | OUTPATIENT
Start: 2024-05-17

## 2024-05-17 RX ORDER — ATENOLOL 100 MG/1
100 TABLET ORAL DAILY
Qty: 90 TABLET | Refills: 0 | Status: SHIPPED | OUTPATIENT
Start: 2024-05-17 | End: 2024-09-10

## 2024-05-17 RX ORDER — MUSCLE RUB CREAM 100; 150 MG/G; MG/G
CREAM TOPICAL
Qty: 90 G | Refills: 1 | Status: SHIPPED | OUTPATIENT
Start: 2024-05-17

## 2024-05-17 RX ORDER — AMLODIPINE BESYLATE 5 MG/1
5 TABLET ORAL DAILY
Qty: 90 TABLET | Refills: 0 | Status: SHIPPED | OUTPATIENT
Start: 2024-05-17 | End: 2024-08-15

## 2024-05-17 NOTE — PATIENT INSTRUCTIONS
Patient Education   Here is the plan from today's visit    1) For blood pressure:   We will add a third medication, amlodipine. Return in 2-4 weeks to repeat your blood pressure.     2) For back pain:  Use tylenol as needed.   I recommend physical therapy to work on the muscle spasm.     3) For breathing:   I recommend checking a lung CT to screen for lung cancer as well as pulmonary function tests.       Please call or return to clinic if your symptoms don't go away.    Follow up plan  Return in about 4 weeks (around 6/14/2024) for BP Recheck.    Thank you for coming to [facility] today.  Lab Testing:  **If you had lab testing today and your results are reassuring or normal they will be mailed to you or sent through [employer] within 7 days.   **If the lab tests need quick action we will call you with the results.  **If you are having labs done on a different day, please call [phone] to schedule at [facility] or [phone] for other [facility] Outpatient Lab locations. Labs do not offer walk-in appointments.  The phone number we will call with results is # [phone] (home) . If this is not the best number please call our clinic and change the number.  Medication Refills:  If you need any refills please call your pharmacy and they will contact us.   If you need to  your refill at a new pharmacy, please contact the new pharmacy directly. The new pharmacy will help you get your medications transferred faster.   Scheduling:  If you have any concerns about today's visit or wish to schedule another appointment please call our office during normal business hours [phone] (8-5:00 M-F). If you can no longer make a scheduled visit, please cancel via [employer] or call us to cancel.   If a referral was made to an [facility] specialty provider and you do not get a call from central scheduling, please refer to directions on your visit summary or call our office during normal business hours for  assistance.   If a Mammogram was ordered for you at the [facility] call [phone] to schedule or change your appointment.  If you had an XRay/CT/Ultrasound/MRI ordered the number is [phone] to schedule or change your radiology appointment.   [facility] has limited ultrasound appointments available on Wednesdays, if you would like your ultrasound at [facility], please call [phone] to schedule.   Medical Concerns:  If you have urgent medical concerns please call [phone] at any time of the day.    Leticia Peguero, DO      Lung Cancer Screening   Frequently Asked Questions  If you are at high-risk for lung cancer, getting screened with low-dose computed tomography (LDCT) every year can help save your life. This handout offers answers to some of the most common questions about lung cancer screening. If you have other questions, please call [phone] ([phone]).     What is it?  Lung cancer screening uses special X-ray technology to create an image of your lung tissue. The exam is quick and easy and takes less than 10 seconds. We don t give you any medicine or use any needles. You can eat before and after the exam. You don t need to change your clothes as long as the clothing on your chest doesn t contain metal. But, you do need to be able to hold your breath for at least 6 seconds during the exam.    What is the goal of lung cancer screening?  The goal of lung cancer screening is to save lives. Many times, lung cancer is not found until a person starts having physical symptoms. Lung cancer screening can help detect lung cancer in the earliest stages when it may be easier to treat.    Who should be screened for lung cancer?  We suggest lung cancer screening for anyone who is at high-risk for lung cancer. You are in the high-risk group if you:      are between the ages of 55 and 79, and    have smoked at least 1 pack of cigarettes a day for 20 or more years, and    still smoke or  have quit within the past 15 years.    However, if you have a new cough or shortness of breath, you should talk to your doctor before being screened.    Why does it matter if I have symptoms?  Certain symptoms can be a sign that you have a condition in your lungs that should be checked and treated by your doctor. These symptoms include fever, chest pain, a new or changing cough, shortness of breath that you have never felt before, coughing up blood or unexplained weight loss. Having any of these symptoms can greatly affect the results of lung cancer screening.       Should all smokers get an LDCT lung cancer screening exam?  It depends. Lung cancer screening is for a very specific group of men and women who have a history of heavy smoking over a long period of time (see  Who should be screened for lung cancer  above).  I am in the high-risk group, but have been diagnosed with cancer in the past. Is LDCT lung cancer screening right for me?  In some cases, you should not have LDCT lung screening, such as when your doctor is already following your cancer with CT scan studies. Your doctor will help you decide if LDCT lung screening is right for you.  Do I need to have a screening exam every year?  Yes. If you are in the high-risk group described earlier, you should get an LDCT lung cancer screening exam every year until you are 79, or are no longer willing or able to undergo screening and possible procedures to diagnose and treat lung cancer.  How effective is LDCT at preventing death from lung cancer?  Studies have shown that LDCT lung cancer screening can lower the risk of death from lung cancer by 20 percent in people who are at high-risk.  What are the risks?  There are some risks and limitations of LDCT lung cancer screening. We want to make sure you understand the risks and benefits, so please let us know if you have any questions. Your doctor may want to talk with you more about these risks.    Radiation  exposure: As with any exam that uses radiation, there is a very small increased risk of cancer. The amount of radiation in LDCT is small--about the same amount a person would get from a mammogram. Your doctor orders the exam when he or she feels the potential benefits outweigh the risks.    False negatives: No test is perfect, including LDCT. It is possible that you may have a medical condition, including lung cancer, that is not found during your exam. This is called a false negative result.    False positives and more testing: LDCT very often finds something in the lung that could be cancer, but in fact is not. This is called a false positive result. False positive tests often cause anxiety. To make sure these findings are not cancer, you may need to have more tests. These tests will be done only if you give us permission. Sometimes patients need a treatment that can have side effects, such as a biopsy. For more information on false positives, see  What can I expect from the results?     Findings not related to lung cancer: Your LDCT exam also takes pictures of areas of your body next to your lungs. In a very small number of cases, the CT scan will show an abnormal finding in one of these areas, such as your kidneys, adrenal glands, liver or thyroid. This finding may not be serious, but you may need more tests. Your doctor can help you decide what other tests you may need, if any.  What can I expect from the results?  About 1 out of 4 LDCT exams will find something that may need more tests. Most of the time, these findings are lung nodules. Lung nodules are very small collections of tissue in the lung. These nodules are very common, and the vast majority--more than 97 percent--are not cancer (benign). Most are normal lymph nodes or small areas of scarring from past infections.  But, if a small lung nodule is found to be cancer, the cancer can be cured more than 90 percent of the time. To know if the nodule is  cancer, we may need to get more images before your next yearly screening exam. If the nodule has suspicious features (for example, it is large, has an odd shape or grows over time), we will refer you to a specialist for further testing.  Will my doctor also get the results?  Yes. Your doctor will get a copy of your results.  Is it okay to keep smoking now that there s a cancer screening exam?  No. Tobacco is one of the strongest cancer-causing agents. It causes not only lung cancer, but other cancers and cardiovascular (heart) diseases as well. The damage caused by smoking builds over time. This means that the longer you smoke, the higher your risk of disease. While it is never too late to quit, the sooner you quit, the better.  Where can I find help to quit smoking?  The best way to prevent lung cancer is to stop smoking. If you have already quit smoking, congratulations and keep it up! For help on quitting smoking, please call [employer] at 5-271-QUITNOW (1-911.748.3486) or the American [employer] Society at 1-136.710.6003 to find local resources near you.  One-on-one health coaching:  If you d prefer to work individually with a health care provider on tobacco cessation, we offer:      Medication Therapy Management:  Our specially trained pharmacists work closely with you and your doctor to help you quit smoking.  Call [phone] or [phone] (toll free).

## 2024-05-17 NOTE — PROGRESS NOTES
"  Assessment & Plan     Essential hypertension with goal blood pressure less than 140/90  Not at goal. Refilled medications today. Add amlodipine today.   Return in 2-4 weeks for repeat blood pressure.   - atenolol (TENORMIN) 100 MG tablet  Dispense: 90 tablet; Refill: 0  - Basic metabolic panel  - lisinopril-hydrochlorothiazide (ZESTORETIC) 20-25 MG tablet  Dispense: 90 tablet; Refill: 3  - amLODIPine (NORVASC) 5 MG tablet  Dispense: 90 tablet; Refill: 0  - Albumin Random Urine Quantitative with Creat Ratio  - Albumin Random Urine Quantitative with Creat Ratio  - Basic metabolic panel    Hyperlipidemia LDL goal <130  Refill provided, will check labs today.   - Lipid panel reflex to direct LDL Non-fasting  - atorvastatin (LIPITOR) 40 MG tablet  Dispense: 90 tablet; Refill: 3  - Lipid panel reflex to direct LDL Non-fasting    Exertional dyspnea  Former smoker  Recommended PFTs to further evalutate given his tobacco use history. Will defer treatment until after PFTs.   - General PFT Lab (Please always keep checked)  - Pulmonary Function Test    Strain of lumbar paraspinous muscle, initial encounter  Recommended physical therapy, pt declined at this time. Recommended use of tylenol and muscle rub at home.   - muscle rub (ARTHRITIS HOT) 10-15 % CREA  Dispense: 90 g; Refill: 1  - acetaminophen (TYLENOL) 500 MG tablet  Dispense: 30 tablet; Refill: 1    Personal history of tobacco use  Discussed CT lung cancer screening, pt agreeable to this and order placed.   - Prof fee: Shared Decision Making for Lung Cancer Screening  - CT Chest Lung Cancer Scrn Low Dose wo      BMI  Estimated body mass index is 34.93 kg/m  as calculated from the following:    Height as of this encounter: 1.702 m (5' 7\").    Weight as of this encounter: 101.2 kg (223 lb).           Return in about 4 weeks (around 6/14/2024) for BP Recheck.    Rigo Cedeno is a 69 year old, presenting for the following health issues:  Pain (Pain in hips when going " "up stairs or exercising. Having more shortness of breath. Requesting pain medications. Dull pain, pain scale 7-8 )      5/17/2024     1:03 PM   Additional Questions   Roomed by Hollie   Accompanied by self         5/17/2024    Information    services provided? No     HPI       Hypertension Follow-up    Do you check your blood pressure regularly outside of the clinic? No   Are you following a low salt diet? Yes  Does not exercise much.   Diet: Tries not to eat a lot of french fries; doesn't add salt to foods    2) Hip pain:   H/o piriformis muscle pain, went to PT about a year and a half ago. This pain has since improved.   Wondering about sciatica   Pain worsens when climbing up stairs, especially if it is 2 flights or more of stairs.   Localized to both buttocks and into posterior thighs  Stopped doing PT exercises  Hasn't tried any treatments at home    Retired last September after being a     3) Breathing  With any strenuous activity will also start breathing hard.   Wife has an inhaler, he wonders if he needs one too.             Objective    BP (!) 143/81   Pulse 52   Temp 98  F (36.7  C) (Oral)   Resp 16   Ht 1.702 m (5' 7\")   Wt 101.2 kg (223 lb)   SpO2 96%   BMI 34.93 kg/m    Body mass index is 34.93 kg/m .  Physical Exam  Constitutional:       Appearance: He is well-developed.   HENT:      Head: Normocephalic and atraumatic.   Eyes:      Conjunctiva/sclera: Conjunctivae normal.   Cardiovascular:      Heart sounds: Normal heart sounds.   Pulmonary:      Effort: Pulmonary effort is normal.      Breath sounds: Normal breath sounds.   Musculoskeletal:         General: Normal range of motion.      Cervical back: Normal range of motion and neck supple.      Right lower leg: No edema.      Left lower leg: No edema.      Comments: Limited lumbar ROM secondary to pain with significantly hypertonic bilateral lumbar paraspinal muscles. Strength and sensation intact in BLE.  "   Skin:     General: Skin is warm and dry.   Neurological:      Mental Status: He is alert and oriented to person, place, and time.   Psychiatric:         Behavior: Behavior normal.         Thought Content: Thought content normal.         Judgment: Judgment normal.                    Signed Electronically by: Leticia Peguero, DO    Lung Cancer Screening Shared Decision Making Visit     Wilian Ness, a 69 year old male, is eligible for lung cancer screening    History   Smoking Status    Former    Types: Cigarettes   Smokeless Tobacco    Never       I have discussed with patient the risks and benefits of screening for lung cancer with low-dose CT.     The risks include:    radiation exposure: one low dose chest CT has as much ionizing radiation as about 15 chest x-rays, or 6 months of background radiation living in [place]      false positives: most findings/nodules are NOT cancer, but some might still require additional diagnostic evaluation, including biopsy    over-diagnosis: some slow growing cancers that might never have been clinically significant will be detected and treated unnecessarily     The benefit of early detection of lung cancer is contingent upon adherence to annual screening or more frequent follow up if indicated.     Furthermore, to benefit from screening, Wilian must be willing and able to undergo diagnostic procedures, if indicated. Although no specific guide is available for determining severity of comorbidities, it is reasonable to withhold screening in patients who have greater mortality risk from other diseases.     We did discuss that the best way to prevent lung cancer is to not smoke.    Some patients may value a numeric estimation of lung cancer risk when evaluating if lung cancer screening is right for them, here is one calculator:    ShouldIScreen

## 2024-05-18 LAB
ANION GAP SERPL CALCULATED.3IONS-SCNC: 9 MMOL/L (ref 7–15)
BUN SERPL-MCNC: 6.3 MG/DL (ref 8–23)
CALCIUM SERPL-MCNC: 9.6 MG/DL (ref 8.8–10.2)
CHLORIDE SERPL-SCNC: 97 MMOL/L (ref 98–107)
CHOLEST SERPL-MCNC: 192 MG/DL
CREAT SERPL-MCNC: 0.94 MG/DL (ref 0.67–1.17)
CREAT UR-MCNC: 74.1 MG/DL
DEPRECATED HCO3 PLAS-SCNC: 32 MMOL/L (ref 22–29)
EGFRCR SERPLBLD CKD-EPI 2021: 89 ML/MIN/1.73M2
FASTING STATUS PATIENT QL REPORTED: ABNORMAL
FASTING STATUS PATIENT QL REPORTED: ABNORMAL
GLUCOSE SERPL-MCNC: 116 MG/DL (ref 70–99)
HDLC SERPL-MCNC: 54 MG/DL
LDLC SERPL CALC-MCNC: 105 MG/DL
MICROALBUMIN UR-MCNC: <12 MG/L
MICROALBUMIN/CREAT UR: NORMAL MG/G{CREAT}
NONHDLC SERPL-MCNC: 138 MG/DL
POTASSIUM SERPL-SCNC: 4.4 MMOL/L (ref 3.4–5.3)
SODIUM SERPL-SCNC: 138 MMOL/L (ref 135–145)
TRIGL SERPL-MCNC: 166 MG/DL

## 2024-05-20 ENCOUNTER — OFFICE VISIT (OUTPATIENT)
Dept: PULMONOLOGY | Facility: CLINIC | Age: 69
End: 2024-05-20
Attending: FAMILY MEDICINE
Payer: MEDICARE

## 2024-05-20 DIAGNOSIS — Z87.891 FORMER SMOKER: ICD-10-CM

## 2024-05-20 DIAGNOSIS — R06.09 EXERTIONAL DYSPNEA: ICD-10-CM

## 2024-05-20 PROCEDURE — 94729 DIFFUSING CAPACITY: CPT | Performed by: INTERNAL MEDICINE

## 2024-05-20 PROCEDURE — 94060 EVALUATION OF WHEEZING: CPT | Performed by: INTERNAL MEDICINE

## 2024-05-20 PROCEDURE — 94726 PLETHYSMOGRAPHY LUNG VOLUMES: CPT | Performed by: INTERNAL MEDICINE

## 2024-05-20 NOTE — PROGRESS NOTES
Wilian Ness comes into clinic today at the request of Dr Peguero , for PFT    Tolerated testing well. No adverse reactions. Left lab in no distress.        GREY CEBALLOS

## 2024-05-21 LAB
DLCOUNC-%PRED-PRE: 103 %
DLCOUNC-PRE: 24.97 ML/MIN/MMHG
DLCOUNC-PRED: 24.13 ML/MIN/MMHG
ERV-%PRED-PRE: 33 %
ERV-PRE: 0.44 L
ERV-PRED: 1.33 L
EXPTIME-PRE: 10.62 SEC
FEF2575-%PRED-POST: 69 %
FEF2575-%PRED-PRE: 50 %
FEF2575-POST: 1.58 L/SEC
FEF2575-PRE: 1.15 L/SEC
FEF2575-PRED: 2.28 L/SEC
FEFMAX-%PRED-PRE: 72 %
FEFMAX-PRE: 5.78 L/SEC
FEFMAX-PRED: 7.97 L/SEC
FEV1-%PRED-PRE: 80 %
FEV1-PRE: 2.27 L
FEV1FEV6-PRE: 64 %
FEV1FEV6-PRED: 78 %
FEV1FVC-PRE: 61 %
FEV1FVC-PRED: 78 %
FEV1SVC-PRE: 62 %
FEV1SVC-PRED: 71 %
FIFMAX-PRE: 4.5 L/SEC
FRCPLETH-%PRED-PRE: 82 %
FRCPLETH-PRE: 2.89 L
FRCPLETH-PRED: 3.5 L
FVC-%PRED-PRE: 102 %
FVC-PRE: 3.7 L
FVC-PRED: 3.61 L
IC-%PRED-PRE: 120 %
IC-PRE: 3.22 L
IC-PRED: 2.66 L
RVPLETH-%PRED-PRE: 98 %
RVPLETH-PRE: 2.44 L
RVPLETH-PRED: 2.48 L
TLCPLETH-%PRED-PRE: 93 %
TLCPLETH-PRE: 6.1 L
TLCPLETH-PRED: 6.52 L
VA-%PRED-PRE: 91 %
VA-PRE: 5.33 L
VC-%PRED-PRE: 92 %
VC-PRE: 3.66 L
VC-PRED: 3.96 L

## 2024-05-30 ENCOUNTER — ANCILLARY PROCEDURE (OUTPATIENT)
Dept: CT IMAGING | Facility: CLINIC | Age: 69
End: 2024-05-30
Attending: FAMILY MEDICINE
Payer: MEDICARE

## 2024-05-30 DIAGNOSIS — Z87.891 PERSONAL HISTORY OF TOBACCO USE: ICD-10-CM

## 2024-05-30 PROCEDURE — 71271 CT THORAX LUNG CANCER SCR C-: CPT | Performed by: RADIOLOGY

## 2024-06-09 ENCOUNTER — HEALTH MAINTENANCE LETTER (OUTPATIENT)
Age: 69
End: 2024-06-09

## 2024-06-11 ENCOUNTER — OFFICE VISIT (OUTPATIENT)
Dept: FAMILY MEDICINE | Facility: CLINIC | Age: 69
End: 2024-06-11
Payer: MEDICARE

## 2024-06-11 VITALS
HEART RATE: 63 BPM | WEIGHT: 226 LBS | TEMPERATURE: 97.7 F | HEIGHT: 67 IN | SYSTOLIC BLOOD PRESSURE: 132 MMHG | OXYGEN SATURATION: 95 % | DIASTOLIC BLOOD PRESSURE: 72 MMHG | RESPIRATION RATE: 16 BRPM | BODY MASS INDEX: 35.47 KG/M2

## 2024-06-11 DIAGNOSIS — I10 ESSENTIAL HYPERTENSION WITH GOAL BLOOD PRESSURE LESS THAN 140/90: ICD-10-CM

## 2024-06-11 DIAGNOSIS — M54.50 LUMBAR BACK PAIN: ICD-10-CM

## 2024-06-11 DIAGNOSIS — E78.5 HYPERLIPIDEMIA LDL GOAL <130: ICD-10-CM

## 2024-06-11 DIAGNOSIS — J43.8 OTHER EMPHYSEMA (H): Primary | ICD-10-CM

## 2024-06-11 DIAGNOSIS — R06.83 SNORING: ICD-10-CM

## 2024-06-11 PROCEDURE — 99214 OFFICE O/P EST MOD 30 MIN: CPT | Performed by: FAMILY MEDICINE

## 2024-06-11 RX ORDER — RESPIRATORY SYNCYTIAL VIRUS VACCINE 120MCG/0.5
0.5 KIT INTRAMUSCULAR ONCE
Qty: 1 EACH | Refills: 0 | Status: CANCELLED | OUTPATIENT
Start: 2024-06-11 | End: 2024-06-11

## 2024-06-11 RX ORDER — ALBUTEROL SULFATE 90 UG/1
2 AEROSOL, METERED RESPIRATORY (INHALATION) EVERY 4 HOURS PRN
Qty: 18 G | Refills: 0 | Status: SHIPPED | OUTPATIENT
Start: 2024-06-11

## 2024-06-11 NOTE — PROGRESS NOTES
"  {PROVIDER CHARTING PREFERENCE:171472}  Assessment & Plan      Rigo Cedeno is a 69 year old, presenting for the following health issues:  Clinic Care Coordination - Follow-up (Breathing tests results. Lab results, blood )      6/11/2024     1:00 PM   Additional Questions   Roomed by Hollie   Accompanied by self         6/11/2024    Information    services provided? No     HPI     History of Present Illness      {Patient is 65+ yrs, please consider 4Ms documentation (Optional):919093}  {MA/LPN/RN Pre-Provider Visit Orders- hCG/UA/Strep (Optional):138112}  {SUPERLIST (Optional):151170}  {additonal problems for provider to add (Optional):883735}    {ROS Picklists (Optional):610195}    The 10-year ASCVD risk score (Nomi CURRY, et al., 2019) is: 16.8%    Values used to calculate the score:      Age: 69 years      Sex: Male      Is Non- : No      Diabetic: No      Tobacco smoker: No      Systolic Blood Pressure: 132 mmHg      Is BP treated: Yes      HDL Cholesterol: 54 mg/dL      Total Cholesterol: 192 mg/dL        Objective    /72   Pulse 63   Temp 97.7  F (36.5  C) (Oral)   Resp 16   Ht 1.702 m (5' 7\")   Wt 102.5 kg (226 lb)   SpO2 95%   BMI 35.40 kg/m    Body mass index is 35.4 kg/m .  Physical Exam   {Exam List (Optional):744757}  Physical Exam      {Diagnostic Test Results (Optional):334317}  [unfilled]        Signed Electronically by: Leticia Peguero DO  Consent was obtained from the patient to use an AI documentation tool in the creation of this note.  {Email feedback regarding this note to [email or web address]   :957212}    "

## 2024-06-11 NOTE — PROGRESS NOTES
"  Assessment & Plan     Other emphysema (H)  PFTs shows mild nonreversible obstruction. Given symptoms, prior tobacco use (has since quit) and mild increased CO2 on BMP, will diagnose with COPD today.   Gave option of trying long acting beta agonist vs. Short acting. Pt prefers to trial short acting as symptoms are infrequent. Recommended follow-up in 6-8 weeks to re-evaluate.   - albuterol (PROAIR HFA/PROVENTIL HFA/VENTOLIN HFA) 108 (90 Base) MCG/ACT inhaler  Dispense: 18 g; Refill: 0    Snoring  With worsening difficulty breathing at night. Will refer for sleep study.   - Adult Sleep Eval & Management Referral    Essential hypertension with goal blood pressure less than 140/90  At goal today. No medication adjustments.     Hyperlipidemia LDL goal <130  At goal. Reviewed results. Continue high intensity statin.     Lumbar back pain  Recommended physical therapy, pt declined at this time due to not having time to pursue this. Discussed that this is most likely to provide the longest lasting relief of his nonradicular low back pain.         BMI  Estimated body mass index is 35.4 kg/m  as calculated from the following:    Height as of this encounter: 1.702 m (5' 7\").    Weight as of this encounter: 102.5 kg (226 lb).             Return in about 4 weeks (around 7/9/2024).    Rigo Cedeno is a 69 year old, presenting for the following health issues:  Clinic Care Coordination - Follow-up (Breathing tests results. Lab results, blood )      6/11/2024     1:00 PM   Additional Questions   Roomed by Hollie   Accompanied by self         6/11/2024    Information    services provided? No     HPI                 1) Shortness of breath  Worsening over the past few months.   Notices trouble breathing through his nose when laying on his back.   Snores at night  Has not ever had a sleep study.     2) Blood pressure:   Has been taking amlodipine     3) Back Pain   Persists - feels tightness in his back, " "especially with activity.             Objective    /72   Pulse 63   Temp 97.7  F (36.5  C) (Oral)   Resp 16   Ht 1.702 m (5' 7\")   Wt 102.5 kg (226 lb)   SpO2 95%   BMI 35.40 kg/m    Body mass index is 35.4 kg/m .  Physical Exam  Constitutional:       Appearance: He is well-developed.   HENT:      Head: Normocephalic and atraumatic.   Eyes:      Conjunctiva/sclera: Conjunctivae normal.   Pulmonary:      Effort: Pulmonary effort is normal.   Musculoskeletal:         General: Normal range of motion.      Cervical back: Normal range of motion and neck supple.   Skin:     General: Skin is warm and dry.   Neurological:      Mental Status: He is alert and oriented to person, place, and time.   Psychiatric:         Behavior: Behavior normal.         Thought Content: Thought content normal.         Judgment: Judgment normal.                    Signed Electronically by: Leticia Peguero DO    "

## 2024-06-11 NOTE — PATIENT INSTRUCTIONS
Patient Education   Here is the plan from today's visit    1. Other emphysema (H)  - albuterol (PROAIR HFA/PROVENTIL HFA/VENTOLIN HFA) 108 (90 Base) MCG/ACT inhaler; Inhale 2 puffs into the lungs every 4 hours as needed for shortness of breath, wheezing or cough  Dispense: 18 g; Refill: 0    2. Snoring  - Adult Sleep Eval & Management Referral; Future    3. Essential hypertension with goal blood pressure less than 140/90  4. Hyperlipidemia LDL goal <130      Please call or return to clinic if your symptoms don't go away.    Follow up plan  Return in about 4 weeks (around 7/9/2024).    Thank you for coming to [facility] today.  Lab Testing:  **If you had lab testing today and your results are reassuring or normal they will be mailed to you or sent through SmartSynch within 7 days.   **If the lab tests need quick action we will call you with the results.  **If you are having labs done on a different day, please call [phone] to schedule at [facility] or [phone] for other [facility] locations. Labs do not offer walk-in appointments.  The phone number we will call with results is # [phone] (home) . If this is not the best number please call our clinic and change the number.  Medication Refills:  If you need any refills please call your pharmacy and they will contact us.   If you need to  your refill at a new pharmacy, please contact the new pharmacy directly. The new pharmacy will help you get your medications transferred faster.   Scheduling:  If you have any concerns about today's visit or wish to schedule another appointment please call our office during normal business hours [phone] (8-5:00 M-F). If you can no longer make a scheduled visit, please cancel via SmartSynch or call us to cancel.   If a referral was made to an [facility] specialty provider and you do not get a call from central scheduling, please refer to directions on your visit summary or call our  office during normal business hours for assistance.   If a Mammogram was ordered for you at the [facility] call [phone] to schedule or change your appointment.  If you had an XRay/CT/Ultrasound/MRI ordered the number is [phone] to schedule or change your radiology appointment.   [facility] has limited ultrasound appointments available on Wednesdays, if you would like your ultrasound at [facility], please call [phone] to schedule.   Medical Concerns:  If you have urgent medical concerns please call [phone] at any time of the day.    Leticia Peguero, DO

## 2024-08-14 DIAGNOSIS — I10 ESSENTIAL HYPERTENSION WITH GOAL BLOOD PRESSURE LESS THAN 140/90: ICD-10-CM

## 2024-08-14 NOTE — TELEPHONE ENCOUNTER
"Request for medication refill:    amLODIPine (NORVASC) 5 MG tablet     Providers if patient needs an appointment and you are willing to give a one month supply please refill for one month and  send a letter/MyChart using \".SMILLIMITEDREFILL\" .smillimited and route chart to \"P [facility] \" (Giving one month refill in non controlled medications is strongly recommended before denial)    If refill has been denied, meaning absolutely no refills without visit, please complete the smart phrase \".smirxrefuse\" and route it to the \"[facility] MED REFILLS\"  pool to inform the patient and the pharmacy.    Tram Bustillo MA      "

## 2024-08-15 RX ORDER — AMLODIPINE BESYLATE 5 MG/1
5 TABLET ORAL DAILY
Qty: 90 TABLET | Refills: 0 | Status: SHIPPED | OUTPATIENT
Start: 2024-08-15

## 2024-09-06 DIAGNOSIS — I10 ESSENTIAL HYPERTENSION WITH GOAL BLOOD PRESSURE LESS THAN 140/90: ICD-10-CM

## 2024-09-06 NOTE — TELEPHONE ENCOUNTER
"Request for medication refill:  atenolol (TENORMIN) 100 MG tablet   Providers if patient needs an appointment and you are willing to give a one month supply please refill for one month and  send a letter/MyChart using \".SMILLIMITEDREFILL\" .smillimited and route chart to \"P [facility] \" (Giving one month refill in non controlled medications is strongly recommended before denial)    If refill has been denied, meaning absolutely no refills without visit, please complete the smart phrase \".smirxrefuse\" and route it to the \"[facility] MED REFILLS\"  pool to inform the patient and the pharmacy.    Shantal Meade MA     "

## 2024-09-10 RX ORDER — ATENOLOL 100 MG/1
100 TABLET ORAL DAILY
Qty: 90 TABLET | Refills: 0 | Status: SHIPPED | OUTPATIENT
Start: 2024-09-10

## 2024-10-02 ASSESSMENT — SLEEP AND FATIGUE QUESTIONNAIRES
HOW LIKELY ARE YOU TO NOD OFF OR FALL ASLEEP WHILE SITTING INACTIVE IN A PUBLIC PLACE: WOULD NEVER DOZE
HOW LIKELY ARE YOU TO NOD OFF OR FALL ASLEEP WHILE LYING DOWN TO REST IN THE AFTERNOON WHEN CIRCUMSTANCES PERMIT: HIGH CHANCE OF DOZING
HOW LIKELY ARE YOU TO NOD OFF OR FALL ASLEEP WHILE SITTING AND TALKING TO SOMEONE: WOULD NEVER DOZE
HOW LIKELY ARE YOU TO NOD OFF OR FALL ASLEEP WHILE SITTING QUIETLY AFTER LUNCH WITHOUT ALCOHOL: WOULD NEVER DOZE
HOW LIKELY ARE YOU TO NOD OFF OR FALL ASLEEP WHILE SITTING AND READING: WOULD NEVER DOZE
HOW LIKELY ARE YOU TO NOD OFF OR FALL ASLEEP IN A CAR, WHILE STOPPED FOR A FEW MINUTES IN TRAFFIC: WOULD NEVER DOZE
HOW LIKELY ARE YOU TO NOD OFF OR FALL ASLEEP WHEN YOU ARE A PASSENGER IN A CAR FOR AN HOUR WITHOUT A BREAK: WOULD NEVER DOZE
HOW LIKELY ARE YOU TO NOD OFF OR FALL ASLEEP WHILE WATCHING TV: WOULD NEVER DOZE

## 2024-10-03 ENCOUNTER — OFFICE VISIT (OUTPATIENT)
Dept: SLEEP MEDICINE | Facility: CLINIC | Age: 69
End: 2024-10-03
Attending: FAMILY MEDICINE
Payer: MEDICARE

## 2024-10-03 VITALS
DIASTOLIC BLOOD PRESSURE: 74 MMHG | HEIGHT: 67 IN | BODY MASS INDEX: 34.33 KG/M2 | SYSTOLIC BLOOD PRESSURE: 130 MMHG | WEIGHT: 218.7 LBS | HEART RATE: 59 BPM | OXYGEN SATURATION: 98 %

## 2024-10-03 DIAGNOSIS — M35.1 OVERLAP SYNDROME (H): ICD-10-CM

## 2024-10-03 DIAGNOSIS — G47.33 OSA (OBSTRUCTIVE SLEEP APNEA): ICD-10-CM

## 2024-10-03 DIAGNOSIS — J44.89 COPD (CHRONIC OBSTRUCTIVE PULMONARY DISEASE) WITH CHRONIC BRONCHITIS (H): ICD-10-CM

## 2024-10-03 DIAGNOSIS — R06.83 SNORING: Primary | ICD-10-CM

## 2024-10-03 PROCEDURE — 99205 OFFICE O/P NEW HI 60 MIN: CPT | Mod: GC | Performed by: INTERNAL MEDICINE

## 2024-10-03 NOTE — PATIENT INSTRUCTIONS
"MY INFORMATION ON SLEEP APNEA-  Wilian Ness    DOCTOR : Emi Irwin MD  SLEEP CENTER :      MY CONTACT NUMBER:   [facility]  [phone]  [facility]   [phone]  [facility]   [phone]      [facility]  [phone]  [facility]   [phone]      [place] Points:  1. What is Obstructive Sleep Apnea (FLASH)? FLASH is the most common type of sleep apnea. Apnea literally means, \"without breath.\" It is characterized by repetitive pauses in breathing, despite continued effort to breathe, and is usually associated with a reduction in blood oxygen saturation. Apneas can last 10 to over 60 seconds. It is caused by narrowing or collapse of the upper airway as muscles relax during sleep.   2. What are the consequences of FLASH? Symptoms include: daytime sleepiness- possibly increasing the risk of falling asleep while driving, unrefreshing/restless sleep, snoring, insomnia, waking frequently to urinate, waking with heartburn or reflux, reduced concentration and memory, and morning headaches. Other health consequences may include development of high blood pressure. Untreated FLASH also can contribute to heart disease, stroke and diabetes.   3. What are the treatment options? In most situations, sleep apnea is a lifelong disease that must be managed with daily therapy. Continuous Positive Airway (CPAP) is the most reliable treatment. A mouthguard to hold your jaw forward is usually the next most reliable option. Other options include postioning devices (to keep you off your back), nasal valves, tongue retaining device, weight loss, surgery. There is more detail about these options toward the end of this document.  4. What are the most important things to remember about using CPAP?     WHERE CAN I FIND MORE INFORMATION?    American Academy of Sleep Medicine Patient information on sleep " disorders:  http://yoursleep.aasmnet.org    CPAP -  WHY AND HOW?                 Continuous positive airway pressure, or CPAP, is the most effective treatment for obstructive sleep apnea. It works by blowing room air, through a mask, to hold your throat open. A decision to use CPAP is a major step forward in the pursuit of a healthier life. The successful use of CPAP will help you breathe easier, sleep better and live healthier. Using CPAP can be a positive experience if you keep these morales points in mind:  Commitment  CPAP is not a quick fix for your problem. It involves a long-term commitment to improve your sleep and your health.    Communication  Stay in close communication with both your sleep doctor and your CPAP supplier. Ask lots of questions and seek help when you need it.    Consistency  Use CPAP all night, every night and for every nap. You will receive the maximum health benefits from CPAP when you use it every time that you sleep. This will also make it easier for your body to adjust to the treatment.    Correction  The first machine and mask that you try may not be the best ones for you. Work with your sleep doctor and your CPAP supplier to make corrections to your equipment selection. Ask about trying a different type of machine or mask if you have ongoing problems. Make sure that your mask is a good fit and learn to use your equipment properly.    Challenge  Tell a family member or close friend to ask you each morning if you used your CPAP the previous night. Have someone to challenge you to give it your best effort.    Connection   Your adjustment to CPAP will be easier if you are able to connect with others who use the same treatment. Ask your sleep doctor if there is a support group in your area for people who have sleep apnea, or look for one on the Internet.  Comfort   Increase your level of comfort by using a saline spray, decongestant or heated humidifier if CPAP irritates your nose, mouth or  "throat. Use your unit's \"ramp\" setting to slowly get used to the air pressure level. There may be soft pads you can buy that will fit over your mask straps. Look on www.CPAP.com for accessories that can help make CPAP use more comfortable.  Cleaning   Clean your mask, tubing and headgear on a regular basis. Put this time in your schedule so that you don't forget to do it. Check and replace the filters for your CPAP unit and humidifier.    Completion   Although you are never finished with CPAP therapy, you should reward yourself by celebrating the completion of your first month of treatment. Expect this first month to be your hardest period of adjustment. It will involve some trial and error as you find the machine, mask and pressure settings that are right for you.    Continuation  After your first month of treatment, continue to make a daily commitment to use your CPAP all night, every night and for every nap.    CPAP-Tips to starting with success:  Begin using your CPAP for short periods of time during the day while you watch TV or read.    Use CPAP every night and for every nap. Using it less often reduces the health benefits and makes it harder for your body to get used to it.    Newer CPAP models are virtually silent; however, if you find the sound of your CPAP machine to be bothersome, place the unit under your bed to dampen the sound.     Make small adjustments to your mask, tubing, straps and headgear until you get the right fit. Tightening the mask may actually worsen the leak.  If it leaves significant marks on your face or irritates the bridge of your nose, it may not be the best mask for you.  Speak with the person who supplied the mask and consider trying other masks. Insurances will allow you to try different masks during the first month of starting CPAP.  Insurance also covers a new mask, hose and filter about every 6 months.    Use a saline nasal spray to ease mild nasal congestion. Neti-Pot or " saline nasal rinses may also help. Nasal gel sprays can help reduce nasal dryness.  Biotene mouthwash can be helpful to protect your teeth if you experience frequent dry mouth.  Dry mouth may be a sign of air escaping out of your mouth or out of the mask in the case of a full face mask.  Speak with your provider if you expect that is the case.     Take a nasal decongestant to relieve more severe nasal or sinus congestion.  Do not use Afrin (oxymetazoline) nasal spray more than 3 days in a row.  Speak with your sleep doctor if your nasal congestion is chronic.    Use a heated humidifier that fits your CPAP model to enhance your breathing comfort. Adjust the heat setting up if you get a dry nose or throat, down if you get condensation in the hose or mask.  Position the CPAP lower than you so that any condensation in the hose drains back into the machine rather than towards the mask.    Try a system that uses nasal pillows if traditional masks give you problems.    Clean your mask, tubing and headgear once a week. Make sure the equipment dries fully.    Regularly check and replace the filters for your CPAP unit and humidifier.    Work closely with your sleep provider and your CPAP supplier to make sure that you have the machine, mask and air pressure setting that works best for you. It is better to stop using it and call your provider to solve problems than to lay awake all night frustrated with the device.    BESIDES CPAP, WHAT OTHER THERAPIES ARE THERE?  Postioning devices if you only have the snoring or apnea while on your back  Dental devices if your condition is mild  Nasal valves may be effective though experience is limited  Weight loss if you are overweight  Surgery in limited cases where devices are not acceptable or there are problems with structures in the nose and throat  If treated with one of these alternative options, further evaluation is necessary to ensure that the therapy is effective. This may  require some form of repeat testing.      Healthy Lifestyle:  Healthy diet, exercise and limit alcohol: Not only will excessive alcohol increase your weight over time, but it irritates the throat tissues and make them swell, shrinking the airway and causing snoring. Drinking alcohol should be limited and stopped within 3-4 hours before going to bed.   Stop smoking: (Red swollen throat, heat, nicotine), also irritates and swells the airway, among numerous other negative health consequences.    Positioning Device  This example shows a pillow that straps around the waist. It may be appropriate for those whose sleep study shows milder sleep apnea that occurs primarily when lying flat on one's back. Preliminary studies have shown benefit but effectiveness at home should be verified.                      Oral Appliance  These are examples of two of many custom-made devices that are more likely to work in mild sleep apnea   Oral appliances are dental mouth pieces that fit very much like a sports mouth guards or removable orthodontic retainers. They are used to treat snoring and obstructive sleep apnea . The device prevents the airway from collapsing by either supporting the jaw in a forward position. Since oral appliances are non-invasive and easy to use, they may be considered as an early treatment option. Oral appliance therapy (OAT) involves the customization, selection, fabrication, fitting, adjustments and long-term follow-up care.  Custom made oral appliances are proven to be more effective than over-the-counter devices. Therefore, the over-the-counter devices are recommended not to be used as a screening tool nor as a therapeutic option.     Who gets a dental device?  Oral appliance therapy can be used as an alternative to CPAP therapy for the treatment of mild to moderate sleep apnea and for those patients who prefer OAT to CPAP. Oral appliance therapy is a first line therapy for the treatment of primary snoring.  Additionally, OAT is an option for those that cannot tolerate CPAP as therapy or who have experienced insufficient surgical results.                 Possible side effects?  Frequent but minor side effects include: excessive salivation, dry mouth, discomfort of teeth and jaw and temporary changes in the patient s bite.  Potential complications include: jaw pain, permanent occlusal changes and TMJ symptoms.  The above mentioned side effects and complications can be recognized and managed by dentists trained in dental sleep medicine.   Finding a dentist that practices dental sleep medicine  Specific training is available through the American Academy of Dental Sleep Medicine for dentists interested in working in the field of sleep. To find a dentist who is educated in the field of sleep and the use of oral appliances, near you, visit the Web site of the American Academy of Dental Sleep Medicine; also see http://www.accpstorage.org/newOrganization/patients/oralAppliances.pdf   To search for a dentist certified in these practices:  Http://aadsm.org/FindADentist.aspx?1  Nasal Valves              Nasal valves may be an option for mild apnea if other options are not well tolerated. The efficacy of these devices is generally less than CPAP or oral appliances.They may not be effective if you have frequent nasal congestion or have difficulty breathing through your nose.   Weight Loss:    Weight loss decreases severity of sleep apnea in most people with obesity. For those with mild obesity who have developed snoring with weight gain, even 15-30 pound weight loss can improve and occasionally eliminate sleep apnea.  Structured and life-long dietary and health habits are necessary to lose weight and keep healthier weight levels.     Though there are significant health benefits from weight loss, long-term weight loss is very difficult to achieve- studies show success with dietary management in less than 10% of people. In addition,  substantial weight loss may require years of dietary control and may be difficult if patients have severe obesity. In these cases, surgical management may be considered.    If you are interested in methods for weight loss, you should review the options discussed at the National Institutes of Health patient information sites:     http://win.niddk.nih.gov/publications/index.htm  http:/www.health.nih.gov/topic/WeightLossDieting    Bariatric programs offer counseling in all methods of weight loss:    Http:/www.[email or web address].[email or web address]/Specialties/WeightLossSurgeryandMedicalMgmt/htm    Your BMI is Body mass index is 34.25 kg/m .        Body mass index (BMI) is one way to tell whether you are at a healthy weight, overweight, or obese. It measures your weight in relation to your height.  A BMI of 18.5 to 24.9 is in the healthy range. A person with a BMI of 25 to 29.9 is considered overweight, and someone with a BMI of 30 or greater is considered obese.  Another way to find out if you are at risk for health problems caused by overweight and obesity is to measure your waist. If you are a woman and your waist is more than 35 inches, or if you are a man and your waist is more than 40 inches, your risk of disease may be higher.  More than two-thirds of American adults are considered overweight or obese. Being overweight or obese increases the risk for further weight gain.  Excess weight may lead to heart disease and diabetes. Creating and following plans for healthy eating and physical activity may help you improve your health.    Methods for maintaining or losing weight.    Weight control is part of healthy lifestyle and includes exercise, emotional health, and healthy eating habits.  Careful eating habits lifelong is the mainstay of weight control.  Though there are significant health benefits from weight loss, long-term weight loss with diet alone may be very difficult to achieve- studies show long-term success with dietary  "management in less than 10% of people. Attaining a healthy weight may be especially difficult to achieve in those with severe obesity. In some cases, medications, devices and surgical management might be considered.    What can you do?    If you are overweight or obese and are interested in methods for weight loss, you should discuss this with your provider. In addition, we recommend that you review healthy life styles and methods for weight loss available through the National Institutes of Health patient information sites:     http://win.niddk.nih.gov/publications/index.htm      Surgery:  There are a number of surgeries that have been attempted to treat apnea. In general, surgical options are usually reserved for cases in which there is a physical abnormality contributing to obstruction or other treatment options are ineffective or not tolerated. Most surgical options are either unreliable or quite invasive. One of the more common procedures is:  Uvulopalatopharyngoplasty: In this procedure, the uvula (the finger-like tissue that hangs in the back of the throat), part of the soft palate (the tissue that the uvula is attached to), and sometimes the tonsils or adenoids are removed. The efficacy of this surgery is around 30-50% .  After surgery, complications may include:  Sleepiness and sleep apnea related to post-surgery medication   Swelling, infection and bleeding   A sore throat and/or difficulty swallowing   Drainage of secretions into the nose and a nasal quality to the voice. English language speech does not seem to be affected by this surgery.   Narrowing of the airway in the nose and throat (hence constricting breathing) snoring and even iatrogenically caused sleep apnea. By cutting the tissues, excess scar tissue can \"tighten\" the airway and make it even smaller than it was before UPPP.  Patients who have had the uvula removed will become unable to correctly speak [place] or any other language that has a " uvular 'r' phoneme.    Surgeries to help resolve nasal congestion may help reduce the severity of apnea slightly. Nasal congestion does not cause apnea on its own, so these surgeries are usually not performed just for FLASH.  They may be worth considering if the nasal congestion is significantly bothersome independent of apnea.

## 2024-10-03 NOTE — PROGRESS NOTES
Outpatient Sleep Medicine Consultation:      Name: Wilian Ness MRN# [id number]   Age: 69 year old YOB: 1955     Date of Consultation: October 3, 2024  Consultation is requested by: Leticia Peguero DO  [address] [place] [address] Leticia Peguero  Primary care provider: Leticia Peguero       Reason for Sleep Consult:     Wilian Ness is sent by Leticia Peguero for a sleep consultation regarding evaluation of dyspnea on exertion, as well as ongoing sleep issues including nighttime snoring and daytime fatigue.         Assessment and Plan:     69-year-old male with a medical history of hypertension, hyperlipidemia, BPH, chronic back pain, and class I obesity (BMI 34.25 kg/m ) presented for evaluation of dyspnea on exertion and ongoing sleep issues, including nighttime snoring and daytime fatigue. He has reported dyspnea with exertion for some time, which worsened during the summer months due to significant seasonal allergies and nasal congestion. Although his seasonal allergies and nasal congestion have since resolved, he continues to experience dyspnea on exertion. The patient snores at night, experiences daytime fatigue, and takes 2-hour naps each evening. He has a 35-year history of smoking : quit 12 years ago. His STOP-BANG score is 6. All these factor raised concerns for overlapping syndrome. However, a careful review of his pulmonary function PFTs revealed mild obstruction without reversibility with bronchodilators, and a chest CT scan demonstrated only mild emphysema. Therefore, the likelihood of COPD is low.     Given his high STOP-BANG score and underlying class I obesity, there still remains concern for FLASH and/or OHS that needs to be ruled out. Further evaluation for a sleep-related breathing disorder is warranted. The patient s sleep pattern suggests inadequate sleep hygiene and inconsistent bed/wake timing, along with a progressive circadian rhythm phase delay. This preference is  influenced by his detention; he enjoys watching TV at night. However, he mentions that if he needs to wake up early for any reason, he can easily adjust his sleep schedule. He does not report any issues with maintaining sleep.     Given all factors mentioned above, there is a likelihood that the patient has a sleep-related breathing disorder, necessitating further evaluation    Summary of Sleep Diagnoses    1. Concern for complicated sleep disordered breathing: High risk for moderate to severe FLASH with STOP-BANG score of 6 and/or obesity hypoventilation syndrome   Patient reports snoring during the night and experiences daytime fatigue. Given his high STOP-BANG score and underlying class I obesity, there is a concern for FLASH and/or OHS that needs to be ruled out.      2. Inadequate sleep hygiene with inconsistent bed/wake timing with progressive circadian rhythm phase delay  The patient s sleep pattern suggests inadequate sleep hygiene and inconsistent bed/wake timing, along with a progressive circadian rhythm phase delay. This preference is influenced by his detention; he enjoys watching TV at night. However, he mentions that if he needs to wake up early for any reason like for an appointment or other commitment, he can easily adjust his sleep schedule and sleep early. He does not report any issues with maintaining sleep.      Comorbid Diagnoses:  - Essential hypertension  - Hyperlipidemia  - Osteoarthritis  - Class II  Obesity BMI 34.25 kg/m2   - Former smoker    Summary Recommendations    - We will generate an order for in lab sleep test to evaluate for sleep disordered breathing.  If polysomnogram shows evidence of FLASH/OHS, we will start patient on PAP therapy and patient was agreeable with the plan.  We discussed the consequences of untreated sleep apnea/obesity hypoventilation syndrome and patient was provided with information regarding treatment options for sleep apnea.  - Results of the sleep study will be  communicated with the patient via [employer] and patient will follow-up with the sleep clinic in approximately 4 months after the home sleep study.  - Advised patient to optimize sleep hygiene  - Encouraged to follow a healthy diet and engage in regular physical activity during the day as tolerated.  - Patient was strongly advised to avoid driving, operating any heavy machinery or other hazardous situations while drowsy or sleepy.      Patient seen and discussed with Dr. Myers.     Emi Irwin MD    Sleep Fellow Physician, PGY-4   [facility]     Summary Counseling:    Sleep Testing Reviewed  Obstructive Sleep Apnea Reviewed  Complications of Untreated Sleep Apnea Reviewed    Medical Decision-making:   Educational materials provided in instructions    CC: Leticia Peguero     Physician Attestation   I, Lucia Hernandez MD, saw and discussed this patient with the Sleep Medicine Fellow.  I agree with the Fellow findings and plan of care as documented in their note which was compiled after our discussion and mutual agreement. I have personally reviewed today's vital signs, medications, laboratory results and imaging results and independently examined the patient.    Key findings:   COPD (mild emphysema) with concern for FLASH (Overlap Syndrome).  Obesity and sleep related hypoventilation and hypoxia.  Delayed Phase Type - Circadian Rhythm Disorder.  Inadequate sleep hygiene.  Total of 60 minutes of time was spent with patient, this included the interview and exam, and review of the chart/labs/imaging/sleep study/PAP therapy data on 10/03/2024 . Greater than 50% of which was spent counseling and coordinating care.   Lucia Hernandez MD  Date of Service (when I saw the patient): 10/3/24            History of Present Illness:     69-year-old male with previous medical history of hypertension, hyperlipidemia, benign BPH, chronic back pain, and class I obesity (BMI 34.25 kg/m )  presents to the office for evaluation of dyspnea on exertion, as well as ongoing sleep issues, including nighttime snoring and daytime fatigue. The patient reports that a few months ago, he experienced difficulty breathing, particularly with exertion. During that time, he also had significant seasonal allergies and nasal congestion, which made it difficult for him to breathe both during the day and at night. He utilized breathing treatments as needed; however, his nasal congestion has since significantly improved, and as of today, he is able to breathe through both nostrils. Despite this improvement, he continues to experience dyspnea with exertion.      Notably, the patient has a significant history of smoking, having smoked one pack per day for 35 years, but he quit 12 years ago. He has never been diagnosed with chronic obstructive COPD. Till last year, he worked as a , but he has since retired. He has always preferred to sleep late, even when he was working, often watching TV late into the night. He typically went to sleep between 11 PM and midnight. Since his [facility]PC, there has been no change in his sleep schedule. He mentions that, with little to do, he continues to watch TV and engage in other activities late into the night, and he often wakes up in the afternoon. The patient does note that if he has an early appointment or things to do early in the morning, he is able to go to sleep earlier. However, he prefers to maintain a late sleep schedule, as he does not have many obligations since retiring.     Details of sleep-wake schedule, sleep need, naps, sleep disruptions : breathing/movements/behavior in sleep, parasomnias, family history and CNS stimulants/CNS depressants mentioned below.     SLEEP-WAKE SCHEDULE:     Work/School Days:   Patient goes to school/work: No.  He is retired. Used to be a .   Usually gets into bed between 3 to 4 AM  Takes patient about 15 minutes to an hour  to fall asleep  Has trouble falling asleep 1 to 2 nights per week  Wakes up in the middle of the night 2 to 3 times.  Wakes up to use the bathroom. He drinks beer every evening.  He has no trouble falling back asleep   It usually takes 10 minutes to get back to sleep  Patient is usually up between 11 AM to 12 PM   Uses alarm: No.  He wakes up on his own.    Weekends/Non-work Days/All Other Days are same as weekdays.    Sleep Need  Patient gets 8 hours of sleep on average   Patient thinks he needs 8 hours of sleep to feel rested.  Wilian Ness prefers to sleep in this position(s): Either side  Patient states they do the following activities in bed: Denies using phone, tablet, computer and watch TV in bed    Naps  Patient takes a purposeful nap at 5 PM everyday and naps are usually 2 hours in duration.  He feels better after a nap: Yes  He denies dozing off unintentionally   Patient has had a driving accident or near-miss due to sleepiness/drowsiness: No      SLEEP DISRUPTIONS:    Breathing/Snoring  Patient snores: Yes  Other people complain about his snoring: Yes  Patient has been told he stops breathing in his sleep: No  He has issues with the following: Endorses stuffy nose when he wakes up sometimes as well as using bathroom multiple times during the night.     Movement:   Patient gets pain, discomfort, with an urge to move: No  It happens when he is resting:  No  It happens more at night:  No  Patient has been told he kicks his legs at night:   No     Behaviors in Sleep:  Wilian Ness has experienced the following behaviors while sleeping: Denies night terrors, nightmares, sleep walking/talking, dream enactment, hallucinations or any other parasomnias.   He has experienced sudden muscle weakness during the day: No    CAFFEINE AND OTHER SUBSTANCES:  Patient consumes caffeinated beverages per day: 1 to 2 pop soda.  Last caffeine use is usually: Around 3 PM  List of any prescribed or over the counter stimulants  that patient takes: No  List of any prescribed or over the counter sleep medication patient takes: No  List of previous sleep medications that patient has tried: No  Patient drinks alcohol to help them sleep: Yes.  Patient drinks alcohol near bedtime: Yes.    Family History:  Patient has a family member been diagnosed with a sleep disorder: No     Past Sleep Evaluations : None    SCALES:    EPWORTH SLEEPINESS SCALE         10/2/2024     3:37 PM    [place] Sleepiness Scale ( ELIZA Magana  [id number]<br>ESS - [place]/English - Final version - 21 Nov 07 - [facility] [employer] [place].)   Sitting and reading Would never doze   Watching TV Would never doze   Sitting, inactive in a public place (e.g. a theatre or a meeting) Would never doze   As a passenger in a car for an hour without a break Would never doze   Lying down to rest in the afternoon when circumstances permit High chance of dozing   Sitting and talking to someone Would never doze   Sitting quietly after a lunch without alcohol Would never doze   In a car, while stopped for a few minutes in traffic Would never doze   [place] Score (MC) 3   [place] Score (Sleep) 3         INSOMNIA SEVERITY INDEX (LISA)          10/2/2024     3:05 PM   Insomnia Severity Index (LISA)   Difficulty falling asleep 0   Difficulty staying asleep 1   Problems waking up too early 0   How SATISFIED/DISSATISFIED are you with your CURRENT sleep pattern? 2   How NOTICEABLE to others do you think your sleep problem is in terms of impairing the quality of your life? 0   How WORRIED/DISTRESSED are you about your current sleep problem? 1   To what extent do you consider your sleep problem to INTERFERE with your daily functioning (e.g. daytime fatigue, mood, ability to function at work/daily chores, concentration, memory, mood, etc.) CURRENTLY? 0   LISA Total Score 4       Guidelines for Scoring/Interpretation:  Total score categories:  0-7 = No clinically significant insomnia   8-14 = Subthreshold insomnia    15-21 = Clinical insomnia (moderate severity)  22-28 = Clinical insomnia (severe)  Used via courtesy of www.myhealth.va.gov with permission from Te Alegre PhD., [facility]      STOP BANG         10/3/2024     1:25 PM   STOP BANG Questionnaire (  2008, the American Society of Anesthesiologists, Inc. [employer]   1. Snoring - Do you snore loudly (louder than talking or loud enough to be heard through closed doors)? No   2. Tired - Do you often feel tired, fatigued, or sleepy during daytime? Yes   3. Observed - Has anyone observed you stop breathing during your sleep? No   4. Blood pressure - Do you have or are you being treated for high blood pressure? Yes   5. BMI - BMI more than 35 kg/m2? No   6. Age - Age over 50 yr old? Yes   7. Neck circumference - Neck circumference greater than 40 cm? No   8. Gender - Gender male? Yes   STOP BANG Score (MC): 3 (High risk of FLASH)     Allergies:    Allergies   Allergen Reactions    Seasonal Allergies      Ragweed, in the fall       Medications:    Current Outpatient Medications   Medication Sig Dispense Refill    acetaminophen (TYLENOL) 500 MG tablet Take 1-2 tablets (500-1,000 mg) by mouth every 6 hours as needed for mild pain 30 tablet 1    albuterol (PROAIR HFA/PROVENTIL HFA/VENTOLIN HFA) 108 (90 Base) MCG/ACT inhaler Inhale 2 puffs into the lungs every 4 hours as needed for shortness of breath, wheezing or cough 18 g 0    amLODIPine (NORVASC) 5 MG tablet TAKE 1 TABLET(5 MG) BY MOUTH DAILY 90 tablet 0    atenolol (TENORMIN) 100 MG tablet TAKE 1 TABLET(100 MG) BY MOUTH DAILY 90 tablet 0    atorvastatin (LIPITOR) 40 MG tablet Take 1 tablet (40 mg) by mouth at bedtime 90 tablet 3    lisinopril-hydrochlorothiazide (ZESTORETIC) 20-25 MG tablet Take 1 tablet by mouth daily 90 tablet 3    muscle rub (ARTHRITIS HOT) 10-15 % CREA Use topically up to 6 times a day as needed for pain relief. 90 g 1       Problem List:  Patient Active Problem List     Diagnosis Date Noted    Tubular adenoma of colon 2012     Priority: High     positive iFIT test 2011;   colonoscopy 8.26.12 - 3 tubular adenomas ( 2 Ascending colon - 7mm, 14mm) (1 Transverse colon - 4 mm).   Colonoscopy 13 - 12mm polyp. - repeat 1 year  Colonoscopy 9/18/15 - 10mm polyp - repeat 2 years  Declines colonoscopy 10/2018 - FIT negative  Colonoscopy 2020 - no polyps - repeat 5-10 years      Former smoker 2022     Priority: Medium    Benign prostatic hyperplasia with nocturia 10/10/2018     Priority: Medium    Neck mass 2015     Priority: Medium     Likely lipoma on R neck      Health care home, active care coordination      Priority: Medium     Tier 1      Essential hypertension with goal blood pressure less than 140/90      Priority: Medium    Hyperlipidemia LDL goal <130      Priority: Medium        Past Medical/Surgical History:  No past medical history on file.  No past surgical history on file.    Social History:  Social History     Socioeconomic History    Marital status: Single     Spouse name: Not on file    Number of children: Not on file    Years of education: Not on file    Highest education level: Not on file   Occupational History    Occupation: tree crew     Employer: [employer]   Tobacco Use    Smoking status: Former     Current packs/day: 0.00     Average packs/day: 2.0 packs/day for 35.0 years (70.0 ttl pk-yrs)     Types: Cigarettes     Start date:      Quit date:      Years since quittin.7     Passive exposure: Past    Smokeless tobacco: Never   Vaping Use    Vaping status: Never Used   Substance and Sexual Activity    Alcohol use: Yes     Comment: daily    Drug use: Not on file    Sexual activity: Not on file   Other Topics Concern    Parent/sibling w/ CABG, MI or angioplasty before 65F 55M? Not Asked   Social History Narrative    Not on file     Social Determinants of Health     Financial Resource Strain: Not on file   Food Insecurity: Not on  "file   Transportation Needs: Not on file   Physical Activity: Not on file   Stress: Not on file   Social Connections: Not on file   Interpersonal Safety: Low Risk  (5/17/2024)    Interpersonal Safety     Do you feel physically and emotionally safe where you currently live?: Yes     Within the past 12 months, have you been hit, slapped, kicked or otherwise physically hurt by someone?: No     Within the past 12 months, have you been humiliated or emotionally abused in other ways by your partner or ex-partner?: No   Housing Stability: Not on file       Family History:  Family History   Problem Relation Age of Onset    Cardiovascular Father 81        Pulmonary embolus    Cancer - colorectal No family hx of     Prostate Cancer No family hx of        Review of Systems  A complete review of systems reviewed by me is negative with the exeption of what has been mentioned in the history of present illness.    Physical Examination  Vitals: /74   Pulse 59   Ht 1.702 m (5' 7.01\")   Wt 99.2 kg (218 lb 11.2 oz)   SpO2 98%   BMI 34.25 kg/m    BMI= Body mass index is 34.25 kg/m .    Neck Cir (cm): 46 cm    Short Physical Exam   General: Alert, Active and in no acute distress.   HEENT: NCAT, PERRLA, oral cavity is moist with no swelling, lesions of the buccal mucosa.Uvula is midline and symmetrical.    Respiratory: No rhonchi, wheeze or retractions heard     CVS: S1/S2, no murmur or gallop heard    Psychiatry: Cooperative   Neurological: No focal deficits noted  Mallampati Class: III.  Tonsillar Stage: 1  hidden by pillars.         Data: All pertinent previous laboratory data reviewed     Recent Labs   Lab Test 05/17/24  1353 04/18/23  1029    140   POTASSIUM 4.4 3.7   CHLORIDE 97* 100   CO2 32* 25   ANIONGAP 9 15   * 106*   BUN 6.3* 10.6   CR 0.94 0.97   BERNARDO 9.6 9.7       No results for input(s): \"WBC\", \"RBC\", \"HGB\", \"HCT\", \"MCV\", \"MCH\", \"MCHC\", \"RDW\", \"PLT\" in the last 37240 hours.    Recent Labs   Lab Test " "23  1029   PROTTOTAL 7.2   ALBUMIN 4.2   BILITOTAL 1.5*   ALKPHOS 81   AST 28   ALT 25       No results found for: \"TSH\"    No results found for: \"UAMP\", \"UBARB\", \"BENZODIAZEUR\", \"UCANN\", \"UCOC\", \"OPIT\", \"UPCP\"    No results found for: \"IRONSAT\", \"BM98070\", \"IRVING\"    No results found for: \"PH\", \"PHARTERIAL\", \"PO2\", \"GX7VQHVTIBS\", \"SAT\", \"PCO2\", \"HCO3\", \"BASEEXCESS\", \"LATESHA\", \"BEB\"    @LABRCNTIPR(phv:4,pco2v:4,po2v:4,hco3v:4,amirah:4,o2per:4)@    Echocardiology: No results found for this or any previous visit (from the past 4320 hour(s)).    Chest x-ray:   No results found for this or any previous visit from the past 365 days.      Chest CT:   CT Chest Lung Cancer Scrn Low Dose wo 2024    Narrative  CT Low Dose Lung Cancer Screening    History:  Lung cancer screening; No chest CT for lung cancer screening  in the last year; 50-80 years; >= 20 pack-year smoking history; Former  smoker; Smoking quit date within the last 15 years; Personal history  of tobacco use Screening for lung cancer, smoking.    Number of packs-year of smokin  Current or former smoker?: Former  If former, number of years since quit?: 13    Comparison: None available    Technique: Helical acquisition low dose CT chest. Images reviewed in  lung, soft tissue and bone windows.  DLP: 130 (mGy*cm)  CTDIvol: 3.28 (mGy)    Findings:  Nodules: 4 mm noncalcified nodule in the left lower lobe (series 4  image 216). 2 mm nodule in the medial left upper lobe (series 4 image  147).    Emphysema: Minimal apical predominant centrilobular and paraseptal  emphysema.    Coronary artery calcium: Moderate coronary artery: Secretions.    Additional findings:  No pleural effusion or pneumothorax. No focal consolidation or  groundglass opacity. Minimal bronchial wall thickening. No significant  bronchiectasis.    Normal heart size. No pericardial effusion. Normal caliber ascending  aorta and main pulmonary artery. No thoracic lymphadenopathy.    Limited " "evaluation of the upper abdomen.    No acute or aggressive osseous abnormality.    Impression  Impression:  1. ACR Assessment Category (2022):  Lung-RADS Category 2. Benign  appearance or behavior.    Recommendation:  Lung-RADS Category 2. Benign appearance or behavior.  Recommendation:  continue annual screening with Lung cancer screening  CT (please order exam code [id number]).    2. Significant Incidental Finding(s):  Category S: No.  a.  coronary artery calcium moderate or severe    3. Any moderate or severe Emphysema or bronchial wall thickening or  mosaic attenuation? No.    4. Avoidance of tobacco smoke is strongly advised. Please consider  referral for smoking cessation to [facility] Medication Therapy Management  (MTM) if clinically appropriate.      Download the \"LungRADS 2022 Assessment Categories\" table at this site:    [email or web address]      ABNER SOMMERS DO      SYSTEM ID:  [id number]      PFT: Most Recent [place] Pulmonary Function Testing    FVC-Pred   Date Value Ref Range Status   05/20/2024 3.61 L      FVC-Pre   Date Value Ref Range Status   05/20/2024 3.70 L      FVC-%Pred-Pre   Date Value Ref Range Status   05/20/2024 102 %      FEV1-Pre   Date Value Ref Range Status   05/20/2024 2.27 L      FEV1-%Pred-Pre   Date Value Ref Range Status   05/20/2024 80 %      FEV1FVC-Pred   Date Value Ref Range Status   05/20/2024 78 %      FEV1FVC-Pre   Date Value Ref Range Status   05/20/2024 61 %      No results found for: \"20029\"  FEFMax-Pred   Date Value Ref Range Status   05/20/2024 7.97 L/sec      FEFMax-Pre   Date Value Ref Range Status   05/20/2024 5.78 L/sec      FEFMax-%Pred-Pre   Date Value Ref Range Status   05/20/2024 72 %      ExpTime-Pre   Date Value Ref Range Status   05/20/2024 10.62 sec      FIFMax-Pre   Date Value Ref Range Status   05/20/2024 4.50 L/sec      FEV1FEV6-Pred   Date Value Ref Range Status   05/20/2024 78 %      FEV1FEV6-Pre   Date Value " "Ref Range Status   05/20/2024 64 %      No results found for: \"20055\"      Emi Irwin MD 10/3/2024     Total time spent reviewing medical records, history and physical examination, review of previous testing and interpretation as well as documentation on this date: 60 minutes.    "

## 2024-11-14 ENCOUNTER — TELEPHONE (OUTPATIENT)
Dept: FAMILY MEDICINE | Facility: CLINIC | Age: 69
End: 2024-11-14
Payer: MEDICARE

## 2024-11-14 DIAGNOSIS — I10 ESSENTIAL HYPERTENSION WITH GOAL BLOOD PRESSURE LESS THAN 140/90: ICD-10-CM

## 2024-11-14 RX ORDER — AMLODIPINE BESYLATE 5 MG/1
5 TABLET ORAL DAILY
Qty: 90 TABLET | Refills: 0 | Status: SHIPPED | OUTPATIENT
Start: 2024-11-14

## 2024-11-14 NOTE — TELEPHONE ENCOUNTER
Verify that the refill encounter hasn't been started Yes    [facility] phone call message- patient requesting a refill:    Full Medication Name: amLODIPine (NORVASC) 5 MG table     Dose:      Pharmacy confirmed as   [id number] - [address] AT [address] [address]  Phone: [phone] Fax: [phone]  : Yes    Medication tab checked to see if medication has been sent  Yes    Additional Comments: patient out of meds requesting  refill    OK to leave a message on voice mail? Yes    Advised patient refill may take up to 2 business days? Yes    Primary language: English      needed? No    Call taken on November 14, 2024 at 4:02 PM by MARY Pereyra    Route to P [facility] MED REFILL

## 2024-11-14 NOTE — TELEPHONE ENCOUNTER
Patient last seen 6/11/24. HTN was at goal with no medication adjustments. One time fill of amlodipine sent per RN standing order.   Arina Orozco RN

## 2024-12-02 DIAGNOSIS — J43.8 OTHER EMPHYSEMA (H): ICD-10-CM

## 2024-12-02 NOTE — TELEPHONE ENCOUNTER

## 2024-12-03 RX ORDER — ALBUTEROL SULFATE 90 UG/1
INHALANT RESPIRATORY (INHALATION)
Qty: 18 G | Refills: 0 | Status: SHIPPED | OUTPATIENT
Start: 2024-12-03

## 2024-12-27 ENCOUNTER — OFFICE VISIT (OUTPATIENT)
Dept: FAMILY MEDICINE | Facility: CLINIC | Age: 69
End: 2024-12-27
Payer: MEDICARE

## 2024-12-27 VITALS
SYSTOLIC BLOOD PRESSURE: 137 MMHG | DIASTOLIC BLOOD PRESSURE: 61 MMHG | OXYGEN SATURATION: 96 % | BODY MASS INDEX: 34.37 KG/M2 | HEART RATE: 61 BPM | TEMPERATURE: 98.1 F | RESPIRATION RATE: 16 BRPM | HEIGHT: 67 IN | WEIGHT: 219 LBS

## 2024-12-27 DIAGNOSIS — J43.8 OTHER EMPHYSEMA (H): ICD-10-CM

## 2024-12-27 DIAGNOSIS — I25.10 CORONARY ARTERY DISEASE DUE TO CALCIFIED CORONARY LESION: ICD-10-CM

## 2024-12-27 DIAGNOSIS — I25.84 CORONARY ARTERY DISEASE DUE TO CALCIFIED CORONARY LESION: ICD-10-CM

## 2024-12-27 DIAGNOSIS — Z87.891 PERSONAL HISTORY OF TOBACCO USE: ICD-10-CM

## 2024-12-27 DIAGNOSIS — Z29.11 NEED FOR VACCINATION AGAINST RESPIRATORY SYNCYTIAL VIRUS: ICD-10-CM

## 2024-12-27 DIAGNOSIS — R68.84 JAW PAIN: ICD-10-CM

## 2024-12-27 DIAGNOSIS — N18.2 CKD (CHRONIC KIDNEY DISEASE) STAGE 2, GFR 60-89 ML/MIN: ICD-10-CM

## 2024-12-27 DIAGNOSIS — Z23 ENCOUNTER FOR IMMUNIZATION: ICD-10-CM

## 2024-12-27 DIAGNOSIS — Z00.00 PREVENTATIVE HEALTH CARE: ICD-10-CM

## 2024-12-27 DIAGNOSIS — N63.42 SUBAREOLAR MASS OF LEFT BREAST: Primary | ICD-10-CM

## 2024-12-27 DIAGNOSIS — I10 ESSENTIAL HYPERTENSION WITH GOAL BLOOD PRESSURE LESS THAN 130/80: ICD-10-CM

## 2024-12-27 RX ORDER — ATENOLOL 100 MG/1
100 TABLET ORAL DAILY
Qty: 90 TABLET | Refills: 1 | Status: SHIPPED | OUTPATIENT
Start: 2024-12-27

## 2024-12-27 RX ORDER — AMLODIPINE BESYLATE 5 MG/1
5 TABLET ORAL DAILY
Qty: 90 TABLET | Refills: 1 | Status: SHIPPED | OUTPATIENT
Start: 2024-12-27

## 2024-12-27 RX ORDER — ALBUTEROL SULFATE 90 UG/1
INHALANT RESPIRATORY (INHALATION)
Qty: 18 G | Refills: 0 | Status: CANCELLED | OUTPATIENT
Start: 2024-12-27

## 2024-12-27 RX ORDER — ASPIRIN 81 MG/1
81 TABLET ORAL DAILY
Qty: 90 TABLET | Refills: 1 | Status: SHIPPED | OUTPATIENT
Start: 2024-12-27

## 2024-12-27 NOTE — PROGRESS NOTES
Preceptor Attestation:   Patient seen, evaluated and discussed with the resident. I have verified the content of the note, which accurately reflects my assessment of the patient and the plan of care.   Supervising Physician:  Gurdeep Wilkins MD

## 2024-12-27 NOTE — PATIENT INSTRUCTIONS
Patient Education   Here is the plan from today's visit    1. Essential hypertension with goal blood pressure less than 140/90  Refill  - amLODIPine (NORVASC) 5 MG tablet; Take 1 tablet (5 mg) by mouth daily.  Dispense: 90 tablet; Refill: 1  - atenolol (TENORMIN) 100 MG tablet; Take 1 tablet (100 mg) by mouth daily.  Dispense: 90 tablet; Refill: 1    3. Jaw pain (Primary)  I want you to see your dentist. You have pretty extensive cavities in your left lower jaw line.     4. Subareolar mass of left breast  I want to get an image of your breast. It could be a benign tumor whether fat, fibers or a cyst, but there is a risk of cancer although the good news is there is no obvious signs of cancer except this unexplained mass. This imaging is the first step. Given the results, we may need to do a needle biopsy or removal outright for determining what type of growth it is.   - US Breast Left Limited 1-3 Quadrants; Future    5. Preventative health care  2. Need for vaccination against respiratory syncytial virus  Please see your pharmacy for the RSV shot. You should do exercise that increases your heart rate comfortably and do this for 30 mins x 5 days a week, and 1-2 hour combined weight lifting.   - INFLUENZA HIGH DOSE, TRIVALENT, PF (FLUZONE)  - RSV vaccine, bivalent, ABRYSVO, injection; Inject 0.5 mLs into the muscle once for 1 dose. Pharmacist administered  Dispense: 0.5 mL; Refill: 0  -  Aorta Medicare AAA Screening; Future    6. Personal history of tobacco use  We will check your aorta for possible tobacco changes that could be life threatening if not monitored.   -  Aorta Medicare AAA Screening; Future    7. Encounter for immunization  Thank you for vaccinating  - INFLUENZA HIGH DOSE, TRIVALENT, PF (FLUZONE)    8. Coronary artery disease due to calcified coronary lesion  Please take this  low dose aspirin medication once per day. This aspirin is designed to prevent a heart attack. If you have abdominal pain,  bloody or black stools, please stop taking this medication and see us in clinic or go to hospital if you have severe symptoms. Coronary artery disease can cause heart attack symptoms that are like chest pain, shortness of breath, dizziness, passing out, extreme sweatiness and nausea and feeling impending doom. You should take a 325 mg aspirin right away and call the ambulance. Please see the hand out about mediterranean diet and exercise regularly to reduce your risk.   - aspirin 81 MG EC tablet; Take 1 tablet (81 mg) by mouth daily.  Dispense: 90 tablet; Refill: 1    9. Other emphysema (H)   I think you have COPD. Please document your breathing symptoms,, triggers to worsening breathing and how often you are using your inhaler. You should bring this information to a follow up visit just for emphysema.     Please call or return to clinic if your symptoms don't go away.    Follow up plan  Return in about 3 weeks (around 1/17/2025), or if symptoms worsen or fail to improve, for Follow up after promptly getting the Ultrasounds. .    Thank you for coming to [facility] today.  Lab Testing:  **If you had lab testing today and your results are reassuring or normal they will be mailed to you or sent through [employer] within 7 days.   **If the lab tests need quick action we will call you with the results.  **If you are having labs done on a different day, please call [phone] to schedule at [facility] or [phone] for other [facility] locations. Labs do not offer walk-in appointments.  The phone number we will call with results is # [phone] (home) . If this is not the best number please call our clinic and change the number.  Medication Refills:  If you need any refills please call your pharmacy and they will contact us.   If you need to  your refill at a new pharmacy, please contact the new pharmacy directly. The new pharmacy will help you get your medications transferred faster.    Scheduling:  If you have any concerns about today's visit or wish to schedule another appointment please call our office during normal business hours [phone] (8-5:00 M-F). If you can no longer make a scheduled visit, please cancel via [email or web address] or call us to cancel.   If a referral was made to an [facility] specialty provider and you do not get a call from central scheduling, please refer to directions on your visit summary or call our office during normal business hours for assistance.   If a Mammogram was ordered for you at the Breast Center call [phone] to schedule or change your appointment.  If you had an XRay/CT/Ultrasound/MRI ordered the number is [phone] to schedule or change your radiology appointment.   [facility] has limited ultrasound appointments available on Wednesdays, if you would like your ultrasound at [facility], please call [phone] to schedule.   Medical Concerns:  If you have urgent medical concerns please call [phone] at any time of the day.    Christiano Echols MD

## 2024-12-27 NOTE — PROGRESS NOTES
Assessment & Plan     Preventative health care  Encounter for immunization  Need for vaccination against respiratory syncytial virus  First time meeting Wilian today. We focused on diet and lifestyle modifications that can improve his cardiovascular health and reduce risk of complications with suspected underlying COPD.   - INFLUENZA HIGH DOSE, TRIVALENT, PF (FLUZONE)  - INFLUENZA HIGH DOSE, TRIVALENT, PF (FLUZONE)  - RSV vaccine, bivalent, ABRYSVO, injection; Inject 0.5 mLs into the muscle once for 1 dose. Pharmacist administered  -  Aorta Medicare AAA Screening; Future    Essential hypertension with goal blood pressure less than 140/90  Personal history of tobacco use  Other emphysema (H)  Coronary artery disease due to calcified coronary lesion  CKD (chronic kidney disease) stage 2, GFR 60-89 ml/min   The patient has a concerning potentially 60+ pack year history with radiographic evidence of end organ damage and vascular disease. Precontemplative about tobacco cessation. CT 5/2024 shows radiographic radiographic evidence of moderate to severe coronary artery calcium score and moderate or severe Emphysema or bronchial wall thickening. Lung-RADS Category 2, needs annual CT lung screen and AAA which has not been previously been done despite being offered so importance was emphasized. Needs additional secondary prevention with ASA 81 mg (beyond 40 mg Lipitor) with review of risks and benefits. Counseled about  mg and recognizing ACS symptoms. Exam notable for wheezes today and no hypoxia. Most likely has COPD as treating already with albuterol, and recommended follow up to discuss better treatments with journal of triggers and symptom frequency. Refilling BP medications today (on lisinopril-hydrochlorothiazide 20-25mg; 5/2024 BMP normal lytes, year trend GFRs high 80s). BP goal <120-125/<80 which can be titrated at follow up.   - aspirin 81 MG EC tablet; Take 1 tablet (81 mg) by mouth daily.  - US Aorta  "Medicare AAA Screening; Future  - amLODIPine (NORVASC) 5 MG tablet; Take 1 tablet (5 mg) by mouth daily.  - atenolol (TENORMIN) 100 MG tablet; Take 1 tablet (100 mg) by mouth daily.    Subareolar mass of left breast  Area of left breast with firm-soft round lump appreciated visually under the skin. Unclear etiology, ddx lipoma vs fibroma vs cyst, less likely gynecomastia vs unlikely malignancy w/o B symptoms and 7 lb weight gain over 3 years, so will need ultrasound to further characterize and consider need for fine needle aspirate.   - US Breast Left Limited 1-3 Quadrants; Future    Jaw pain  Most likely related to dental issues and not TMJ with unimpressive jaw exam. Recommended prompt follow up with dentist due to severe (non abscessing appearing) dental carries on left lower side.     BMI  Estimated body mass index is 34.3 kg/m  as calculated from the following:    Height as of this encounter: 1.702 m (5' 7\").    Weight as of this encounter: 99.3 kg (219 lb).       Return in about 3 weeks (around 1/17/2025), or if symptoms worsen or fail to improve, for Follow up after promptly getting the Ultrasounds. .    Rigo Cedeno is a 69 year old, presenting for the following health issues:  History of Present Illness (Lump on left breast)      12/27/2024     8:16 AM   Additional Questions   Roomed by Shantal         12/27/2024    Information    services provided? No     HPI     Wilian is following up today for a left breast lump.   Has noticed this over the past 2 months.   First it was hard to tell but with rechecks and comparing breast he noticed some asymmetry  Developed nipple sensitivity of sharp dull pain on left side about 2 months ago.   Not noticing anything else.   No breast discharge or rash.   Family history -- no breast cancer. No other cancers.   No personal history of other cancer.   No reduced libido.     Separate issue:   -Issue in left jaw near TMJ joint, has pain with chewing. " "  -Not sure when started. No clicking. No headaches. No swelling or tenderness. Has bad teeth.     Smoking history - 20 - 56 with peak of 2 packs per day.     Review of Systems  Constitutional, HEENT, cardiovascular, pulmonary, gi and gu systems are negative, except as otherwise noted.      Objective    /61 (BP Location: Left arm, Patient Position: Sitting, Cuff Size: Adult Large)   Pulse 61   Temp 98.1  F (36.7  C) (Oral)   Resp 16   Ht 1.702 m (5' 7\")   Wt 99.3 kg (219 lb)   SpO2 96%   BMI 34.30 kg/m    Body mass index is 34.3 kg/m .  Physical Exam   GENERAL: alert, well appearing, appearance consistent with age, and no distress  EYES: Conjunctivae and sclerae normal  HENT: NCAT, MMM, poor dentition with advanced cavities on left lower jaw line, no gum swelling or asymmetry, normal tonsils, normal TMJ without clicking or tenderness  NECK: normal thyroid, no asymmetry, masses, or scars  RESP: symmetric mild wheezes on auscultation - no rales, rhonchi. Non labored breathing.   BREAST: left breast with subareolar 2\" obvious mass, soft to firm, non fluctuant, no tenderness or nipple discharge and no palpable axillary masses or adenopathy  CV: regular rate and rhythm, normal S1 S2, no murmur, no peripheral edema  ABDOMEN: soft, nontender, no hepatosplenomegaly, no masses and bowel sounds normal  MS: no gross musculoskeletal defects noted  SKIN: no suspicious lesions or rashes  NEURO: Normal strength and tone, mentation intact and speech normal  PSYCH: mentation appears normal, affect normal/bright  LYMPH: no cervical or clavicular adenopathy    No results found for any visits on 12/27/24.        Signed Electronically by: Christiano Echols MD    "

## 2025-01-08 ENCOUNTER — HOSPITAL ENCOUNTER (OUTPATIENT)
Dept: MAMMOGRAPHY | Facility: CLINIC | Age: 70
Discharge: HOME | End: 2025-01-08
Attending: FAMILY MEDICINE
Payer: MEDICARE

## 2025-01-08 ENCOUNTER — HOSPITAL ENCOUNTER (OUTPATIENT)
Dept: MAMMOGRAPHY | Facility: CLINIC | Age: 70
Discharge: HOME | End: 2025-01-08
Payer: MEDICARE

## 2025-01-08 DIAGNOSIS — N63.42 SUBAREOLAR MASS OF LEFT BREAST: ICD-10-CM

## 2025-01-08 PROCEDURE — 76642 ULTRASOUND BREAST LIMITED: CPT | Mod: LT

## 2025-01-08 PROCEDURE — 77066 DX MAMMO INCL CAD BI: CPT

## 2025-02-12 ENCOUNTER — HOSPITAL ENCOUNTER (OUTPATIENT)
Dept: RESPIRATORY THERAPY | Facility: CLINIC | Age: 70
Discharge: HOME | End: 2025-02-12
Payer: MEDICARE

## 2025-02-12 DIAGNOSIS — J44.89 COPD (CHRONIC OBSTRUCTIVE PULMONARY DISEASE) WITH CHRONIC BRONCHITIS (H): ICD-10-CM

## 2025-02-12 DIAGNOSIS — R06.83 SNORING: ICD-10-CM

## 2025-02-12 DIAGNOSIS — M35.1 OVERLAP SYNDROME: ICD-10-CM

## 2025-02-12 DIAGNOSIS — G47.33 OSA (OBSTRUCTIVE SLEEP APNEA): ICD-10-CM

## 2025-02-12 LAB
ALLEN'S TEST: YES
BASE EXCESS BLDA CALC-SCNC: 5.5 MMOL/L (ref -3–3)
HCO3 BLD-SCNC: 31 MMOL/L (ref 21–28)
O2/TOTAL GAS SETTING VFR VENT: 21 %
OXYHGB MFR BLDA: 94 % (ref 92–100)
PCO2 BLD: 47 MM HG (ref 35–45)
PH BLD: 7.42 [PH] (ref 7.35–7.45)
PO2 BLD: 74 MM HG (ref 80–105)
SAO2 % BLDA: 95.3 % (ref 95–96)

## 2025-02-12 PROCEDURE — 999N000157 HC STATISTIC RCP TIME EA 10 MIN

## 2025-02-12 PROCEDURE — 82805 BLOOD GASES W/O2 SATURATION: CPT

## 2025-02-12 PROCEDURE — 36600 WITHDRAWAL OF ARTERIAL BLOOD: CPT

## 2025-02-12 NOTE — PROGRESS NOTES
An ABG was completed on the pt's right radial artery @ 10:37 am on room air. Pressure was held until bleeding stopped.  No complications noted during the procedure.    Charito Proctor RT, RT  2/12/2025 10:42 AM

## 2025-02-14 ENCOUNTER — OFFICE VISIT (OUTPATIENT)
Dept: FAMILY MEDICINE | Facility: CLINIC | Age: 70
End: 2025-02-14
Payer: MEDICARE

## 2025-02-14 VITALS
HEART RATE: 65 BPM | BODY MASS INDEX: 35 KG/M2 | HEIGHT: 67 IN | TEMPERATURE: 98.1 F | DIASTOLIC BLOOD PRESSURE: 74 MMHG | WEIGHT: 223 LBS | RESPIRATION RATE: 18 BRPM | SYSTOLIC BLOOD PRESSURE: 131 MMHG | OXYGEN SATURATION: 96 %

## 2025-02-14 DIAGNOSIS — Z83.438 FAMILY HISTORY OF OTHER DISORDER OF LIPOPROTEIN METABOLISM AND OTHER LIPIDEMIA: ICD-10-CM

## 2025-02-14 DIAGNOSIS — H93.13 TINNITUS, BILATERAL: ICD-10-CM

## 2025-02-14 DIAGNOSIS — N62 GYNECOMASTIA: Primary | ICD-10-CM

## 2025-02-14 DIAGNOSIS — R68.84 JAW PAIN: ICD-10-CM

## 2025-02-14 DIAGNOSIS — M26.609 TMJ (TEMPOROMANDIBULAR JOINT SYNDROME): ICD-10-CM

## 2025-02-14 DIAGNOSIS — R93.89 ABNORMAL FINDINGS ON DIAGNOSTIC IMAGING OF OTHER SPECIFIED BODY STRUCTURES: ICD-10-CM

## 2025-02-14 PROCEDURE — 3078F DIAST BP <80 MM HG: CPT

## 2025-02-14 PROCEDURE — 99214 OFFICE O/P EST MOD 30 MIN: CPT | Mod: GC

## 2025-02-14 PROCEDURE — 84146 ASSAY OF PROLACTIN: CPT

## 2025-02-14 PROCEDURE — 84443 ASSAY THYROID STIM HORMONE: CPT

## 2025-02-14 PROCEDURE — 36415 COLL VENOUS BLD VENIPUNCTURE: CPT

## 2025-02-14 PROCEDURE — 84439 ASSAY OF FREE THYROXINE: CPT

## 2025-02-14 PROCEDURE — 83002 ASSAY OF GONADOTROPIN (LH): CPT

## 2025-02-14 PROCEDURE — 82670 ASSAY OF TOTAL ESTRADIOL: CPT

## 2025-02-14 PROCEDURE — 3075F SYST BP GE 130 - 139MM HG: CPT

## 2025-02-14 PROCEDURE — 84403 ASSAY OF TOTAL TESTOSTERONE: CPT

## 2025-02-14 RX ORDER — NAPROXEN 250 MG/1
250 TABLET ORAL 2 TIMES DAILY WITH MEALS
Qty: 28 TABLET | Refills: 0 | Status: CANCELLED | OUTPATIENT
Start: 2025-02-14 | End: 2025-02-28

## 2025-02-14 NOTE — PROGRESS NOTES
Assessment & Plan     Gynecomastia  Abnormal findings on diagnostic imaging of other specified body structures  Family history of other disorder of lipoprotein metabolism and other lipidemia  Per Mammogram and US 1/8/25, the patient has asymmetric fibroglandular tissue appreciated at retro areolar left breast. No adenopathy, breast inflammation or B symptoms to suggest breast malignancy. Considering a large differential including aging, substance use (marijuana) vs drug side effect (lisinopril), possible hypogonadism vs hyperestrogenemia vs obesity vs hyperprolactinemia vs hypothyroidism, possible idiopathic. Less likely cirrhosis (not a binge drinker, normal abdominal exam, no icterus or sequela of cirrhosis, 4/2023 LFT largely normal except intermittently elevated borderline elevated total bilirubin) vs testicular or adrenal tumor (denies scrotal/testicular masses and no masses on abdominal exam or ) or worsening renal disease (stable renal function, GFR 80s 5/2024). Work up is notable for mildly elevated estrogen to 50, normal LH and normal range T at 340. This is most consistent from obesity state and/or marijuana use vs idiopathy, however, further work up recommended: Testosterone between 8 and 10 AM,  sex hormone binding globulin (SHBG), CMP and emphasize need for  exam at follow up. May need adrenal CT vs MRI vs testicular US, although lower on differential with normal LH. Recommended abstaining from alcohol and marijuana. Consider replacing lisinopril with another antihypertensive and potentially referral for surgery after secondary causes have been worked up.   - HCG quantitative pregnancy; Future  - HCG quantitative pregnancy  - Estradiol; Future  - Testosterone total; Future  - TSH; Future  - T4 free; Future  - HCG quantitative pregnancy; Future  - Luteinizing Hormone; Future  - Prolactin; Future  - Estradiol  - Testosterone total  - TSH  - T4 free  - HCG quantitative pregnancy  - Luteinizing  "Hormone  - Prolactin    TMJ (temporomandibular joint syndrome)  Jaw pain  Clinically consistent as above with left TMJ joint tenderness and crepitus. Possible dental pain but patient has followed up with dentistry and does not appear to have concerning nearby abscess. Recommended treatment with primarily diclofenac topical (chosen over NSAIDs as patient is on lisinopril so recommended seldom use if at all) and tylenol with TMJ exercises provided. Consider steroid injection referral if still symptomatic at follow up.   - diclofenac (VOLTAREN) 1 % topical gel; Apply 2 g topically 4 times daily.    Tinnitus, bilateral  Chronic. Considering most likely from occupational hearing loss, possible meniere's, less likely vestibular neuritis, least likely schwannoma.   - Adult Audiology  Referral; Future      BMI  Estimated body mass index is 34.93 kg/m  as calculated from the following:    Height as of this encounter: 1.702 m (5' 7\").    Weight as of this encounter: 101.2 kg (223 lb).       Return in 3 weeks (on 3/7/2025), or if symptoms worsen or fail to improve, for Follow up.    Subjective   Wilian is a 69 year old, presenting for the following health issues:  Follow Up      2/14/2025     2:17 PM   Additional Questions   Roomed by Iftin         2/14/2025    Information    services provided? No     HPI   Wilian is following up today for left retroaerolar breast mass.   Reports this was tender with onset and noticed 6 months ago.   No drainage from nipple or redness, increased heat or swelling.   Does use marijuana. No other substances. Occasional, up to 1 beer/drink per day.   Medcations linked to gyneocomastia: lisinopril and marijuana.   No history of cirrhosis. No history of testosterone use. No testicular nodules or enlarged testes or scrotal masses. No masses elsewhere in groin.     Noticing mild ringing sound in ears for months to year. No headaches, hearing loss, vision changes (cataract " "in left eye). 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   -Occupation:     Mike has left jaw pain. Saw dentist who said it was unlikely from teeth that were fixed/pulled. Has pain with chewing. Questions if releated to infection from bumps benind ear if they are associated with his jaw pain.     No fevers, chills, weight loss, fatigue or night sweats.     ROS: 7 point ROS neg other than the symptoms noted above.          Objective    /74 (BP Location: Left arm, Patient Position: Sitting, Cuff Size: Adult Large)   Pulse 65   Temp 98.1  F (36.7  C) (Oral)   Resp 18   Ht 1.702 m (5' 7\")   Wt 101.2 kg (223 lb)   SpO2 96%   BMI 34.93 kg/m    Body mass index is 34.93 kg/m .  Physical Exam   GENERAL: alert, well appearing and no distress  EYES: Eyes grossly normal to inspection, PERRL, EOMI and conjunctivae and sclerae normal  HENT: NCAT, ear canals and TM's normal, nose and mouth without ulcers or lesions, poor dentition, no abscesses, TMJ tenderness with slight crepitus when unlocking.   NECK: Normal thyroid, no asymmetry, masses, or scars  RESP: lungs clear to auscultation - no rales, rhonchi or wheezes  BREAST: Left 1.5\" firm to soft, non fluctuant retro areolar breast mass, slightly tender. No right breast mass. No breast discharge.   CV: regular rate and rhythm, normal S1 S2, no murmur, no peripheral edema  ABDOMEN: soft, nontender, no hepatosplenomegaly, no masses and bowel sounds normal  MS: no gross musculoskeletal defects noted, no edema  SKIN: no suspicious lesions or rashes on breast, near ear or appreciated grossly  : patient deferred  NEURO: Normal strength and tone, mentation intact and speech normal  PSYCH: mentation appears normal, affect normal/bright  LYMPH: no cervical, clavicular or axillary adenopathy    Results for orders placed or performed in visit on 02/14/25   Estradiol     Status: None   Result Value Ref Range    Estradiol 50 pg/mL   Testosterone total     " Status: Normal   Result Value Ref Range    Testosterone Total 340 240 - 950 ng/dL   TSH     Status: Normal   Result Value Ref Range    TSH 1.95 0.30 - 4.20 uIU/mL   T4 free     Status: Normal   Result Value Ref Range    Free T4 1.15 0.90 - 1.70 ng/dL   HCG quantitative pregnancy     Status: Normal   Result Value Ref Range    hCG Quantitative <1 <5 mIU/mL   Luteinizing Hormone     Status: Normal   Result Value Ref Range    Luteinizing Hormone 3.5 1.7 - 8.6 mIU/mL   Prolactin     Status: Normal   Result Value Ref Range    Prolactin 12 4 - 15 ng/mL           Signed Electronically by: Christiano Echols MD

## 2025-02-14 NOTE — PATIENT INSTRUCTIONS
Patient Education   Here is the plan from today's visit    1. Gynecomastia (Primary)  2. Abnormal findings on diagnostic imaging of other specified body structures  6. Family history of other disorder of lipoprotein metabolism and other lipidemia  We have many possible reasons for your gynecomastia. I want to rule out concerning reasons. I recommend discontinuing marijuana use. If that does not help, we can consider a new high blood pressure medication. You might need surgery to remove the lump.   - TSH; Future  - T4 free; Future  - HCG quantitative pregnancy; Future  - Estradiol; Future  - Testosterone total; Future  - TSH; Future  - T4 free; Future  - HCG quantitative pregnancy; Future  - Luteinizing Hormone; Future  - Prolactin; Future    3. TMJ (temporomandibular joint syndrome)  4. Jaw pain  Please treat with Diclofenac cream (or seldom use of ibuprofen 600 mg every 6 hours as needed, or Naproxen 250 - 500 mg every 12 hours for 10 days), and scheduled Tylenol 1000 mg every 6 hours. Use the exercise books to help your jaw, if this is not better in 2-3 weeks with TMJ joint exercises, I want you to follow up.     5. Tinnitus, bilateral  I want you to get a hearing test and we can follow up on next steps.   - Adult Audiology  Referral; Future      Please call or return to clinic if your symptoms don't go away.    Follow up plan  Return if symptoms worsen or fail to improve, for Follow up.    Thank you for coming to [facility] today.  Lab Testing:  **If you had lab testing today and your results are reassuring or normal they will be mailed to you or sent through [employer] within 7 days.   **If the lab tests need quick action we will call you with the results.  **If you are having labs done on a different day, please call [phone] to schedule at [facility] or [phone] for other [facility]patient Lab locations. Labs do not offer walk-in appointments.  The phone number we will call with  results is # [phone] (home) . If this is not the best number please call our clinic and change the number.  Medication Refills:  If you need any refills please call your pharmacy and they will contact us.   If you need to  your refill at a new pharmacy, please contact the new pharmacy directly. The new pharmacy will help you get your medications transferred faster.   Scheduling:  If you have any concerns about today's visit or wish to schedule another appointment please call our office during normal business hours [phone] (8-5:00 M-F). If you can no longer make a scheduled visit, please cancel via [employer] or call us to cancel.   If a referral was made to an [facility] specialty provider and you do not get a call from central scheduling, please refer to directions on your visit summary or call our office during normal business hours for assistance.   If a Mammogram was ordered for you at the Breast Center call [phone] to schedule or change your appointment.  If you had an XRay/CT/Ultrasound/MRI ordered the number is [phone] to schedule or change your radiology appointment.   [facility] has limited ultrasound appointments available on Wednesdays, if you would like your ultrasound at [facility], please call [phone] to schedule.   Medical Concerns:  If you have urgent medical concerns please call [phone] at any time of the day.    Christiano Echols MD

## 2025-02-15 DIAGNOSIS — E78.5 HYPERLIPIDEMIA LDL GOAL <130: ICD-10-CM

## 2025-02-15 LAB
ESTRADIOL SERPL-MCNC: 50 PG/ML
HCG INTACT+B SERPL-ACNC: <1 MIU/ML
LH SERPL-ACNC: 3.5 MIU/ML (ref 1.7–8.6)
PROLACTIN SERPL 3RD IS-MCNC: 12 NG/ML (ref 4–15)
T4 FREE SERPL-MCNC: 1.15 NG/DL (ref 0.9–1.7)
TSH SERPL DL<=0.005 MIU/L-ACNC: 1.95 UIU/ML (ref 0.3–4.2)

## 2025-02-17 RX ORDER — ATORVASTATIN CALCIUM 40 MG/1
40 TABLET, FILM COATED ORAL AT BEDTIME
Qty: 90 TABLET | Refills: 3 | Status: SHIPPED | OUTPATIENT
Start: 2025-02-17

## 2025-02-17 NOTE — TELEPHONE ENCOUNTER
"Request for medication refill: atorvastatin (LIPITOR) 40 MG tablet     Providers if patient needs an appointment and you are willing to give a one month supply please refill for one month and  send a letter/MyChart using \".SMILLIMITEDREFILL\" .smillimited and route chart to \"P SMI \" (Giving one month refill in non controlled medications is strongly recommended before denial)    If refill has been denied, meaning absolutely no refills without visit, please complete the smart phrase \".smirxrefuse\" and route it to the \"P SMI MED REFILLS\"  pool to inform the patient and the pharmacy.    Keanu Bassett, [facility]     "

## 2025-02-19 LAB — TESTOST SERPL-MCNC: 340 NG/DL (ref 240–950)

## 2025-02-24 ENCOUNTER — TELEPHONE (OUTPATIENT)
Dept: FAMILY MEDICINE | Facility: CLINIC | Age: 70
End: 2025-02-24
Payer: MEDICARE

## 2025-02-24 NOTE — TELEPHONE ENCOUNTER
Called pharmacy, they do have the script for the Atenolol and will fill for patient, in regards to diclofenac I let patient know that he can pay out of pocket for the gel and it is about $16 with [facility].    Patient expressed understanding    Yulisa Cole RN

## 2025-02-24 NOTE — TELEPHONE ENCOUNTER
[facility] phone call message- medication clarification/question:    Full Medication Name: atenolol (TENORMIN)   Dose: 100 MG tablet     Question: Pt states [facility] told him they do not have a script for this medication. I told patient that med is active in his chart with one refill left, patient requests medication be sent over again to pharmacy.     Full Medication Name: diclofenac (VOLTAREN)    Dose: 1 % topical gel     Question: Patient states his insurance does not cover this medication and that he hasn't taken it yet. Patient requesting a call back to discuss options.      Pharmacy confirmed as [id number] - [place], [place] - [address] [place] [address] AT [address]: Yes    OK to leave a message on voice mail? Yes    Primary language: English      needed? No    Call taken on February 24, 2025 at 2:24 PM by Yas Beverly

## 2025-02-26 ASSESSMENT — SLEEP AND FATIGUE QUESTIONNAIRES
HOW LIKELY ARE YOU TO NOD OFF OR FALL ASLEEP WHILE LYING DOWN TO REST IN THE AFTERNOON WHEN CIRCUMSTANCES PERMIT: HIGH CHANCE OF DOZING
HOW LIKELY ARE YOU TO NOD OFF OR FALL ASLEEP WHILE SITTING QUIETLY AFTER LUNCH WITHOUT ALCOHOL: WOULD NEVER DOZE
HOW LIKELY ARE YOU TO NOD OFF OR FALL ASLEEP WHILE SITTING AND READING: WOULD NEVER DOZE
HOW LIKELY ARE YOU TO NOD OFF OR FALL ASLEEP WHILE SITTING INACTIVE IN A PUBLIC PLACE: SLIGHT CHANCE OF DOZING
HOW LIKELY ARE YOU TO NOD OFF OR FALL ASLEEP WHILE WATCHING TV: WOULD NEVER DOZE
HOW LIKELY ARE YOU TO NOD OFF OR FALL ASLEEP WHILE SITTING AND TALKING TO SOMEONE: WOULD NEVER DOZE
HOW LIKELY ARE YOU TO NOD OFF OR FALL ASLEEP WHEN YOU ARE A PASSENGER IN A CAR FOR AN HOUR WITHOUT A BREAK: SLIGHT CHANCE OF DOZING
HOW LIKELY ARE YOU TO NOD OFF OR FALL ASLEEP IN A CAR, WHILE STOPPED FOR A FEW MINUTES IN TRAFFIC: WOULD NEVER DOZE

## 2025-02-27 ENCOUNTER — OFFICE VISIT (OUTPATIENT)
Dept: SLEEP MEDICINE | Facility: CLINIC | Age: 70
End: 2025-02-27
Payer: MEDICARE

## 2025-02-27 VITALS
BODY MASS INDEX: 35 KG/M2 | WEIGHT: 223 LBS | HEART RATE: 65 BPM | OXYGEN SATURATION: 97 % | HEIGHT: 67 IN | DIASTOLIC BLOOD PRESSURE: 70 MMHG | RESPIRATION RATE: 17 BRPM | SYSTOLIC BLOOD PRESSURE: 122 MMHG

## 2025-02-27 DIAGNOSIS — G47.21 CIRCADIAN RHYTHM SLEEP DISORDER, DELAYED SLEEP PHASE TYPE: ICD-10-CM

## 2025-02-27 DIAGNOSIS — G47.33 OBSTRUCTIVE SLEEP APNEA SYNDROME: Primary | ICD-10-CM

## 2025-02-27 NOTE — NURSING NOTE
"Chief Complaint   Patient presents with    Study Results     Psg results        Initial /70   Pulse 65   Resp 17   Ht 1.702 m (5' 7.01\")   Wt 101.2 kg (223 lb)   SpO2 97%   BMI 34.92 kg/m   Estimated body mass index is 34.92 kg/m  as calculated from the following:    Height as of this encounter: 1.702 m (5' 7.01\").    Weight as of this encounter: 101.2 kg (223 lb).    Medication Reconciliation: complete      DME: N/A        GABRIELLA Avelar      "

## 2025-02-27 NOTE — PROGRESS NOTES
Sleep Apnea - Follow-up Visit:  Impression/Plan:  # Severe Sleep apnea  He was prescribed on APAP of 10 to 20 cm water with a full facemask as he has mustache.  He was educated how to desensitize and wear the mask before starting using CPAP at night.  He agreed to start using the CPAP.  He was also provided the locations and phone numbers of [facility].     Wilian Ness will follow up in about 3 month(s) after starting on the APAP 10 to 20 cm water.    Forty minutes spent with patient, all of which were spent face-to-face counseling, consulting, coordinating plan of care.      Case staffed with Dr. Lucia Johns MD  PGY 4  Sleep Medicine Fellow    CC:  Leticia Peguero    History of Present Illness:  Mr. August is a 69 year-old gentleman who was seen by Dr. Myers in October 2024.  he has a sleep study done on 13 February 2025 and found to have a severe sleep apnea with AHI of 84.6 with a supine AHI of 180.  Titration was unsuccessful with the CPAP of 8 with a residual AHI of 18.9 without REM supine.  He was recommended to start using the scarlet of 10 to 20 cm water but he has not started using it.  Because he would like to explore additional alternative treatment option to treat his sleep apnea.  He thinks like he will not be able to with sleep mask during his sleep as he is a side sleeper.    Wilian Ness presents for follow-up of their severe sleep apnea, managed with CPAP.     No specialty comments available.    What is your typical bedtime:  4-6 am  What time do you typically get out of bed for the day:  12 noon  How many hours are you sleeping per night:  6 hr with 2-3 hrs of nap  Do you feel well rested in the morning:  No       EPWORTH SLEEPINESS SCALE         2/26/2025     4:20 PM    [place] Sleepiness Scale ( ELIZA Magana  [id number]<br>ESS - [place]/English - Final version - 21 Nov 07 - [facility] [place].)   Sitting and reading Would never doze   Watching TV Would never doze   Sitting, inactive  in a public place (e.g. a theatre or a meeting) Slight chance of dozing   As a passenger in a car for an hour without a break Slight chance of dozing   Lying down to rest in the afternoon when circumstances permit High chance of dozing   Sitting and talking to someone Would never doze   Sitting quietly after a lunch without alcohol Would never doze   In a car, while stopped for a few minutes in traffic Would never doze   [place] Score (MC) 5   [place] Score (Sleep) 5        Patient-reported       INSOMNIA SEVERITY INDEX (LISA)          2/26/2025     4:17 PM   Insomnia Severity Index (LISA)   Difficulty falling asleep 1   Difficulty staying asleep 1   Problems waking up too early 0   How SATISFIED/DISSATISFIED are you with your CURRENT sleep pattern? 1   How NOTICEABLE to others do you think your sleep problem is in terms of impairing the quality of your life? 1   How WORRIED/DISTRESSED are you about your current sleep problem? 1   To what extent do you consider your sleep problem to INTERFERE with your daily functioning (e.g. daytime fatigue, mood, ability to function at work/daily chores, concentration, memory, mood, etc.) CURRENTLY? 1   LISA Total Score 6        Patient-reported       Guidelines for Scoring/Interpretation:  Total score categories:  0-7 = No clinically significant insomnia   8-14 = Subthreshold insomnia   15-21 = Clinical insomnia (moderate severity)  22-28 = Clinical insomnia (severe)  Used via courtesy of www.[place].gov with permission from Te Alegre PhD., [facility]      Past medical/surgical history, family history, social history, medications and allergies were reviewed.        Problem List:  Patient Active Problem List    Diagnosis Date Noted    Tubular adenoma of colon 11/06/2012     Priority: High     positive iFIT test 5/2011;   colonoscopy 8.26.12 - 3 tubular adenomas ( 2 Ascending colon - 7mm, 14mm) (1 Transverse colon - 4 mm).   Colonoscopy 9/9/13 - 12mm polyp. - repeat 1  "year  Colonoscopy 9/18/15 - 10mm polyp - repeat 2 years  Declines colonoscopy 10/2018 - FIT negative  Colonoscopy 6/2020 - no polyps - repeat 5-10 years      Former smoker 04/20/2022     Priority: Medium    Benign prostatic hyperplasia with nocturia 10/10/2018     Priority: Medium    Neck mass 09/01/2015     Priority: Medium     Likely lipoma on R neck      Health care home, active care coordination      Priority: Medium     Tier 1      Essential hypertension with goal blood pressure less than 140/90      Priority: Medium    Hyperlipidemia LDL goal <130      Priority: Medium        /70   Pulse 65   Resp 17   Ht 1.702 m (5' 7.01\")   Wt 101.2 kg (223 lb)   SpO2 97%   BMI 34.92 kg/m          "

## 2025-03-10 ENCOUNTER — DOCUMENTATION ONLY (OUTPATIENT)
Dept: SLEEP MEDICINE | Facility: CLINIC | Age: 70
End: 2025-03-10
Payer: MEDICARE

## 2025-03-10 DIAGNOSIS — G47.33 OBSTRUCTIVE SLEEP APNEA (ADULT) (PEDIATRIC): Primary | ICD-10-CM

## 2025-03-11 ENCOUNTER — APPOINTMENT (OUTPATIENT)
Dept: SLEEP MEDICINE | Facility: CLINIC | Age: 70
End: 2025-03-11
Payer: MEDICARE

## 2025-03-11 NOTE — PROGRESS NOTES
Patient was offered choice of vendor and chose [facility].  Patient Wilian Ness was set up at [facility]  on March 10, 2025. Patient received a Resmed Airsense 11. Pressures were set at  10-20 cmH2O.  Patient s ramp is 6 cmH2O for auto and FLEX/EPR is EPR, 2. Patient received a Resmed mask name: Airfit P30i nasal pillow mask size standard, heated tubing and heated humidifier. Patient needs compliant usage and sleep follow up for Medicare compliance. Patient has a follow up on 6/24/2025 with MD Wendie Salgado

## 2025-03-17 ENCOUNTER — LAB (OUTPATIENT)
Dept: LAB | Facility: CLINIC | Age: 70
End: 2025-03-17
Payer: MEDICARE

## 2025-03-17 DIAGNOSIS — N62 GYNECOMASTIA: ICD-10-CM

## 2025-03-17 PROCEDURE — 36415 COLL VENOUS BLD VENIPUNCTURE: CPT

## 2025-03-17 PROCEDURE — 84270 ASSAY OF SEX HORMONE GLOBUL: CPT

## 2025-03-18 LAB
ALBUMIN SERPL BCG-MCNC: 4.1 G/DL (ref 3.5–5.2)
ALP SERPL-CCNC: 83 U/L (ref 40–150)
ALT SERPL W P-5'-P-CCNC: 23 U/L (ref 0–70)
ANION GAP SERPL CALCULATED.3IONS-SCNC: 13 MMOL/L (ref 7–15)
AST SERPL W P-5'-P-CCNC: 30 U/L (ref 0–45)
BILIRUB SERPL-MCNC: 1.3 MG/DL
BUN SERPL-MCNC: 10.6 MG/DL (ref 8–23)
CALCIUM SERPL-MCNC: 9.7 MG/DL (ref 8.8–10.4)
CHLORIDE SERPL-SCNC: 95 MMOL/L (ref 98–107)
CREAT SERPL-MCNC: 0.98 MG/DL (ref 0.67–1.17)
EGFRCR SERPLBLD CKD-EPI 2021: 85 ML/MIN/1.73M2
GLUCOSE SERPL-MCNC: 110 MG/DL (ref 70–99)
HCO3 SERPL-SCNC: 29 MMOL/L (ref 22–29)
POTASSIUM SERPL-SCNC: 4.2 MMOL/L (ref 3.4–5.3)
PROT SERPL-MCNC: 7 G/DL (ref 6.4–8.3)
SHBG SERPL-SCNC: 56 NMOL/L (ref 11–80)
SODIUM SERPL-SCNC: 137 MMOL/L (ref 135–145)

## 2025-03-20 LAB
TESTOST FREE SERPL-MCNC: 5.24 NG/DL
TESTOST SERPL-MCNC: 371 NG/DL (ref 240–950)

## 2025-04-08 ENCOUNTER — OFFICE VISIT (OUTPATIENT)
Dept: FAMILY MEDICINE | Facility: CLINIC | Age: 70
End: 2025-04-08
Payer: MEDICARE

## 2025-04-08 VITALS
OXYGEN SATURATION: 96 % | HEIGHT: 67 IN | RESPIRATION RATE: 16 BRPM | BODY MASS INDEX: 35.24 KG/M2 | HEART RATE: 66 BPM | TEMPERATURE: 97.6 F | SYSTOLIC BLOOD PRESSURE: 123 MMHG | DIASTOLIC BLOOD PRESSURE: 70 MMHG

## 2025-04-08 DIAGNOSIS — M99.01 SEGMENTAL AND SOMATIC DYSFUNCTION OF CERVICAL REGION: ICD-10-CM

## 2025-04-08 DIAGNOSIS — M99.00 SEGMENTAL AND SOMATIC DYSFUNCTION OF HEAD REGION: ICD-10-CM

## 2025-04-08 DIAGNOSIS — M26.609 TMJ (TEMPOROMANDIBULAR JOINT SYNDROME): Primary | ICD-10-CM

## 2025-04-08 DIAGNOSIS — M99.07 SEGMENTAL AND SOMATIC DYSFUNCTION OF UPPER EXTREMITY: ICD-10-CM

## 2025-04-08 RX ORDER — SODIUM FLUORIDE 6 MG/ML
1 PASTE, DENTIFRICE DENTAL DAILY
COMMUNITY
Start: 2025-02-26

## 2025-04-08 NOTE — PROGRESS NOTES
Preceptor Attestation:   Patient seen, evaluated and discussed with the resident. I have verified the content of the note, which accurately reflects my assessment of the patient and the plan of care.   Supervising Physician:  Dante Llanes MD

## 2025-04-08 NOTE — PROGRESS NOTES
Assessment & Plan     TMJ (temporomandibular joint syndrome)  Patient is a 70-year-old male who presents for new OMT evaluation for suspected TMJ syndrome.  Following initial evaluation today, additional differential diagnoses considered including dental malalignment (supposed to be wearing prostheses for missing left upper molars but is not), Meniere's (tinnitus, though preceded new pain), mastoiditis (non-tender to palpation, no recent illness), musculoskeletal strain (pain with movement), temporal arteritis (pain lower than would be expected, non-tender to palpation, no vision changes, normal neuro exam). Given that this has been interfering with the patient's quality of life and ADLs, after discussion, informed consent, and medical assessment for safety, we have together decided to address this concern with Osteopathic Manipulative Treatment.  Handout provided for TMJ isometric exercises.  Patient may continue to take Tylenol or ibuprofen as needed for acute pain.  Will plan to see the patient back in 2 weeks for reevaluation.  Could consider obtaining baseline ESR, CRP at next visit if symptoms not improved.    Segmental and somatic dysfunction of head region  Segmental and somatic dysfunction of upper extremity  Segmental and somatic dysfunction of cervical region  Please see OMT Procedure Note below for the specifics of treatment.  - OSTEOPATHIC MANIP,3-4 BODY REGN      Return in about 2 weeks (around 4/22/2025) for OMT.    Rigo Cedeno is a 70 year old, presenting for the following health issues:  OTHER (OMT)        4/8/2025    10:03 AM   Additional Questions   Roomed by Doua   Accompanied by Self         4/8/2025   Declines Weight   Did patient decline having their weight taken? Yes         4/8/2025    10:03 AM   Patient Reported Additional Medications   Patient reports taking the following new medications n/a         4/8/2025    Information    services provided? No     HPI   "  Patient here to discuss the following:    TMJ:  - Pain with chewing, left side  - Has had for 3-4 months, unchanged  - No triggers or trauma  - Missing 2 molars upper jaw, supposed to be wearing plate  - Pain is posterior angle of mandible  - Ibuprofen prn, not regularly  - Right sided headaches  - No vision changes, no numbness or tingling  - No recent illness  - Went to dentist last week, did not identify any acute infections    Tinnitus:  - Both ears  - Worked with wood   - Several years     Objective    /70   Pulse 66   Temp 97.6  F (36.4  C) (Temporal)   Resp 16   Ht 1.702 m (5' 7\")   SpO2 96%   BMI 35.24 kg/m    Body mass index is 35.24 kg/m .  Physical Exam   Vitals reviewed.   Constitutional: awake, alert, cooperative, in NAD  Respiratory: Normal pulmonary effort without coughing or wheezing  Skin: Warm & dry without abnormalities  Neurologic: A&Ox4, without focal deficit, cranial nerves II-XII intact  Psychiatric: Alert & calm with appropriate affect, mood, & cognition    Osteopathic and Musculoskeletal exam found below in OMT Procedure Note.    OMT Procedure Note  Body Region: Head, Face, and/or Jaw  Somatic Dysfunction:  suboccipital tension, jaw deviation right with opening  Treatment: Direct Muscle Energy and Soft Tissue Techniques.  Outcome: Improved    Body Region: C-spine / Neck  Somatic Dysfunction:  bilateral cervical paraspinal hypertonicity  Treatment: Soft Tissue Techniques.  Outcome: Improved    Body Region: Shoulder, UE, and/or Hand  Somatic Dysfunction:  right trapezius tenderpoint, left levator scapular tenderpoint   Treatment: Counterstrain, Soft Tissue, and scapular lift/rib raising Techniques.  Outcome: Improved      The patient actively participated in OMT and was able to communicate both positive and negative feedback throughout. OMT completed without incident. Patient tolerated treatment well. Patient reported that ROM, function, and/or pain level were improved. " Advised that pain is occasionally worse during the first 24 hours after treatment and that drinking more water and taking Tylenol or Ibuprofen often help. Patient to return in 2 week/s or as needed for repeat osteopathic evaluation.           Signed Electronically by: Rafael Arnold DO

## 2025-04-17 ENCOUNTER — DOCUMENTATION ONLY (OUTPATIENT)
Dept: SLEEP MEDICINE | Facility: CLINIC | Age: 70
End: 2025-04-17
Payer: MEDICARE

## 2025-04-17 NOTE — PROGRESS NOTES
30 DAY [facility] VISIT    Diagnostic AHI: PS.6         Data only recheck     Assessment: Pt meeting objective benchmarks.     Patient has the following upcoming sleep appts:  Future Sleep Appointments         Provider Department    2025 9:30 AM (Arrive by 9:15 AM) Melvin Godfrey APRN CNP [facility]    2025 9:30 AM (Arrive by 9:15 AM) Mray Myers MD [facility]          Device type: Auto-CPAP  PAP settings: CPAP min 10.0 cm  H20     CPAP max 20.0 cm  H20    95th% pressure 18.7 cm  H20      RESMED EPR level Setting: TWO    RESMED Soft response setting:  OFF  Mask type:    Objective measures: 14 day rolling measures      Compliance  100 %      Leak  29.36 lpm  last  upload      AHI 1.67   last  upload      Average number of minutes 350      Objective measure goal  Compliance   Goal >70%  Leak   Goal < 24 lpm  AHI  Goal < 5  Usage  Goal >240        Total time spent on accessing and interpreting remote patient PAP therapy data  10 minutes    Total time spent counseling, coaching  and reviewing PAP therapy data with patient  0 minutes     [id number] this call  [id number] no  at 3 or 14 day [facility]

## 2025-04-24 ENCOUNTER — OFFICE VISIT (OUTPATIENT)
Dept: FAMILY MEDICINE | Facility: CLINIC | Age: 70
End: 2025-04-24
Payer: MEDICARE

## 2025-04-24 VITALS
BODY MASS INDEX: 35.24 KG/M2 | SYSTOLIC BLOOD PRESSURE: 119 MMHG | TEMPERATURE: 98 F | HEIGHT: 67 IN | HEART RATE: 60 BPM | OXYGEN SATURATION: 96 % | DIASTOLIC BLOOD PRESSURE: 68 MMHG | RESPIRATION RATE: 15 BRPM

## 2025-04-24 DIAGNOSIS — M99.07 SEGMENTAL AND SOMATIC DYSFUNCTION OF UPPER EXTREMITY: ICD-10-CM

## 2025-04-24 DIAGNOSIS — M99.00 SEGMENTAL AND SOMATIC DYSFUNCTION OF HEAD REGION: ICD-10-CM

## 2025-04-24 DIAGNOSIS — M99.02 SEGMENTAL AND SOMATIC DYSFUNCTION OF THORACIC REGION: ICD-10-CM

## 2025-04-24 DIAGNOSIS — M99.01 SEGMENTAL AND SOMATIC DYSFUNCTION OF CERVICAL REGION: ICD-10-CM

## 2025-04-24 DIAGNOSIS — M99.08 SEGMENTAL AND SOMATIC DYSFUNCTION OF RIB CAGE: ICD-10-CM

## 2025-04-24 DIAGNOSIS — R68.84 JAW PAIN: Primary | ICD-10-CM

## 2025-04-24 NOTE — PROGRESS NOTES
Assessment & Plan     Jaw pain  Patient presents with ongoing left-sided jaw pain, now here for OMT evaluation visit #2.  Since last visit, patient notes mild improvement with isometric exercises and OMT.  Pain still significantly exacerbated by chewing. Given that this has been interfering with the patient's quality of life and ADLs, after discussion, informed consent, and medical assessment for safety, we have together decided to address this concern with Osteopathic Manipulative Treatment.  In addition, did consider obtaining lab work for further evaluation, however symptoms not felt to be consistent with temporal arteritis.  If symptoms not improving with exercises and OMT, consider ENT referral.    Segmental and somatic dysfunction of head region  Segmental and somatic dysfunction of thoracic region  Segmental and somatic dysfunction of upper extremity  Segmental and somatic dysfunction of rib cage  Segmental and somatic dysfunction of cervical region  Please see OMT Procedure Note below for the specifics of treatment.  - OSTEOPATHIC MANIP,5-6 BODY REGN      Follow-up  Return in about 2 weeks (around 5/8/2025) for Repeat OMT eval.    Rigo Cedeno is a 70 year old, presenting for the following health issues:  OTHER (OMT)        4/24/2025    10:58 AM   Additional Questions   Roomed by Doua   Accompanied by Self         4/24/2025   Declines Weight   Did patient decline having their weight taken? Yes         4/24/2025    10:58 AM   Patient Reported Additional Medications   Patient reports taking the following new medications n/a         4/24/2025    Information    services provided? No     HPI    Patient here to discuss the following:    Jaw pain:  - Little bit of relief since last visit  - Doing isometric exercises 2-3 times daily  - Meals and chewing seem to exacerbate it  - Worse at the end of the day  - Notices jaw clicking  - Headaches: Points to left TMJ region for location of  "headache  - Takes one iburprofen prn  - No fevers, numbness, tingling        Objective    /68   Pulse 60   Temp 98  F (36.7  C) (Temporal)   Resp 15   Ht 1.702 m (5' 7\")   SpO2 96%   BMI 35.24 kg/m    Body mass index is 35.24 kg/m .  Physical Exam   Vitals reviewed.   Constitutional: awake, alert, cooperative, in NAD  Respiratory: Normal pulmonary effort without coughing or wheezing  Skin: Warm & dry without abnormalities  Neurologic: A&Ox4, without focal deficit, cranial nerves II-XII grossly intact  Psychiatric: Alert & calm with appropriate affect, mood, & cognition    Osteopathic and Musculoskeletal exam found below in OMT Procedure Note.    OMT Procedure Note  Body Region: Head, Face, and/or Jaw  Somatic Dysfunction:  suboccipital tension, bilateral  Treatment: Soft Tissue Techniques.  Outcome: Improved    Body Region: C-spine / Neck  Somatic Dysfunction:  AA Rr , Anterior C1, C7, C8 tender points  Treatment: Direct Muscle Energy, Counterstrain Techniques.  Outcome: Improved    Body Region: T-spine and/or Ribs  Somatic Dysfunction:  Inlet RrSr, elevated first rib left  Treatment: Direct Muscle Energy Techniques.   Outcome: Improved    Body Region: Shoulder, UE, and/or Hand  Somatic Dysfunction:  SCM hypertonicity  Treatment: Direct Muscle Energy Techniques.  Outcome: Improved      The patient actively participated in OMT and was able to communicate both positive and negative feedback throughout. OMT completed without incident. Patient tolerated treatment well. Patient reported that ROM, function, and/or pain level were improved. Advised that pain is occasionally worse during the first 24 hours after treatment and that drinking more water and taking Tylenol or Ibuprofen often help. Patient to return in 2 week/s or as needed for repeat osteopathic evaluation.           Signed Electronically by: Rafael Arnold DO    "

## 2025-05-09 ENCOUNTER — OFFICE VISIT (OUTPATIENT)
Dept: FAMILY MEDICINE | Facility: CLINIC | Age: 70
End: 2025-05-09
Payer: MEDICARE

## 2025-05-09 VITALS
BODY MASS INDEX: 35.41 KG/M2 | DIASTOLIC BLOOD PRESSURE: 66 MMHG | HEART RATE: 57 BPM | WEIGHT: 226.1 LBS | TEMPERATURE: 97.6 F | SYSTOLIC BLOOD PRESSURE: 110 MMHG | OXYGEN SATURATION: 96 % | RESPIRATION RATE: 16 BRPM

## 2025-05-09 DIAGNOSIS — M99.01 SEGMENTAL AND SOMATIC DYSFUNCTION OF CERVICAL REGION: ICD-10-CM

## 2025-05-09 DIAGNOSIS — M99.02 SEGMENTAL AND SOMATIC DYSFUNCTION OF THORACIC REGION: ICD-10-CM

## 2025-05-09 DIAGNOSIS — R68.84 JAW PAIN: Primary | ICD-10-CM

## 2025-05-09 DIAGNOSIS — M99.00 SEGMENTAL AND SOMATIC DYSFUNCTION OF HEAD REGION: ICD-10-CM

## 2025-05-09 DIAGNOSIS — M99.08 SEGMENTAL AND SOMATIC DYSFUNCTION OF RIB CAGE: ICD-10-CM

## 2025-05-09 DIAGNOSIS — M99.07 SEGMENTAL AND SOMATIC DYSFUNCTION OF UPPER EXTREMITY: ICD-10-CM

## 2025-05-09 RX ORDER — ACETAMINOPHEN 500 MG
500-1000 TABLET ORAL EVERY 6 HOURS PRN
Qty: 90 TABLET | Refills: 3 | Status: SHIPPED | OUTPATIENT
Start: 2025-05-09

## 2025-05-09 NOTE — PROGRESS NOTES
"  Assessment & Plan     Jaw pain  Chronic condition with exacerbation.  Overall felt to be improving.  Encouraged patient to continue Tylenol as needed, isometric exercises. Given that this has been interfering with the patient's quality of life and ADLs, after discussion, informed consent, and medical assessment for safety, we have together decided to address this concern with Osteopathic Manipulative Treatment.  Today was OMT treatment #3 for this issue.  Given ongoing symptoms despite OMT and conservative management, will consider ENT referral for further evaluation.  - acetaminophen (TYLENOL) 500 MG tablet  Dispense: 90 tablet; Refill: 3  - Adult ENT  Referral    Segmental and somatic dysfunction of upper extremity  Segmental and somatic dysfunction of head region  Segmental and somatic dysfunction of thoracic region  Segmental and somatic dysfunction of rib cage  Segmental and somatic dysfunction of cervical region  Please see OMT Procedure Note below for the specifics of treatment.  - OSTEOPATHIC MANIP,5-6 BODY REGN      Follow-up  Return in about 2 weeks (around 5/23/2025) for AWV.    Rigo Cedeno is a 70 year old, presenting for the following health issues:  OTHER (OMT Follow up)        5/9/2025     8:33 AM   Additional Questions   Roomed by Pa   Accompanied by Self         5/9/2025     8:33 AM   Patient Reported Additional Medications   Patient reports taking the following new medications n/a         5/9/2025    Information    services provided? No     HPI    Patient here to discuss the following:    Jaw pain:  - Doing isometric exercises  - Feels less popping/clicking, not completely gone  - Still not eating on left side  - Ringing in ears  - Worse in evening  - Still endorses \"headaches,\" pain in left preauricular region  - Takes one tylenol with some improvement, not big on taking pain medications  - Unsure if OMT significantly helpful        Objective    /66   " Pulse 57   Temp 97.6  F (36.4  C) (Temporal)   Resp 16   Wt 102.6 kg (226 lb 1.6 oz)   SpO2 96%   BMI 35.41 kg/m    Body mass index is 35.41 kg/m .  Physical Exam   Vitals reviewed.   Constitutional: awake, alert, cooperative, in NAD  Respiratory: Normal pulmonary effort without coughing or wheezing  Skin: Warm & dry without abnormalities  Neurologic: A&Ox4, without focal deficit, cranial nerves II-XII grossly intact  Psychiatric: Alert & calm with appropriate affect, mood, & cognition    Osteopathic and Musculoskeletal exam found below in OMT Procedure Note.    OMT Procedure Note  Body Region: Head, Face, and/or Jaw  Somatic Dysfunction: Jaw deviation right, suboccipital tension bilaterally  Treatment: Direct Muscle Energy, soft tissue Techniques.  Outcome: Improved    Body Region: C-spine / Neck  Somatic Dysfunction: AA Rr, Anterior C1 tenderpoint  Treatment: Direct Muscle Energy Techniques.  Outcome: Improved    Body Region: T-spine and/or Ribs  Somatic Dysfunction: Inlet RrSr, elevated first rib left  Treatment: Direct Muscle Energy Techniques.   Outcome: Improved    Body Region: Shoulder, UE, and/or Hand  Somatic Dysfunction: left trapezius tenderpoint  Treatment: Soft Tissue Techniques.  Outcome: Improved      The patient actively participated in OMT and was able to communicate both positive and negative feedback throughout. OMT completed without incident. Patient tolerated treatment well. Patient reported that ROM, function, and/or pain level were improved. Advised that pain is occasionally worse during the first 24 hours after treatment and that drinking more water and taking Tylenol or Ibuprofen often help. Patient to return in 2 week/s or as needed for repeat osteopathic evaluation.           Signed Electronically by: Rafael Arnold DO

## 2025-05-09 NOTE — PATIENT INSTRUCTIONS
[facility] also has a Temporal mandibular dysfunction, Orofacial Pain, and Dental Sleep Medicine Clinic that may be able to better address your needs than ENT. You can schedule an appointment by calling [phone] 8:00am - 4:00pm, Monday - Friday. This is something to consider either in replacement of or in addition to the ENT referral I have placed today depending on your level of concern.

## 2025-05-12 ENCOUNTER — PATIENT OUTREACH (OUTPATIENT)
Dept: CARE COORDINATION | Facility: CLINIC | Age: 70
End: 2025-05-12
Payer: MEDICARE

## 2025-05-14 ENCOUNTER — PATIENT OUTREACH (OUTPATIENT)
Dept: CARE COORDINATION | Facility: CLINIC | Age: 70
End: 2025-05-14
Payer: MEDICARE

## 2025-05-24 SDOH — HEALTH STABILITY: PHYSICAL HEALTH: ON AVERAGE, HOW MANY MINUTES DO YOU ENGAGE IN EXERCISE AT THIS LEVEL?: 10 MIN

## 2025-05-24 SDOH — HEALTH STABILITY: PHYSICAL HEALTH: ON AVERAGE, HOW MANY DAYS PER WEEK DO YOU ENGAGE IN MODERATE TO STRENUOUS EXERCISE (LIKE A BRISK WALK)?: 0 DAYS

## 2025-05-24 ASSESSMENT — SOCIAL DETERMINANTS OF HEALTH (SDOH): HOW OFTEN DO YOU GET TOGETHER WITH FRIENDS OR RELATIVES?: NEVER

## 2025-05-27 ENCOUNTER — OFFICE VISIT (OUTPATIENT)
Dept: FAMILY MEDICINE | Facility: CLINIC | Age: 70
End: 2025-05-27
Payer: MEDICARE

## 2025-05-27 VITALS
BODY MASS INDEX: 35.17 KG/M2 | HEART RATE: 58 BPM | TEMPERATURE: 98 F | RESPIRATION RATE: 15 BRPM | SYSTOLIC BLOOD PRESSURE: 120 MMHG | DIASTOLIC BLOOD PRESSURE: 68 MMHG | HEIGHT: 67 IN | WEIGHT: 224.1 LBS | OXYGEN SATURATION: 95 %

## 2025-05-27 DIAGNOSIS — I10 ESSENTIAL HYPERTENSION WITH GOAL BLOOD PRESSURE LESS THAN 130/80: ICD-10-CM

## 2025-05-27 DIAGNOSIS — R68.84 JAW PAIN: ICD-10-CM

## 2025-05-27 DIAGNOSIS — J44.9 CHRONIC OBSTRUCTIVE PULMONARY DISEASE, UNSPECIFIED COPD TYPE (H): ICD-10-CM

## 2025-05-27 DIAGNOSIS — R35.1 BENIGN PROSTATIC HYPERPLASIA WITH NOCTURIA: ICD-10-CM

## 2025-05-27 DIAGNOSIS — Z13.6 SCREENING FOR CARDIOVASCULAR CONDITION: ICD-10-CM

## 2025-05-27 DIAGNOSIS — Z87.891 HISTORY OF TOBACCO USE, PRESENTING HAZARDS TO HEALTH: ICD-10-CM

## 2025-05-27 DIAGNOSIS — F10.90 ALCOHOL USE: ICD-10-CM

## 2025-05-27 DIAGNOSIS — R91.8 PULMONARY NODULES: ICD-10-CM

## 2025-05-27 DIAGNOSIS — I25.10 CORONARY ARTERY DISEASE DUE TO CALCIFIED CORONARY LESION: ICD-10-CM

## 2025-05-27 DIAGNOSIS — J43.8 OTHER EMPHYSEMA (H): ICD-10-CM

## 2025-05-27 DIAGNOSIS — Z00.00 ENCOUNTER FOR MEDICARE ANNUAL WELLNESS EXAM: Primary | ICD-10-CM

## 2025-05-27 DIAGNOSIS — Z12.5 ENCOUNTER FOR SCREENING FOR MALIGNANT NEOPLASM OF PROSTATE: ICD-10-CM

## 2025-05-27 DIAGNOSIS — N18.2 CKD (CHRONIC KIDNEY DISEASE) STAGE 2, GFR 60-89 ML/MIN: ICD-10-CM

## 2025-05-27 DIAGNOSIS — E78.5 HYPERLIPIDEMIA LDL GOAL <70: ICD-10-CM

## 2025-05-27 DIAGNOSIS — R22.1 NECK MASS: ICD-10-CM

## 2025-05-27 DIAGNOSIS — Z12.11 SCREEN FOR COLON CANCER: ICD-10-CM

## 2025-05-27 DIAGNOSIS — E78.5 HYPERLIPIDEMIA LDL GOAL <130: ICD-10-CM

## 2025-05-27 DIAGNOSIS — I25.84 CORONARY ARTERY DISEASE DUE TO CALCIFIED CORONARY LESION: ICD-10-CM

## 2025-05-27 DIAGNOSIS — I10 ESSENTIAL HYPERTENSION WITH GOAL BLOOD PRESSURE LESS THAN 140/90: ICD-10-CM

## 2025-05-27 DIAGNOSIS — N40.1 BENIGN PROSTATIC HYPERPLASIA WITH NOCTURIA: ICD-10-CM

## 2025-05-27 LAB — PSA SERPL DL<=0.01 NG/ML-MCNC: 0.76 NG/ML (ref 0–4.5)

## 2025-05-27 PROCEDURE — 80061 LIPID PANEL: CPT

## 2025-05-27 PROCEDURE — G0103 PSA SCREENING: HCPCS

## 2025-05-27 PROCEDURE — 36415 COLL VENOUS BLD VENIPUNCTURE: CPT

## 2025-05-27 RX ORDER — RESPIRATORY SYNCYTIAL VIRUS VACCINE 120MCG/0.5
0.5 KIT INTRAMUSCULAR ONCE
COMMUNITY
Start: 2024-12-31 | End: 2025-05-27

## 2025-05-27 RX ORDER — LISINOPRIL AND HYDROCHLOROTHIAZIDE 20; 25 MG/1; MG/1
1 TABLET ORAL DAILY
Qty: 90 TABLET | Refills: 3 | Status: SHIPPED | OUTPATIENT
Start: 2025-05-27

## 2025-05-27 RX ORDER — AMLODIPINE BESYLATE 5 MG/1
5 TABLET ORAL DAILY
Qty: 90 TABLET | Refills: 0 | Status: SHIPPED | OUTPATIENT
Start: 2025-05-27

## 2025-05-27 RX ORDER — ATENOLOL 100 MG/1
100 TABLET ORAL DAILY
Qty: 90 TABLET | Refills: 1 | Status: SHIPPED | OUTPATIENT
Start: 2025-05-27

## 2025-05-27 RX ORDER — FOLIC ACID 0.4 MG
400 TABLET ORAL DAILY
Qty: 90 TABLET | Refills: 0 | Status: SHIPPED | OUTPATIENT
Start: 2025-05-27

## 2025-05-27 RX ORDER — ASPIRIN 81 MG/1
81 TABLET ORAL DAILY
Qty: 90 TABLET | Refills: 1 | Status: SHIPPED | OUTPATIENT
Start: 2025-05-27

## 2025-05-27 RX ORDER — ALBUTEROL SULFATE 90 UG/1
1 INHALANT RESPIRATORY (INHALATION) EVERY 4 HOURS PRN
Qty: 51 G | Refills: 2 | Status: SHIPPED | OUTPATIENT
Start: 2025-05-27

## 2025-05-27 RX ORDER — SODIUM FLUORIDE 6 MG/ML
1 PASTE, DENTIFRICE DENTAL DAILY
Qty: 100 ML | Refills: 6 | Status: SHIPPED | OUTPATIENT
Start: 2025-05-27

## 2025-05-27 RX ORDER — LANOLIN ALCOHOL/MO/W.PET/CERES
100 CREAM (GRAM) TOPICAL DAILY
Qty: 90 TABLET | Refills: 1 | Status: SHIPPED | OUTPATIENT
Start: 2025-05-27

## 2025-05-27 RX ORDER — ATORVASTATIN CALCIUM 40 MG/1
40 TABLET, FILM COATED ORAL AT BEDTIME
Qty: 90 TABLET | Refills: 3 | Status: SHIPPED | OUTPATIENT
Start: 2025-05-27

## 2025-05-27 ASSESSMENT — PAIN SCALES - GENERAL: PAINLEVEL_OUTOF10: MODERATE PAIN (4)

## 2025-05-27 NOTE — PATIENT INSTRUCTIONS
Patient Education   Here is the plan from today's visit    1. Screening for cardiovascular condition  2. Hyperlipidemia LDL goal <130  I will check your cholesterol today as it has been a year. I think we will still increase the atorvastatin to 80 mg based on your risk  - Lipid panel reflex to direct LDL Non-fasting; Future  - atorvastatin (LIPITOR) 40 MG tablet; Take 1 tablet (40 mg) by mouth at bedtime.  Dispense: 90 tablet; Refill: 3    3. History of tobacco use, presenting hazards to health  Please schedule this screening  - CT Chest Lung Cancer Screen Low Dose Without; Future    4. Screen for colon cancer  Please schedule this screening  - Colonoscopy Screening  Referral; Future    5. Other emphysema (H)  Refill  - albuterol (PROAIR HFA/PROVENTIL HFA/VENTOLIN HFA) 108 (90 Base) MCG/ACT inhaler; Inhale 1 puff into the lungs every 4 hours as needed for shortness of breath, wheezing or cough.  Dispense: 51 g; Refill: 2  - Nebulizer and Supplies Order    6. Essential hypertension with goal blood pressure less than 130/80  Blood pressure looks great  - amLODIPine (NORVASC) 5 MG tablet; Take 1 tablet (5 mg) by mouth daily.  Dispense: 90 tablet; Refill: 0  - atenolol (TENORMIN) 100 MG tablet; Take 1 tablet (100 mg) by mouth daily.  Dispense: 90 tablet; Refill: 1    7. Coronary artery disease due to calcified coronary lesion  Refill. Please stop taking if you are noticing bloody or black stools  - aspirin 81 MG EC tablet; Take 1 tablet (81 mg) by mouth daily.  Dispense: 90 tablet; Refill: 1    8. Jaw pain  Refill  - diclofenac (VOLTAREN) 1 % topical gel; Apply 2 g topically 4 times daily.  Dispense: 350 g; Refill: 0  - PREVIDENT 5000 BOOSTER PLUS 1.1 % PSTE dental paste; Apply 2 mLs to affected area daily.  Dispense: 100 mL; Refill: 6    9. Essential hypertension with goal blood pressure less than 140/90  Bp looks great. Continue the good work.   - lisinopril-hydrochlorothiazide (ZESTORETIC) 20-25 MG tablet;  Take 1 tablet by mouth daily.  Dispense: 90 tablet; Refill: 3    10. Chronic obstructive pulmonary disease, unspecified COPD type (H)  I will get your nebulizer machine for you in case of needing to have medications by this route for COPD  - Nebulizer and Supplies Order    11. Encounter for Medicare annual wellness exam (Primary)  13. Encounter for screening for malignant neoplasm of prostate  We will screen your prostate today  - Prostate spec antigen screen; Future    12. Alcohol use  I recommend slowly cutting down. For now, please take these vitamins in addition to your multivitamin as most people who drink alcohol are very low here  - folic acid (FOLVITE) 400 MCG tablet; Take 1 tablet (400 mcg) by mouth daily.  Dispense: 90 tablet; Refill: 0  - thiamine (B-1) 100 MG tablet; Take 1 tablet (100 mg) by mouth daily.  Dispense: 90 tablet; Refill: 1      13. Neck mass  -Please get the Ultrasound done soon. We will give you a referral for surgery after.       Please call or return to clinic if your symptoms don't go away.    Follow up plan  No follow-ups on file.    Thank you for coming to [facility] today.  Lab Testing:  **If you had lab testing today and your results are reassuring or normal they will be mailed to you or sent through [employer] within 7 days.   **If the lab tests need quick action we will call you with the results.  **If you are having labs done on a different day, please call [phone] to schedule at [facility] or [phone] for other [facility] locations. Labs do not offer walk-in appointments.  The phone number we will call with results is # [phone] (home) . If this is not the best number please call our clinic and change the number.  Medication Refills:  If you need any refills please call your pharmacy and they will contact us.   If you need to  your refill at a new pharmacy, please contact the new pharmacy directly. The new pharmacy will help you get  your medications transferred faster.   Scheduling:  If you have any concerns about today's visit or wish to schedule another appointment please call our office during normal business hours [phone] (8-5:00 M-F). If you can no longer make a scheduled visit, please cancel via [employer] or call us to cancel.   If a referral was made to an [facility] specialty provider and you do not get a call from central scheduling, please refer to directions on your visit summary or call our office during normal business hours for assistance.   If a Mammogram was ordered for you at the [facility] call [phone] to schedule or change your appointment.  If you had an XRay/CT/Ultrasound/MRI ordered the number is [phone] to schedule or change your radiology appointment.   [facility] has limited ultrasound appointments available on Wednesdays, if you would like your ultrasound at [facility], please call [phone] to schedule.   Medical Concerns:  If you have urgent medical concerns please call [phone] at any time of the day.    Christiano Echols MD             Patient Education   Preventive Care Advice   This is general advice given by our system to help you stay healthy. However, your care team may have specific advice just for you. Please talk to your care team about your preventive care needs.  Nutrition  Eat 5 or more servings of fruits and vegetables each day.  Try wheat bread, brown rice and whole grain pasta (instead of white bread, rice, and pasta).  Get enough calcium and vitamin D. Check the label on foods and aim for 100% of the RDA (recommended daily allowance).  Lifestyle  Exercise at least 150 minutes each week  (30 minutes a day, 5 days a week).  Do muscle strengthening activities 2 days a week. These help control your weight and prevent disease.  No smoking.  Wear sunscreen to prevent skin cancer.  Have a dental exam and cleaning every 6 months.  Yearly exams  See your health care team  every year to talk about:  Any changes in your health.  Any medicines your care team has prescribed.  Preventive care, family planning, and ways to prevent chronic diseases.  Shots (vaccines)   HPV shots (up to age 26), if you've never had them before.  Hepatitis B shots (up to age 59), if you've never had them before.  COVID-19 shot: Get this shot when it's due.  Flu shot: Get a flu shot every year.  Tetanus shot: Get a tetanus shot every 10 years.  Pneumococcal, hepatitis A, and RSV shots: Ask your care team if you need these based on your risk.  Shingles shot (for age 50 and up)  General health tests  Diabetes screening:  Starting at age 35, Get screened for diabetes at least every 3 years.  If you are younger than age 35, ask your care team if you should be screened for diabetes.  Cholesterol test: At age 39, start having a cholesterol test every 5 years, or more often if advised.  Bone density scan (DEXA): At age 50, ask your care team if you should have this scan for osteoporosis (brittle bones).  Hepatitis C: Get tested at least once in your life.  STIs (sexually transmitted infections)  Before age 24: Ask your care team if you should be screened for STIs.  After age 24: Get screened for STIs if you're at risk. You are at risk for STIs (including HIV) if:  You are sexually active with more than one person.  You don't use condoms every time.  You or a partner was diagnosed with a sexually transmitted infection.  If you are at risk for HIV, ask about PrEP medicine to prevent HIV.  Get tested for HIV at least once in your life, whether you are at risk for HIV or not.  Cancer screening tests  Cervical cancer screening: If you have a cervix, begin getting regular cervical cancer screening tests starting at age 21.  Breast cancer scan (mammogram): If you've ever had breasts, begin having regular mammograms starting at age 40. This is a scan to check for breast cancer.  Colon cancer screening: It is important to  "start screening for colon cancer at age 45.  Have a colonoscopy test every 10 years (or more often if you're at risk) Or, ask your provider about stool tests like a FIT test every year or Cologuard test every 3 years.  To learn more about your testing options, visit:   .  For help making a decision, visit:   https://bit.ly/yz17327.  Prostate cancer screening test: If you have a prostate, ask your care team if a prostate cancer screening test (PSA) at age 55 is right for you.  Lung cancer screening: If you are a current or former smoker ages 50 to 80, ask your care team if ongoing lung cancer screenings are right for you.  For informational purposes only. Not to replace the advice of your health care provider. Copyright   2023 [employer]. All rights reserved. Clinically reviewed by the [facility] Transitions Program. [employer] [id number] - REV 01/24.  Learning About Being Physically Active  What is physical activity?     Being physically active means doing any kind of activity that gets your body moving.  The types of physical activity that can help you get fit and stay healthy include:  Aerobic or \"cardio\" activities. These make your heart beat faster and make you breathe harder, such as brisk walking, riding a bike, or running. They strengthen your heart and lungs and build up your endurance.  Strength training activities. These make your muscles work against, or \"resist,\" something. Examples include lifting weights or doing push-ups. These activities help tone and strengthen your muscles and bones.  Stretches. These let you move your joints and muscles through their full range of motion. Stretching helps you be more flexible.  Reaching a balance between these three types of physical activity is important because each one contributes to your overall fitness.  What are the benefits of being active?  Being active is one of the best things you can do for your health. It helps you to:  Feel stronger " "and have more energy to do all the things you like to do.  Focus better at school or work.  Feel, think, and sleep better.  Reach and stay at a healthy weight.  Lose fat and build lean muscle.  Lower your risk for serious health problems, including diabetes, heart attack, high blood pressure, and some cancers.  Keep your heart, lungs, bones, muscles, and joints strong and healthy.  How can you make being active part of your life?  Start slowly. Make it your long-term goal to get at least 30 minutes of exercise on most days of the week. Walking is a good choice. You also may want to do other activities, such as running, swimming, cycling, or playing tennis or team sports.  Pick activities that you like--ones that make your heart beat faster, your muscles stronger, and your muscles and joints more flexible. If you find more than one thing you like doing, do them all. You don't have to do the same thing every day.  Get your heart pumping every day. Any activity that makes your heart beat faster and keeps it at that rate for a while counts.  Here are some great ways to get your heart beating faster:  Go for a brisk walk, run, or hike.  Go for a swim or bike ride.  Take an online exercise class or dance.  Play a game of touch football, basketball, or soccer.  Play tennis, pickleball, or racquetball.  Climb stairs.  Even some household chores can be aerobic. Just do them at a faster pace. Raking or mowing the lawn, sweeping the garage, and vacuuming and cleaning your home all can help get your heart rate up.  Strengthen your muscles during the week. You don't have to lift heavy weights or grow big, bulky muscles to get stronger. Doing a few simple activities that make your muscles work against, or \"resist,\" something can help you get stronger. Aim for at least twice a week.  For example, you can:  Do push-ups or sit-ups, which use your own body weight as resistance.  Lift weights or dumbbells or use stretch bands at home " "or in a gym or community center.  Stretch your muscles often. Stretching will help you as you become more active. It can help you stay flexible and loosen tight muscles. It can also help improve your balance and posture and can be a great way to relax.  Be sure to stretch the muscles you'll be using when you work out. It's best to warm your muscles slightly before you stretch them. Walk or do some other light aerobic activity for a few minutes. Then start stretching.  When you stretch your muscles:  Do it slowly. Stretching is not about going fast or making sudden movements.  Don't push or bounce during a stretch.  Hold each stretch for at least 15 to 30 seconds, if you can. You should feel a stretch in the muscle, but not pain.  Breathe out as you do the stretch. Then breathe in as you hold the stretch. Don't hold your breath.  If you're worried about how more activity might affect your health, have a checkup before you start. Follow any special advice your doctor gives you for getting a smart start.  Where can you learn more?  Go to https://www.[employer].Athletes' Performance/patiented  Enter W332 in the search box to learn more about \"Learning About Being Physically Active.\"  Current as of: July 31, 2024  Content Version: 14.4    8130-8012 [employer].   Care instructions adapted under license by your healthcare professional. If you have questions about a medical condition or this instruction, always ask your healthcare professional. [employer] disclaims any warranty or liability for your use of this information.    Learning About Being Active as an Older Adult  Why is being active important as you get older?     Being active is one of the best things you can do for your health. And it's never too late to start. Being active--or getting active, if you aren't already--has definite benefits. It can:  Give you more energy,  Keep your mind sharp.  Improve balance to reduce your risk of falls.  Help you manage " chronic illness with fewer medicines.  No matter how old you are, how fit you are, or what health problems you have, there is a form of activity that will work for you. And the more physical activity you can do, the better your overall health will be.  What kinds of activity can help you stay healthy?  Being more active will make your daily activities easier. Physical activity includes planned exercise and things you do in daily life. There are four types of activity:  Aerobic.  Doing aerobic activity makes your heart and lungs strong.  Includes walking, dancing, and gardening.  Aim for at least 2  hours spread throughout the week.  It improves your energy and can help you sleep better.  Muscle-strengthening.  This type of activity can help maintain muscle and strengthen bones.  Includes climbing stairs, using resistance bands, and lifting or carrying heavy loads.  Aim for at least twice a week.  It can help protect the knees and other joints.  Stretching.  Stretching gives you better range of motion in joints and muscles.  Includes upper arm stretches, calf stretches, and gentle yoga.  Aim for at least twice a week, preferably after your muscles are warmed up from other activities.  It can help you function better in daily life.  Balancing.  This helps you stay coordinated and have good posture.  Includes heel-to-toe walking, jian chi, and certain types of yoga.  Aim for at least 3 days a week.  It can reduce your risk of falling.  Even if you have a hard time meeting the recommendations, it's better to be more active than less active. All activity done in each category counts toward your weekly total. You'd be surprised how daily things like carrying groceries, keeping up with grandchildren, and taking the stairs can add up.  What keeps you from being active?  If you've had a hard time being more active, you're not alone. Maybe you remember being able to do more. Or maybe you've never thought of yourself as being  "active. It's frustrating when you can't do the things you want. Being more active can help. What's holding you back?  Getting started.  Have a goal, but break it into easy tasks. Small steps build into big accomplishments.  Staying motivated.  If you feel like skipping your activity, remember your goal. Maybe you want to move better and stay independent. Every activity gets you one step closer.  Not feeling your best.  Start with 5 minutes of an activity you enjoy. Prove to yourself you can do it. As you get comfortable, increase your time.  You may not be where you want to be. But you're in the process of getting there. Everyone starts somewhere.  How can you find safe ways to stay active?  Talk with your doctor about any physical challenges you're facing. Make a plan with your doctor if you have a health problem or aren't sure how to get started with activity.  If you're already active, ask your doctor if there is anything you should change to stay safe as your body and health change.  If you tend to feel dizzy after you take medicine, avoid activity at that time. Try being active before you take your medicine. This will reduce your risk of falls.  If you plan to be active at home, make sure to clear your space before you get started. Remove things like TV cords, coffee tables, and throw rugs. It's safest to have plenty of space to move freely.  The key to getting more active is to take it slow and steady. Try to improve only a little bit at a time. Pick just one area to improve on at first. And if an activity hurts, stop and talk to your doctor.  Where can you learn more?  Go to https://www.Netsket.net/patiented  Enter P600 in the search box to learn more about \"Learning About Being Active as an Older Adult.\"  Current as of: July 31, 2024  Content Version: 14.4    2974-9644 [employer].   Care instructions adapted under license by your healthcare professional. If you have questions about a medical " "condition or this instruction, always ask your healthcare professional. [employer] disclaims any warranty or liability for your use of this information.    Eating Healthy Foods: Care Instructions  With every meal, you can make healthy food choices. Try to eat a variety of fruits, vegetables, whole grains, lean proteins, and low-fat dairy products. This can help you get the right balance of nutrients, including vitamins and minerals. Small changes add up over time. You can start by adding one healthy food to your meals each day.    Try to make half your plate fruits and vegetables, one-fourth whole grains, and one-fourth lean proteins. Try including dairy with your meals.   Eat more fruits and vegetables. Try to have them with most meals and snacks.   Foods for healthy eating        Fruits   These can be fresh, frozen, canned, or dried.  Try to choose whole fruit rather than fruit juice.  Eat a variety of colors.        Vegetables   These can be fresh, frozen, canned, or dried.  Beans, peas, and lentils count too.        Whole grains   Choose whole-grain breads, cereals, and noodles.  Try brown rice.        Lean proteins   These can include lean meat, poultry, fish, and eggs.  You can also have tofu, beans, peas, lentils, nuts, and seeds.        Dairy   Try milk, yogurt, and cheese.  Choose low-fat or fat-free when you can.  If you need to, use lactose-free milk or fortified plant-based milk products, such as soy milk.        Water   Drink water when you're thirsty.  Limit sugar-sweetened drinks, including soda, fruit drinks, and sports drinks.  Where can you learn more?  Go to https://www.healthwise.net/patiented  Enter T756 in the search box to learn more about \"Eating Healthy Foods: Care Instructions.\"  Current as of: October 7, 2024  Content Version: 14.4 2024-2025 [employer].   Care instructions adapted under license by your healthcare professional. If you have questions about a " medical condition or this instruction, always ask your healthcare professional. [employer] disclaims any warranty or liability for your use of this information.    Nutrition for Older Adults: Care Instructions  Overview     Good nutrition is important at any age. But it is especially important for older adults. Eating healthy foods helps keep your body strong. And it can help lower your risk for disease.  As you get older, your body needs more of certain nutrients. These include vitamin B12, calcium, and vitamin D. But it may be harder for you to get these and other important nutrients. This could be for many reasons. You may not feel as hungry as you used to. Or you could have problems with your teeth or mouth that make it hard to chew. Or you may not enjoy planning and preparing meals, especially if you live alone.  Talk with your doctor if you want help getting the most nutrition from what you eat. They may have you work with a dietitian to help you plan meals.  Follow-up care is a key part of your treatment and safety. Be sure to make and go to all appointments, and call your doctor if you are having problems. It's also a good idea to know your test results and keep a list of the medicines you take.  How can you care for yourself at home?  To stay healthy  Eat a variety of foods. The more you vary the foods you eat, the more vitamins, minerals, and other nutrients you get.  Ask your doctor if you should take a multivitamin. Choose one with about 100% of the daily value (DV) for vitamins and minerals. Do not take more than 100% of the daily value for any vitamin or mineral unless your doctor tells you to. Talk with your doctor if you are not sure which multivitamin is right for you.  Try to eat lots of fruits and vegetables. Fresh or frozen vegetables and fruits are healthy choices. Choose canned vegetables that have no salt added and fruits that are canned in their own juice or light syrup.  Include  foods that are high in vitamin B12 in your diet. Good choices are fortified breakfast cereal, nonfat or low-fat milk and other dairy products, meat, poultry, fish, and eggs.  Get enough calcium and vitamin D. Good choices include nonfat or low-fat milk, cheese, and yogurt. Other good options are tofu, orange juice with added calcium, and some leafy green vegetables, such as jacqueline greens and kale. If you don't use milk products, talk to your doctor about calcium and vitamin D supplements.  Try to eat protein foods every day. Good choices include lean meat, fish, poultry, eggs, and cheese. Other good options are cooked beans, peanut butter, and nuts and seeds.  Choose whole grains for half of the grains you eat. Look for 100% whole wheat bread, whole-grain cereals, brown rice, and other whole grains.  If you have constipation  Eat high-fiber foods every day if you can. These include fruits, vegetables, cooked dried beans, and whole grains.  Drink plenty of fluids. If you have kidney, heart, or liver disease and have to limit fluids, talk with your doctor before you increase the amount of fluids you drink.  Ask your doctor if stool softeners may help keep your bowels regular.  If you have mouth problems that make chewing hard  Pick canned or cooked fruits and vegetables. These are often softer.  Chop or shred meat, poultry, and fish. Add sauce or gravy to the meat to help keep it moist.  Pick other protein foods that are soft. These include cheese, peanut butter, cooked beans, cottage cheese, and eggs.  If you have trouble shopping for yourself  Ask a local food store to deliver groceries to your home.  Contact your local area agency on aging and ask about resources that can help.  Ask a family member or neighbor to help you.  If you have trouble preparing meals  Try easier cooking methods such as using a slow cooker or microwave oven.  Let the grocery store do some of the work for you. Look for precut, washed, and  "ready-to-eat foods.  Take part in group meal programs. You can find these through senior citizen programs.  Have meals brought to your home. Your community may offer programs that deliver meals, such as Meals on Wheels. Or you could use an online meal delivery service.  If you are able, take a cooking class.  If your appetite is poor  Try to eat meals on a regular schedule. It may help to eat smaller meals more often throughout the day.  If you can, eat some meals with other people. You could ask family or friends to eat with you. Or you could take part in group meal programs offered in your community.  Ask your doctor if your medicines could cause appetite or taste problems. If so, ask about changing medicines.  Add spices and herbs to increase the flavor of food.  If you think you are depressed, ask your doctor for help. Depression can affect your appetite. And it can make it hard to do everyday activities like grocery shopping and making meals. Treatment can help.  When should you call for help?  Watch closely for changes in your health, and be sure to contact your doctor if you have any problems.  Where can you learn more?  Go to https://www.[employer].net/patiented  Enter L643 in the search box to learn more about \"Nutrition for Older Adults: Care Instructions.\"  Current as of: October 25, 2024  Content Version: 14.4    8253-8549 [employer].   Care instructions adapted under license by your healthcare professional. If you have questions about a medical condition or this instruction, always ask your healthcare professional. [employer] disclaims any warranty or liability for your use of this information.    Hearing Loss: Care Instructions  Overview     Hearing loss is a sudden or slow decrease in how well you hear. It can range from slight to profound. Permanent hearing loss can occur with aging. It also can happen when you are exposed long-term to loud noise. Examples include listening to " loud music, riding motorcycles, or being around other loud machines.  Hearing loss can affect your work and home life. It can make you feel lonely or depressed. You may feel that you have lost your independence. But hearing aids and other devices can help you hear better and feel connected to others.  Follow-up care is a key part of your treatment and safety. Be sure to make and go to all appointments, and call your doctor if you are having problems. It's also a good idea to know your test results and keep a list of the medicines you take.  How can you care for yourself at home?  Avoid loud noises whenever possible. This helps keep your hearing from getting worse.  Always wear hearing protection around loud noises.  Wear a hearing aid as directed.  A professional can help you pick a hearing aid that will work best for you.  You can also get hearing aids over the counter for mild to moderate hearing loss.  Have hearing tests as your doctor suggests. They can show whether your hearing has changed. Your hearing aid may need to be adjusted.  Use other devices as needed. These may include:  Telephone amplifiers and hearing aids that can connect to a television, stereo, radio, or microphone.  Devices that use lights or vibrations. These alert you to the doorbell, a ringing telephone, or a baby monitor.  Television closed-captioning. This shows the words at the bottom of the screen. Most new TVs can do this.  TTY (text telephone). This lets you type messages back and forth on the telephone instead of talking or listening. These devices are also called TDD. When messages are typed on the keyboard, they are sent over the phone line to a receiving TTY. The message is shown on a monitor.  Use text messaging, social media, and email if it is hard for you to communicate by telephone.  Try to learn a listening technique called speechreading. It is not lipreading. You pay attention to people's gestures, expressions, posture, and  "tone of voice. These clues can help you understand what a person is saying. Face the person you are talking to, and have them face you. Make sure the lighting is good. You need to see the other person's face clearly.  Think about counseling if you need help to adjust to your hearing loss.  When should you call for help?  Watch closely for changes in your health, and be sure to contact your doctor if:    You think your hearing is getting worse.     You have new symptoms, such as dizziness or nausea.   Where can you learn more?  Go to https://www.[employer].net/patiented  Enter R798 in the search box to learn more about \"Hearing Loss: Care Instructions.\"  Current as of: October 27, 2024  Content Version: 14.4 2024-2025 [employer].   Care instructions adapted under license by your healthcare professional. If you have questions about a medical condition or this instruction, always ask your healthcare professional. [employer] disclaims any warranty or liability for your use of this information.    Relationships for Good Health  Relationships are important for our health and happiness. Social isolation, loneliness and lack of support are bad for your health. Studies show that loneliness can harm health and limit your life span as much as high blood pressure and smoking.   Take some time to reflect on your relationships. Then answer these questions:  Are there people in your life that cause you stress or drain your energy? What can you do to set limits?  ________________________________________________________________________________________________________________________________________________________________________________________________________________________________________________________________________________________________________________________________________________  Who do you enjoy spending time with? Who can you go to for " support?  ________________________________________________________________________________________________________________________________________________________________________________________________________________________________________________________________________________________________________________________________________________  What can you do to improve your relationships with others?  __________________________________________________________________________________________________________________________________________________________________________________________________________________  ______________________________________________________________________________________________________________________________  What do you like most about your relationships with others?  ________________________________________________________________________________________________________________________________________________________________________________________________________________________________________________________________________________________________________________________________________________  My goal: ______________________________________________________________________  I will: ______________________________________________________________________________________________________________________________________________________________________________________________    For informational purposes only. Not to replace the advice of your health care provider. Copyright   2018 [facility]. All rights reserved. Clinically reviewed by [facility] [employer]  Team. SM[employer] 477158 - Rev 06/24.  9 Ways to Cut Back on Drinking  Maybe you've found yourself drinking more alcohol than you'd prefer. If you want to cut back, here are some ideas to try.    Think before you drink.  Do you really want a drink, or is it just a habit? If you're used to having a drink at a certain  "time, try doing something else then.     Look for substitutes.  Find some no-alcohol drinks that you enjoy, like flavored seltzer water, tea with honey, or tonic with a slice of lime. Or try alcohol-free beer or \"virgin\" cocktails (without the alcohol).     Drink more water.  Use water to quench your thirst. Drink a glass of water before you have any alcohol. Have another glass along with every drink or between drinks.     Shrink your drink.  For example, have a bottle of beer instead of a pint. Use a smaller glass for wine. Choose drinks with lower alcohol content (ABV%). Or use less liquor and more mixer in cocktails.     Slow down.  It's easy to drink quickly and without thinking about it. Pay attention, and make each drink last longer.     Do the math.  Total up how much you spend on alcohol each month. How much is that a year? If you cut back, what could you do with the money you save?     Take a break.  Choose a day or two each week when you won't drink at all. Notice how you feel on those days, physically and emotionally. How did you sleep? Do you feel better? Over time, add more break days.     Count calories.  Would you like to lose some weight? For some people that's a good motivator for cutting back. Figure out how many calories are in each drink. How many does that add up to in a day? In a week? In a month?     Practice saying no.  Be ready when someone offers you a drink. Try: \"Thanks, I've had enough.\" Or \"Thanks, but I'm cutting back.\" Or \"No, thanks. I feel better when I drink less.\"   Current as of: August 20, 2024  Content Version: 14.4    9080-3646 [employer].   Care instructions adapted under license by your healthcare professional. If you have questions about a medical condition or this instruction, always ask your healthcare professional. [employer] disclaims any warranty or liability for your use of this information.  Learning About Alcohol Use Disorder  What is alcohol " use disorder?  Alcohol use disorder means that a person drinks alcohol even though it causes harm to themselves or others. It can range from mild to severe. The more symptoms of this disorder you have, the more severe it may be. People who have it may find it hard to control their use of alcohol.  People who have this disorder may argue with others about how much they're drinking. Their job may be affected because of drinking. They may drink when it's dangerous or illegal, such as when they drive. They also may have a strong need, or craving, to drink. They may feel like they must drink just to get by. Their drinking may increase their risk of getting hurt or being in a car crash.  Over time, drinking too much alcohol may cause health problems. These may include high blood pressure, liver problems, or problems with digestion.  What are the symptoms?  Maybe you've wondered about your alcohol habits or how to tell if your drinking is becoming a problem.  Here are some of the symptoms of alcohol use disorder. You may have it if you have two or more of the following symptoms:  You drink larger amounts of alcohol than you ever meant to. Or you've been drinking for a longer time than you ever meant to.  You can't cut down or control your use. Or you constantly wish you could cut down.  You spend a lot of time getting or drinking alcohol or recovering from its effects.  You have strong cravings for alcohol.  You can no longer do your main jobs at work, at school, or at home.  You keep drinking alcohol, even though your use hurts your relationships.  You have stopped doing important activities because of your alcohol use.  You drink alcohol in situations where doing so is dangerous.  You keep drinking alcohol even though you know it's causing health problems.  You need more and more alcohol to get the same effect, or you get less effect from the same amount over time. This is called tolerance.  You have uncomfortable  "symptoms when you stop drinking alcohol or use less. This is called withdrawal.  Alcohol use disorder can range from mild to severe. The more symptoms you have, the more severe the disorder may be.  You might not realize that your drinking is a problem. You might not drink large amounts when you drink. Or you might go for days or weeks between drinking episodes. But even if you don't drink very often, your drinking could still be harmful and put you at risk.  How is alcohol use disorder treated?  Getting help is up to you. But you don't have to do it alone. There are many people and kinds of treatments that can help.  Treatment for alcohol use disorder can include:  Group therapy, one or more types of counseling, and alcohol education.  Medicines that help to:  Reduce withdrawal symptoms and help you safely stop drinking.  Reduce cravings for alcohol.  Support groups. These groups include Alcoholics Anonymous and [employer] (Self-Management and Recovery Training).  Some people are able to stop or cut back on drinking with help from a counselor. People who have moderate to severe alcohol use disorder may need medical treatment. They may need to stay in a hospital or treatment center.  You may have a treatment team to help you. This team may include a psychologist or psychiatrist, counselors, doctors, social workers, nurses, and a . A  helps plan and manage your treatment.  Follow-up care is a key part of your treatment and safety. Be sure to make and go to all appointments, and call your doctor if you are having problems. It's also a good idea to know your test results and keep a list of the medicines you take.  Where can you learn more?  Go to https://www.healthApplied Logic [employer]net/patiented  Enter H758 in the search box to learn more about \"Learning About Alcohol Use Disorder.\"  Current as of: August 20, 2024  Content Version: 14.4    8247-3833 [facility] [employer] [facility].   Care instructions adapted " under license by your healthcare professional. If you have questions about a medical condition or this instruction, always ask your healthcare professional. [employer] disclaims any warranty or liability for your use of this information.    Substance Use Disorder: Care Instructions  Overview     You can improve your life and health by stopping your use of alcohol or drugs. When you don't drink or use drugs, you may feel and sleep better. You may get along better with your family, friends, and coworkers. There are medicines and programs that can help with substance use disorder.  How can you care for yourself at home?  Here are some ways to help you stay sober and prevent relapse.  If you have been given medicine to help keep you sober or reduce your cravings, be sure to take it exactly as prescribed.  Talk to your doctor about programs that can help you stop using drugs or drinking alcohol.  Do not keep alcohol or drugs in your home.  Plan ahead. Think about what you'll say if other people ask you to drink or use drugs. Try not to spend time with people who drink or use drugs.  Use the time and money spent on drinking or drugs to do something that's important to you.  Preventing a relapse  Have a plan to deal with relapse. Learn to recognize changes in your thinking that lead you to drink or use drugs. Get help before you start to drink or use drugs again.  Try to stay away from situations, friends, or places that may lead you to drink or use drugs.  If you feel the need to drink alcohol or use drugs again, seek help right away. Call a trusted friend or family member. Some people get support from organizations such as Narcotics Anonymous or [employer] or from treatment facilities.  If you relapse, get help as soon as you can. Some people make a plan with another person that outlines what they want that person to do for them if they relapse. The plan usually includes how to handle the relapse and who  to notify in case of relapse.  Don't give up. Remember that a relapse doesn't mean that you have failed. Use the experience to learn the triggers that lead you to drink or use drugs. Then quit again. Recovery is a lifelong process. Many people have several relapses before they are able to quit for good.  Follow-up care is a key part of your treatment and safety. Be sure to make and go to all appointments, and call your doctor if you are having problems. It's also a good idea to know your test results and keep a list of the medicines you take.  When should you call for help?   Call 911  anytime you think you may need emergency care. For example, call if you or someone else:    Has overdosed or has withdrawal signs. Be sure to tell the emergency workers that you are or someone else is using or trying to quit using drugs. Overdose or withdrawal signs may include:  Losing consciousness.  Seizure.  Seeing or hearing things that aren't there (hallucinations).     Is thinking or talking about suicide or harming others.   Where to get help 24 hours a day, 7 days a week   If you or someone you know talks about suicide, self-harm, a mental health crisis, a substance use crisis, or any other kind of emotional distress, get help right away. You can:    Call the Suicide and Crisis Lifeline at 98.     Call 9-491-516-UGFN (1-636.180.7701).     Text HOME to 270837 to access the Crisis Text Line.   Consider saving these numbers in your phone.  Go to [employer].org for more information or to chat online.  Call your doctor now or seek immediate medical care if:    You are having withdrawal symptoms. These may include nausea or vomiting, sweating, shakiness, and anxiety.   Watch closely for changes in your health, and be sure to contact your doctor if:    You have a relapse.     You need more help or support to stop.   Where can you learn more?  Go to https://www.healthwise.net/patiented  Enter H573 in the search box to learn more  "about \"Substance Use Disorder: Care Instructions.\"  Current as of: August 20, 2024  Content Version: 14.4 2024-2025 [employer]   Care instructions adapted under license by your healthcare professional. If you have questions about a medical condition or this instruction, always ask your healthcare professional. [employer] disclaims any warranty or liability for your use of this information.     Learning About Risk for Heart Attack and Stroke (01:56)  Your health professional recommends that you watch this short online health video.  Learn what raises your risk for having a heart attack or stroke and how you can lower your risk.   Purpose: Understand the risk of having a heart attack or stroke and what you can do about it.  Goal: Understand the risk of having a heart attack or stroke and what you can do about it.    Watch: Scan the QR code or visit the link to view video       [email or web address] [email or web address] Songi.se/r/L4mrrhmbxusrc  Current as of: July 31, 2024  Content Version: 14.4 2006-2025 [employer]   Care instructions adapted under license by your healthcare professional. If you have questions about a medical condition or this instruction, always ask your healthcare professional. [employer] disclaims any warranty or liability for your use of this information.    Learning About Being Active as an Older Adult  Why is being active important as you get older?     Being active is one of the best things you can do for your health. And it's never too late to start. Being active--or getting active, if you aren't already--has definite benefits. It can:  Give you more energy,  Keep your mind sharp.  Improve balance to reduce your risk of falls.  Help you manage chronic illness with fewer medicines.  No matter how old you are, how fit you are, or what health problems you have, there is a form of activity that will work for you. And the more physical activity you can do, the better your " overall health will be.  What kinds of activity can help you stay healthy?  Being more active will make your daily activities easier. Physical activity includes planned exercise and things you do in daily life. There are four types of activity:  Aerobic.  Doing aerobic activity makes your heart and lungs strong.  Includes walking, dancing, and gardening.  Aim for at least 2  hours spread throughout the week.  It improves your energy and can help you sleep better.  Muscle-strengthening.  This type of activity can help maintain muscle and strengthen bones.  Includes climbing stairs, using resistance bands, and lifting or carrying heavy loads.  Aim for at least twice a week.  It can help protect the knees and other joints.  Stretching.  Stretching gives you better range of motion in joints and muscles.  Includes upper arm stretches, calf stretches, and gentle yoga.  Aim for at least twice a week, preferably after your muscles are warmed up from other activities.  It can help you function better in daily life.  Balancing.  This helps you stay coordinated and have good posture.  Includes heel-to-toe walking, jian chi, and certain types of yoga.  Aim for at least 3 days a week.  It can reduce your risk of falling.  Even if you have a hard time meeting the recommendations, it's better to be more active than less active. All activity done in each category counts toward your weekly total. You'd be surprised how daily things like carrying groceries, keeping up with grandchildren, and taking the stairs can add up.  What keeps you from being active?  If you've had a hard time being more active, you're not alone. Maybe you remember being able to do more. Or maybe you've never thought of yourself as being active. It's frustrating when you can't do the things you want. Being more active can help. What's holding you back?  Getting started.  Have a goal, but break it into easy tasks. Small steps build into big  "accomplishments.  Staying motivated.  If you feel like skipping your activity, remember your goal. Maybe you want to move better and stay independent. Every activity gets you one step closer.  Not feeling your best.  Start with 5 minutes of an activity you enjoy. Prove to yourself you can do it. As you get comfortable, increase your time.  You may not be where you want to be. But you're in the process of getting there. Everyone starts somewhere.  How can you find safe ways to stay active?  Talk with your doctor about any physical challenges you're facing. Make a plan with your doctor if you have a health problem or aren't sure how to get started with activity.  If you're already active, ask your doctor if there is anything you should change to stay safe as your body and health change.  If you tend to feel dizzy after you take medicine, avoid activity at that time. Try being active before you take your medicine. This will reduce your risk of falls.  If you plan to be active at home, make sure to clear your space before you get started. Remove things like TV cords, coffee tables, and throw rugs. It's safest to have plenty of space to move freely.  The key to getting more active is to take it slow and steady. Try to improve only a little bit at a time. Pick just one area to improve on at first. And if an activity hurts, stop and talk to your doctor.  Where can you learn more?  Go to https://www.[employer]ullet.net/patiented  Enter P600 in the search box to learn more about \"Learning About Being Active as an Older Adult.\"  Current as of: July 31, 2024  Content Version: 14.4    0929-5091 [employer].   Care instructions adapted under license by your healthcare professional. If you have questions about a medical condition or this instruction, always ask your healthcare professional. [employer] disclaims any warranty or liability for your use of this information.    High Cholesterol: Care " "Instructions  Overview     Cholesterol is a type of fat in your blood. It is needed for many body functions, such as making new cells. Cholesterol is made by your body. It also comes from food you eat. High cholesterol means that you have too much of the fat in your blood. This raises your risk of a heart attack and stroke.  LDL and HDL are part of your total cholesterol. LDL is the \"bad\" cholesterol. High LDL can raise your risk for coronary artery disease, heart attack, and stroke. HDL is the \"good\" cholesterol. It helps clear bad cholesterol from the body. High HDL is linked with a lower risk of coronary artery disease, heart attack, and stroke.  Your cholesterol levels help your doctor find out your risk for having a heart attack or stroke. You and your doctor can talk about whether you need to lower your risk and what treatment is best for you.  Treatment options include a heart-healthy lifestyle and medicine. Both options can help lower your cholesterol and your risk. The way you choose to lower your risk will depend on how high your risk is for heart attack and stroke. It will also depend on how you feel about taking medicines.  Follow-up care is a key part of your treatment and safety. Be sure to make and go to all appointments, and call your doctor if you are having problems. It's also a good idea to know your test results and keep a list of the medicines you take.  How can you care for yourself at home?  Eat heart-healthy foods.  Eat fruits, vegetables, whole grains, beans, and other high-fiber foods.  Eat lean proteins, such as seafood, lean meats, beans, nuts, and soy products.  Eat healthy fats, such as canola and olive oil.  Choose foods that are low in saturated fat.  Limit sodium and alcohol.  Limit drinks and foods with added sugar.  Be physically active. Try to do moderate activity at least 2  hours a week. Or try to do vigorous activity at least 1  hours a week. You may want to walk or try other " "activities, such as running, swimming, cycling, or playing tennis or team sports.  Stay at a healthy weight or lose weight by making the changes in eating and physical activity listed above. Losing just a small amount of weight, even 5 to 10 pounds, can help reduce your risk for having a heart attack or stroke.  Do not smoke.  Manage other health problems. These include diabetes and high blood pressure. If you think you may have a problem with alcohol or drug use, talk to your doctor.  If you take medicine, take it exactly as prescribed. Call your doctor if you think you are having a problem with your medicine.  Check with your doctor or pharmacist before you use any other medicines, including over-the-counter medicines. Make sure your doctor knows all of the medicines, vitamins, herbal products, and supplements you take. Taking some medicines together can cause problems.  When should you call for help?  Watch closely for changes in your health, and be sure to contact your doctor if:    You need help making lifestyle changes.     You have questions about your medicine.   Where can you learn more?  Go to https://www.[employer].net/patiented  Enter I865 in the search box to learn more about \"High Cholesterol: Care Instructions.\"  Current as of: July 31, 2024  Content Version: 14.4    2964-8952 [employer].   Care instructions adapted under license by your healthcare professional. If you have questions about a medical condition or this instruction, always ask your healthcare professional. [employer] disclaims any warranty or liability for your use of this information.    Skin Cancer Prevention: Care Instructions  Your Care Instructions     Skin cancer is the abnormal growth of cells in the skin. It usually appears as a growth that changes in color, shape, or size. This can be a sore that does not heal or a change in a mole. Skin cancer is almost always curable when found early and treated. So it " "is important to see your doctor if you have any of these changes in your skin.  Skin cancer is the most common type of cancer. It often appears on areas of the body that have been exposed to the sun, such as the head, face, neck, back, chest, or shoulders.  Follow-up care is a key part of your treatment and safety. Be sure to make and go to all appointments, and call your doctor if you are having problems. It's also a good idea to know your test results and keep a list of the medicines you take.  How can you care for yourself at home?  Wear a wide-brimmed hat and long sleeves and pants if you are going to be outdoors for a long time.  Avoid the sun between 10 a.m. and 4 p.m., which is the peak time for UV rays.  Wear sunscreen on exposed skin. Make sure to use a broad-spectrum sunscreen that has a sun protection factor (SPF) of 30 or higher. Use it every day, even when it is cloudy.  Do not use tanning booths or sunlamps.  Use lip balm or cream that has sun protection factor (SPF) to protect your lips from getting sunburned.  Wear sunglasses that block UV rays.  When should you call for help?  Watch closely for changes in your health, and be sure to contact your doctor if:    You see a change in your skin, such as a growth or mole that:  Grows bigger. This may happen very slowly.  Changes color.  Changes shape.  Starts to bleed easily.     You have swollen glands in your armpits, groin, or neck.     You do not get better as expected.   Where can you learn more?  Go to https://www.[employer]net/patiented  Enter P392 in the search box to learn more about \"Skin Cancer Prevention: Care Instructions.\"  Current as of: December 4, 2024  Content Version: 14.4 2024-2025 [employer].   Care instructions adapted under license by your healthcare professional. If you have questions about a medical condition or this instruction, always ask your healthcare professional. [employer] disclaims any warranty " or liability for your use of this information.    Preventing Falls: Care Instructions  Injuries and health problems such as trouble walking or poor eyesight can increase your risk of falling. So can some medicines. But there are things you can do to help prevent falls. You can exercise to get stronger. You can also arrange your home to make it safer.    Talk to your doctor about the medicines you take. Ask if any of them increase the risk of falls and whether they can be changed or stopped.   Try to exercise regularly. It can help improve your strength and balance. This can help lower your risk of falling.         Practice fall safety and prevention.   Wear low-heeled shoes that fit well and give your feet good support. Talk to your doctor if you have foot problems that make this hard.  Carry a cellphone or wear a medical alert device that you can use to call for help.  Use stepladders instead of chairs to reach high objects. Don't climb if you're at risk for falls. Ask for help, if needed.  Wear the correct eyeglasses, if you need them.        Make your home safer.   Remove rugs, cords, clutter, and furniture from walkways.  Keep your house well lit. Use night-lights in hallways and bathrooms.  Install and use sturdy handrails on stairways.  Wear nonskid footwear, even inside. Don't walk barefoot or in socks without shoes.        Be safe outside.   Use handrails, curb cuts, and ramps whenever possible.  Keep your hands free by using a shoulder bag or backpack.  Try to walk in well-lit areas. Watch out for uneven ground, changes in pavement, and debris.  Be careful in the winter. Walk on the grass or gravel when sidewalks are slippery. Use de-icer on steps and walkways. Add non-slip devices to shoes.    Put grab bars and nonskid mats in your shower or tub and near the toilet. Try to use a shower chair or bath bench when bathing.   Get into a tub or shower by putting in your weaker leg first. Get out with your strong  "side first. Have a phone or medical alert device in the bathroom with you.   Where can you learn more?  Go to https://www.[employer].net/patiented  Enter G117 in the search box to learn more about \"Preventing Falls: Care Instructions.\"  Current as of: July 31, 2024  Content Version: 14.4    4871-2242 [employer].   Care instructions adapted under license by your healthcare professional. If you have questions about a medical condition or this instruction, always ask your healthcare professional. [employer] disclaims any warranty or liability for your use of this information.       "

## 2025-05-27 NOTE — PROGRESS NOTES
Preventive Care Visit  [facility] GRACE Echols MD, Family Medicine  May 27, 2025  {Provider  Link to SmartSet :737035}    {PROVIDER CHARTING PREFERENCE:736565}    Subjective   Wilian is a 70 year old, presenting for the following:  Physical (Large nodule on the right side of neck has been there for 30 years. Now that he is retired time to get it removed. Has not seen a surgeon.)        5/27/2025     2:03 PM   Additional Questions   Roomed by Lyubov   Accompanied by self         5/27/2025    Information    services provided? No          HPI    Wilian reports feeling well.   -Using CPAP machine for FLASH, at least 4 hours per night or longer which is more than minimum  -Sleep doctor follow up in a couple weeks  -Going to sleep 5-6 am and waking 12 pm  -Currently retired. Worked in [employer] at age 20.     For alcohol use:   Drinks: around 4 beers per day over 6 hour period. Always rolling rock. No liquor.   Tobacco: stopped 2011 and not recurrence. 1-2 packs over period of 20 years. Quit 2011    Hypertension  -takes amlodipine 5 mg daily, atenolol 100 mg and lisinopril/hydrochlorothiazide 20-25 mg daily, no missed doses    Cholesterol   -Takes atovastatin 40 mg daily, no missed doses  -Aspirin 81 mg daily    Smoking  shortness of breath  -Albuterol used rarely, not in a couple weeks from last use.   -No hospitalizations for COPD exacerbations    Has neck mass  -       Advance Care Planning  {The storyboard will display whether the patient has ACP docs on file. Hover over the Code section in the storyboard to access the ACP documents. :473341}  Discussed advance care planning with patient; informed AVS has link to [facility]oring Choices.        5/24/2025   General Health   How would you rate your overall physical health? (!) FAIR   Feel stress (tense, anxious, or unable to sleep) Not at all         5/24/2025   Nutrition   Diet: Regular (no restrictions)         5/24/2025   Exercise    Days per week of moderate/strenous exercise 0 days   Average minutes spent exercising at this level 10 min   (!) EXERCISE CONCERN      5/24/2025   Social Factors   Frequency of gathering with friends or relatives Never   Worry food won't last until get money to buy more No   Food not last or not have enough money for food? No   Do you have housing? (Housing is defined as stable permanent housing and does not include staying outside in a car, in a tent, in an abandoned building, in an overnight shelter, or couch-surfing.) Yes   Are you worried about losing your housing? No   Lack of transportation? No   Unable to get utilities (heat,electricity)? No   (!) SOCIAL CONNECTIONS CONCERN      5/24/2025   Fall Risk   Fallen 2 or more times in the past year? No   Trouble with walking or balance? No          5/24/2025   Activities of Daily Living- Home Safety   Needs help with the following daily activites None of the above   Safety concerns in the home None of the above         5/24/2025   Dental   Dentist two times every year? Yes         5/24/2025   Hearing Screening   Hearing concerns? (!) TROUBLE FOLLOWING DIALOGUE IN THE THEATHER.         5/24/2025   Driving Risk Screening   Patient/family members have concerns about driving No         5/24/2025   General Alertness/Fatigue Screening   Have you been more tired than usual lately? No         5/24/2025   Urinary Incontinence Screening   Bothered by leaking urine in past 6 months No         Today's PHQ-2 Score:       5/26/2025     5:51 PM   PHQ-2 ( 1999 [employer])   Q1: Little interest or pleasure in doing things 0   Q2: Feeling down, depressed or hopeless 0   PHQ-2 Score 0    Q1: Little interest or pleasure in doing things Not at all   Q2: Feeling down, depressed or hopeless Not at all   PHQ-2 Score 0       Patient-reported           5/24/2025   Substance Use   Alcohol more than 3/day or more than 7/wk Yes   How often do you have a drink containing alcohol 4 or more times a  week   How many alcohol drinks on typical day 3 or 4   How often do you have 5+ drinks at one occasion Never   Audit 2/3 Score 1   How often not able to stop drinking once started Never   How often failed to do what normally expected Never   How often needed first drink in am after a heavy drinking session Never   How often feeling of guilt or remorse after drinking Never   How often unable to remember what happened the night before Never   Have you or someone else been injured because of your drinking No   Has anyone been concerned or suggested you cut down on drinking No   TOTAL SCORE - AUDIT 5   Do you have a current opioid prescription? No   How severe/bad is pain from 1 to 10? 3/10   Do you use any other substances recreationally? (!) CANNABIS PRODUCTS     Social History     Tobacco Use     Smoking status: Former     Current packs/day: 0.00     Average packs/day: 2.0 packs/day for 35.0 years (70.0 ttl pk-yrs)     Types: Cigarettes     Start date:      Quit date:      Years since quittin.4     Passive exposure: Past     Smokeless tobacco: Never   Vaping Use     Vaping status: Never Used   Substance Use Topics     Alcohol use: Not Currently     Comment: daily     Drug use: Never     {Provider  If there are gaps in the social history shown above, please follow the link to update and then refresh the note Link to Social and Substance History :400159}  Last PSA:   PSA   Date Value Ref Range Status   02/15/2021 0.57 0 - 4 ug/L Final     Comment:     Assay Method:  Chemiluminescence using Siemens Vista analyzer     ASCVD Risk   The 10-year ASCVD risk score (Nomi CURRY, et al., 2019) is: 15.5%    Values used to calculate the score:      Age: 70 years      Sex: Male      Is Non- : No      Diabetic: No      Tobacco smoker: No      Systolic Blood Pressure: 120 mmHg      Is BP treated: Yes      HDL Cholesterol: 54 mg/dL      Total Cholesterol: 192 mg/dL    {Link to Fracture  Risk Assessment Tool (Optional):532659}    {Provider  REQUIRED FOR AWV Use the storyboard to review patient history, after sections have been marked as reviewed, refresh note to capture documentation:116234}  {Provider   REQUIRED AWV use this link to review and update sexual activity history  after section has been marked as reviewed, refresh note to capture documentation:132677}  Reviewed and updated as needed this visit by Provider                    No past medical history on file.  No past surgical history on file.  Current providers sharing in care for this patient include:  Patient Care Team:  Christiano Echols MD as PCP - General (Family Medicine)  Elie Kerns MD as MD (Colon and Rectal Surgery)  Otto Block AuD as Audiologist (Audiology)  Christiano Echols MD as Assigned PCP    The following health maintenance items are reviewed in Epic and correct as of today:  Health Maintenance   Topic Date Due     ADVANCE CARE PLANNING  Never done     COPD ACTION PLAN  Never done     ZOSTER VACCINE (1 of 2) Never done     MEDICARE ANNUAL WELLNESS VISIT  03/19/2022     LIPID  05/17/2025     LUNG CANCER SCREENING  05/30/2025     COLORECTAL CANCER SCREENING  06/12/2025     DTAP/TDAP/TD VACCINE (2 - Td or Tdap) 09/01/2025     COVID-19 VACCINE (4 - 2024-25 season) 09/14/2025     BMP  03/17/2026     FALL RISK ASSESSMENT  05/27/2026     DIABETES SCREENING  03/17/2028     SPIROMETRY  Completed     HEPATITIS C SCREENING  Completed     PHQ-2 (once per calendar year)  Completed     INFLUENZA VACCINE  Completed     PNEUMOCOCCAL VACCINE 50+ YEARS  Completed     RSV VACCINE  Completed     AORTIC ANEURYSM SCREENING (SYSTEM ASSIGNED)  Completed     HPV VACCINE  Aged Out     MENINGITIS VACCINE  Aged Out         Review of Systems  Constitutional, neuro, ENT, endocrine, pulmonary, cardiac, gastrointestinal, genitourinary, musculoskeletal, integument and psychiatric systems are negative, except as otherwise noted.     Objective   "  Exam  /68   Pulse 58   Temp 98  F (36.7  C) (Oral)   Resp 15   Ht 1.702 m (5' 7\")   Wt 101.7 kg (224 lb 1.6 oz)   SpO2 95%   BMI 35.10 kg/m     Estimated body mass index is 35.1 kg/m  as calculated from the following:    Height as of this encounter: 1.702 m (5' 7\").    Weight as of this encounter: 101.7 kg (224 lb 1.6 oz).    Physical Exam  {MALE - complete :695997}  {Provider  The Mini-Cog is incomplete, use link to complete and refresh note Link to Mini-Cog :666887}       No data to display              {A Mini-Cog total score of 0-2 suggests the possibility of dementia, score of 3-5 suggests no dementia:750614}         Signed Electronically by: Christiano Echols MD  {Email feedback regarding this note to [email or web address]   :494701}  DME (Durable Medical Equipment) Orders and Documentation  Orders Placed This Encounter   Procedures     Nebulizer and Supplies Order      The patient was assessed and it was determined the patient is in need of the following listed DME Supplies/Equipment. Please complete supporting documentation below to demonstrate medical necessity.         " happened the night before Never   Have you or someone else been injured because of your drinking No   Has anyone been concerned or suggested you cut down on drinking No   TOTAL SCORE - AUDIT 5   Do you have a current opioid prescription? No   How severe/bad is pain from 1 to 10? 3/10   Do you use any other substances recreationally? (!) CANNABIS PRODUCTS     Social History     Tobacco Use    Smoking status: Former     Current packs/day: 0.00     Average packs/day: 2.0 packs/day for 35.0 years (70.0 ttl pk-yrs)     Types: Cigarettes     Start date:      Quit date:      Years since quittin.4     Passive exposure: Past    Smokeless tobacco: Never   Vaping Use    Vaping status: Never Used   Substance Use Topics    Alcohol use: Not Currently     Comment: daily    Drug use: Never     Last PSA:   PSA   Date Value Ref Range Status   02/15/2021 0.57 0 - 4 ug/L Final     Comment:     Assay Method:  Chemiluminescence using Siemens Vista analyzer     Prostate Specific Antigen Screen   Date Value Ref Range Status   2025 0.76 0.00 - 4.50 ng/mL Final     ASCVD Risk   The 10-year ASCVD risk score (Nomi CURRY, et al., 2019) is: 15.1%    Values used to calculate the score:      Age: 70 years      Sex: Male      Is Non- : No      Diabetic: No      Tobacco smoker: No      Systolic Blood Pressure: 120 mmHg      Is BP treated: Yes      HDL Cholesterol: 48 mg/dL      Total Cholesterol: 166 mg/dL    Fracture Risk Assessment Tool  Link to Frax Calculator  Use the information below to complete the Frax calculator  : 1955  Sex: male  Weight (kg): 101.7 kg (actual weight)  Height (cm): 170.2 cm  Previous Fragility Fracture:  No  History of parent with fractured hip:  No  Current Smoking:  No  Patient has been on glucocorticoids for more than 3 months (5mg/day or more): No  Rheumatoid Arthritis on Problem List:  No  Secondary Osteoporosis on Problem List:  No  Consumes 3 or more units  "of alcohol per day: Yes  Femoral Neck BMD (g/cm2)        6/1/2025   FRAX Calculated Score- 10yr Fracture Probability (%)   Major Osteoporotic- without BMD 5.4 %   Hip Fracture- without BMD 1 %           Reviewed and updated as needed this visit by Provider   Tobacco  Allergies  Meds  Problems  Med Hx  Surg Hx  Fam Hx            History reviewed. No pertinent past medical history.  History reviewed. No pertinent surgical history.  Current providers sharing in care for this patient include:  Patient Care Team:  Christiano Echols MD as PCP - General (Family Medicine)  Elie Kerns MD as MD (Colon and Rectal Surgery)  Otto Block AuD as Audiologist (Audiology)  Christiano Echols MD as Assigned PCP    The following health maintenance items are reviewed in Epic and correct as of today:  Health Maintenance   Topic Date Due    COPD ACTION PLAN  Never done    ZOSTER VACCINE (1 of 2) Never done    LUNG CANCER SCREENING  05/30/2025    COLORECTAL CANCER SCREENING  06/12/2025    DTAP/TDAP/TD VACCINE (2 - Td or Tdap) 09/01/2025    COVID-19 VACCINE (4 - 2024-25 season) 09/14/2025    BMP  03/17/2026    MEDICARE ANNUAL WELLNESS VISIT  05/27/2026    LIPID  05/27/2026    FALL RISK ASSESSMENT  05/27/2026    DIABETES SCREENING  03/17/2028    ADVANCE CARE PLANNING  06/01/2030    SPIROMETRY  Completed    HEPATITIS C SCREENING  Completed    PHQ-2 (once per calendar year)  Completed    INFLUENZA VACCINE  Completed    PNEUMOCOCCAL VACCINE 50+ YEARS  Completed    RSV VACCINE  Completed    AORTIC ANEURYSM SCREENING (SYSTEM ASSIGNED)  Completed    HPV VACCINE  Aged Out    MENINGITIS VACCINE  Aged Out         Review of Systems  Constitutional, neuro, ENT, endocrine, pulmonary, cardiac, gastrointestinal, genitourinary, musculoskeletal, integument and psychiatric systems are negative, except as otherwise noted.     Objective    Exam  /68   Pulse 58   Temp 98  F (36.7  C) (Oral)   Resp 15   Ht 1.702 m (5' 7\")   Wt 101.7 kg " "(224 lb 1.6 oz)   SpO2 95%   BMI 35.10 kg/m     Estimated body mass index is 35.1 kg/m  as calculated from the following:    Height as of this encounter: 1.702 m (5' 7\").    Weight as of this encounter: 101.7 kg (224 lb 1.6 oz).    Physical Exam  GENERAL: alert, WNWD, and no distress  EYES: Eyes grossly normal to inspection, PERRL, EOMI and conjunctivae and sclerae normal  HENT: NCAT, ear canals and TM's normal, nose and mouth without ulcers or lesions, opens jaw fully  NECK: no cervical adenopathy, no scars, overlying right clavicle and base of neck, has a soft 6 \" x 2\" cigar shaped mass with longest aspect running along length of clavicle.   RESP: Normal effort, lungs clear to auscultation - no rales, rhonchi or wheezes  CV: regular rate and rhythm, normal S1 S2, no murmur, no peripheral edema  ABDOMEN: soft, nontender, no hepatosplenomegaly, no masses and bowel sounds normal  MS: no gross musculoskeletal defects noted, no edema  SKIN: no suspicious lesions or rashes  NEURO: Normal strength, sensation and tone, mentation intact, symmetric facies and speech normal. CN II - XII grossly intact.   PSYCH: mentation appears normal, affect normal/bright         5/27/2025   Mini Cog   Clock Draw Score 2 Normal    2 Normal   3 Item Recall 3 objects recalled    3 objects recalled   Mini Cog Total Score 5    5       Multiple values from one day are sorted in reverse-chronological order              Signed Electronically by: Christiano Echols MD    DME (Durable Medical Equipment) Orders and Documentation  Orders Placed This Encounter   Procedures    Nebulizer and Supplies Order      The patient was assessed and it was determined the patient is in need of the following listed DME Supplies/Equipment. Please complete supporting documentation below to demonstrate medical necessity.         "

## 2025-05-28 LAB
CHOLEST SERPL-MCNC: 166 MG/DL
FASTING STATUS PATIENT QL REPORTED: ABNORMAL
HDLC SERPL-MCNC: 48 MG/DL
LDLC SERPL CALC-MCNC: 75 MG/DL
NONHDLC SERPL-MCNC: 118 MG/DL
TRIGL SERPL-MCNC: 216 MG/DL

## 2025-05-29 NOTE — PROGRESS NOTES
Preceptor Attestation:    I discussed the patient with the resident and evaluated the patient in person on 5/27. I have verified the content of the note, which accurately reflects my assessment of the patient and the plan of care.   Supervising Physician:  Ray Machado MD.

## 2025-06-03 ENCOUNTER — TELEPHONE (OUTPATIENT)
Dept: GASTROENTEROLOGY | Facility: CLINIC | Age: 70
End: 2025-06-03
Payer: MEDICARE

## 2025-06-03 ENCOUNTER — RESULTS FOLLOW-UP (OUTPATIENT)
Dept: FAMILY MEDICINE | Facility: CLINIC | Age: 70
End: 2025-06-03
Payer: MEDICARE

## 2025-06-03 DIAGNOSIS — Z12.11 ENCOUNTER FOR SCREENING COLONOSCOPY: Primary | ICD-10-CM

## 2025-06-03 RX ORDER — BISACODYL 5 MG/1
TABLET, DELAYED RELEASE ORAL
Qty: 4 TABLET | Refills: 0 | Status: SHIPPED | OUTPATIENT
Start: 2025-06-03

## 2025-06-03 NOTE — TELEPHONE ENCOUNTER
"Pre assessment completed for upcoming procedure.      Procedure details:    Patient scheduled for Colonoscopy on 6/19/25.     Arrival time: 1200. Procedure time 1300    Facility location: [facility]; [address] [place] [address] [place] [address]. Check in location: 1st [facility].     Sedation type: MAC - per order: \"Hx of chemical dependency\"    Pre op exam needed? Yes. Pre op exam is scheduled on 6/6/25 with Ema Flanagan PA-C in PAC.    Indication for procedure: Screening    Designated  policy reviewed. Instructed to have someone stay 24 hours post procedure.       Chart review:    Electronic implanted devices? No    Recent diagnosis of diverticulitis within the last 6 weeks?  No      Medication review:    Diabetic? No    Anticoagulants? No    Weight loss medication/injectable? No.    Other medication HOLDING recommendations:  Cannabis/Marijuana: Stop night before procedure.  Ferrous sulfate (iron supplements): HOLD 7 days before procedure.  (In multivitamin per patient)      Prep for procedure:    Bowel prep recommendation: Standard Golytely.  Bowel prep sent to [facility] #[id number] [place], [place] - [address] AT [address]  Due to: patient request/preference - patient reports he did Golytely prep last time and familiar with that prep.    Patient was advised to  bowel prep scripts (1 container of Golytely and 4 Dulcolax tablets) from [facility] #[id number] [place], [place] - [address] AT [address].    Procedure information and instructions sent via "[employer].    Patient declined to review procedure prep instructions on the phone. Patient confirmed he has access to [employer] and will review the instructions on there. Instructed patient to review the procedure prep instructions at least 7 days prior to procedure due to necessary dietary modifications. NPO instructions reviewed.    Patient was instructed to call if any " questions or concerns arise.      Patient verbalized understanding and had no questions or concerns at this time.      Jessi Santos RN  Endoscopy Procedure Pre Assessment   [phone] option 3

## 2025-06-04 NOTE — TELEPHONE ENCOUNTER
FUTURE VISIT INFORMATION      SURGERY INFORMATION:  Date: 6/19/25  Location: [facility]  Surgeon:  Dr. Danuta Dasilva  Anesthesia Type:  MAC  Procedure: Colonoscopy, screening  Consult: N/A    RECORDS REQUESTED FROM:       Primary Care Provider: Dr. Christiano Echols // [facility]    Pertinent Medical History: tobacco use disorder, emphysema, CAD, hypertension, BPH, alcohol use disorder and tubular adenoma of colon, CKD, HLD, COPD, FLASH    Most recent PFT's: 5/20/24    Most recent Sleep Study:  2/13/25

## 2025-06-06 ENCOUNTER — PRE VISIT (OUTPATIENT)
Dept: SURGERY | Facility: CLINIC | Age: 70
End: 2025-06-06

## 2025-06-06 ENCOUNTER — ANCILLARY PROCEDURE (OUTPATIENT)
Dept: CT IMAGING | Facility: CLINIC | Age: 70
End: 2025-06-06
Attending: FAMILY MEDICINE
Payer: MEDICARE

## 2025-06-06 ENCOUNTER — ANESTHESIA EVENT (OUTPATIENT)
Dept: GASTROENTEROLOGY | Facility: CLINIC | Age: 70
End: 2025-06-06
Payer: MEDICARE

## 2025-06-06 DIAGNOSIS — R91.8 PULMONARY NODULES: ICD-10-CM

## 2025-06-06 DIAGNOSIS — Z87.891 HISTORY OF TOBACCO USE, PRESENTING HAZARDS TO HEALTH: ICD-10-CM

## 2025-06-06 PROCEDURE — 71271 CT THORAX LUNG CANCER SCR C-: CPT | Mod: GC | Performed by: RADIOLOGY

## 2025-06-10 ENCOUNTER — TELEPHONE (OUTPATIENT)
Dept: FAMILY MEDICINE | Facility: CLINIC | Age: 70
End: 2025-06-10

## 2025-06-10 ASSESSMENT — SLEEP AND FATIGUE QUESTIONNAIRES
HOW LIKELY ARE YOU TO NOD OFF OR FALL ASLEEP WHILE LYING DOWN TO REST IN THE AFTERNOON WHEN CIRCUMSTANCES PERMIT: HIGH CHANCE OF DOZING
HOW LIKELY ARE YOU TO NOD OFF OR FALL ASLEEP WHILE SITTING INACTIVE IN A PUBLIC PLACE: WOULD NEVER DOZE
HOW LIKELY ARE YOU TO NOD OFF OR FALL ASLEEP WHEN YOU ARE A PASSENGER IN A CAR FOR AN HOUR WITHOUT A BREAK: WOULD NEVER DOZE
HOW LIKELY ARE YOU TO NOD OFF OR FALL ASLEEP WHILE SITTING AND TALKING TO SOMEONE: WOULD NEVER DOZE
HOW LIKELY ARE YOU TO NOD OFF OR FALL ASLEEP WHILE SITTING QUIETLY AFTER LUNCH WITHOUT ALCOHOL: WOULD NEVER DOZE
HOW LIKELY ARE YOU TO NOD OFF OR FALL ASLEEP IN A CAR, WHILE STOPPED FOR A FEW MINUTES IN TRAFFIC: WOULD NEVER DOZE
HOW LIKELY ARE YOU TO NOD OFF OR FALL ASLEEP WHILE WATCHING TV: SLIGHT CHANCE OF DOZING
HOW LIKELY ARE YOU TO NOD OFF OR FALL ASLEEP WHILE SITTING AND READING: WOULD NEVER DOZE

## 2025-06-11 ENCOUNTER — ANCILLARY PROCEDURE (OUTPATIENT)
Dept: ULTRASOUND IMAGING | Facility: CLINIC | Age: 70
End: 2025-06-11
Attending: FAMILY MEDICINE
Payer: MEDICARE

## 2025-06-11 DIAGNOSIS — R22.1 NECK MASS: ICD-10-CM

## 2025-06-11 PROCEDURE — 76536 US EXAM OF HEAD AND NECK: CPT | Performed by: RADIOLOGY

## 2025-06-12 ENCOUNTER — OFFICE VISIT (OUTPATIENT)
Dept: SLEEP MEDICINE | Facility: CLINIC | Age: 70
End: 2025-06-12
Payer: MEDICARE

## 2025-06-12 VITALS
HEART RATE: 58 BPM | OXYGEN SATURATION: 94 % | SYSTOLIC BLOOD PRESSURE: 128 MMHG | WEIGHT: 223 LBS | BODY MASS INDEX: 35 KG/M2 | DIASTOLIC BLOOD PRESSURE: 70 MMHG | HEIGHT: 67 IN

## 2025-06-12 DIAGNOSIS — G47.33 OSA (OBSTRUCTIVE SLEEP APNEA): Primary | ICD-10-CM

## 2025-06-12 NOTE — PROGRESS NOTES
Sleep Apnea - Follow-up Visit:    Impression/Plan:  1. FLASH (obstructive sleep apnea) (Primary)     Severe Sleep apnea. Tolerating PAP well. Daytime symptoms are improved.  The patient is meeting compliance requirements at this time.  He continues to use and benefit from PAP therapy.    A review of his APAP download data over the last 30 days shows excellent use and compliance and severe FLASH that is well-controlled on current pressure settings.  Of note, there is elevated airleak seen on download data.    Continue current plan of care. No new orders placed at this time.     Wilian Ness will follow up with Dr. Myers, primary sleep provider, in about 1 year(s).     24 minutes spent with patient, all of which were spent face-to-face counseling, consulting, chart review/documentation, and coordinating plan of care on the date of the encounter.      FARHAD Dinh CNP  Sleep Medicine      CC:  Christiano Echols,         History of Present Illness:  Chief Complaint   Patient presents with    CPAP Follow Up     CPAP follow up        Wilian Ness is a 70-year-old male with a PMH significant for HTN, COPD, HLD, BPH, chronic back pain, and obesity who presents for compliance follow-up of their severe sleep apnea, managed with CPAP.  He was last seen in an office visit on 2/27/2025 with Dr. Myers in follow-up to review sleep study results and a subsequent order for PAP therapy was placed at that time.  Patient needs compliant usage and sleep follow up for Medicare compliance. He reports improvements in sleep with better quality sleep and dreaming as well as sleeping through night.  His sleep pattern is late and he does nap maybe once a day with his CPAP which has been helpful.    Previous Study Results:   2/13/2025 [facility] Sleep Study (218.0 lbs) - AHI 84.6, RDI 90.9, Supine .0, REM AHI 68.3, Low O2% 65.0%, Time Spent <=88% 40.6, Time Spent <=89% 50.5. Treatment was titrated to a pressure of CPAP 8 with an  AHI 18.9. Time spent in REM supine at this pressure was - minutes.     DME: [facility]    Do you use a CPAP Machine at home: (Patient-Rptd) Yes  Overall, on a scale of 0-10 how would you rate your CPAP (0 poor, 10 great):      What type of mask do you use: (Patient-Rptd) Nasal Pillow  Is your mask comfortable: (Patient-Rptd) Yes  If not, why:      Is your mask leaking: (Patient-Rptd) Yes  If yes, where do you feel it: (Patient-Rptd) nose  How many night per week does the mask leak (0-7): (Patient-Rptd) 2    Do you notice snoring with mask on: (Patient-Rptd) No  Do you notice gasping arousals with mask on: (Patient-Rptd) No  Are you having significant oral or nasal dryness: (Patient-Rptd) Yes  Is the pressure setting comfortable: (Patient-Rptd) Yes  If not, why:      What is your typical bedtime: (Patient-Rptd) 6 am  How long does it take you to go to sleep on PAP therapy: (Patient-Rptd) 15 min  What time do you typically get out of bed for the day: (Patient-Rptd) noon  How many hours on average per night are you using PAP therapy:  5 hours 45 mins  How many hours are you sleeping per night: (Patient-Rptd) 6 to 7  Do you feel well rested in the morning: (Patient-Rptd) Yes      ResMed AirSense 11 (set up on 3/10/2025 at [facility])  Auto-PAP 10.0 - 20.0 cmH2O 30 day usage data:  5/13/25 - 6/11/25  90% of days with > 4 hours of use. 0/30 days with no use.   Average use 344 minutes per day.   95%ile Leak 33.1 L/min.   CPAP 95% pressure 19 cm.   AHI 1.33 events per hour.        EPWORTH SLEEPINESS SCALE         6/10/2025     4:02 PM    [place] Sleepiness Scale ( ELIZA Magana  [id number]<br>ESS - [place]/English - Final version - 21 Nov 07 - [facility] [employer].)   Sitting and reading Would never doze   Watching TV Slight chance of dozing   Sitting, inactive in a public place (e.g. a theatre or a meeting) Would never doze   As a passenger in a car for an hour without a break Would never doze   Lying down to rest in the  afternoon when circumstances permit High chance of dozing   Sitting and talking to someone Would never doze   Sitting quietly after a lunch without alcohol Would never doze   In a car, while stopped for a few minutes in traffic Would never doze   [place] Score (MC) 4   [place] Score (Sleep) 4        Patient-reported       INSOMNIA SEVERITY INDEX (LISA)          6/10/2025     3:55 PM   Insomnia Severity Index (LISA)   Difficulty falling asleep 0   Difficulty staying asleep 0   Problems waking up too early 0   How SATISFIED/DISSATISFIED are you with your CURRENT sleep pattern? 0   How NOTICEABLE to others do you think your sleep problem is in terms of impairing the quality of your life? 0   How WORRIED/DISTRESSED are you about your current sleep problem? 0   To what extent do you consider your sleep problem to INTERFERE with your daily functioning (e.g. daytime fatigue, mood, ability to function at work/daily chores, concentration, memory, mood, etc.) CURRENTLY? 0   LISA Total Score 0        Patient-reported       Guidelines for Scoring/Interpretation:  Total score categories:  0-7 = No clinically significant insomnia   8-14 = Subthreshold insomnia   15-21 = Clinical insomnia (moderate severity)  22-28 = Clinical insomnia (severe)  Used via courtesy of www.[employer].va.gov with permission from Te Alegre PhD., [facility]      Past medical/surgical history, family history, social history, medications and allergies were reviewed.        Problem List:  Patient Active Problem List    Diagnosis Date Noted    Tubular adenoma of colon 11/06/2012     Priority: High     positive iFIT test 5/2011;   colonoscopy 8.26.12 - 3 tubular adenomas ( 2 Ascending colon - 7mm, 14mm) (1 Transverse colon - 4 mm).   Colonoscopy 9/9/13 - 12mm polyp. - repeat 1 year  Colonoscopy 9/18/15 - 10mm polyp - repeat 2 years  Declines colonoscopy 10/2018 - FIT negative  Colonoscopy 6/2020 - no polyps - repeat 5-10 years      Former smoker  04/20/2022     Priority: Medium    Benign prostatic hyperplasia with nocturia 10/10/2018     Priority: Medium    Neck mass 09/01/2015     Priority: Medium     Likely lipoma on R neck      Health care home, active care coordination      Priority: Medium     Tier 1      Essential hypertension with goal blood pressure less than 140/90      Priority: Medium    Hyperlipidemia LDL goal <130      Priority: Medium      Current Outpatient Medications   Medication Sig Dispense Refill    acetaminophen (TYLENOL) 500 MG tablet Take 1-2 tablets (500-1,000 mg) by mouth every 6 hours as needed for mild pain. 90 tablet 3    albuterol (PROAIR HFA/PROVENTIL HFA/VENTOLIN HFA) 108 (90 Base) MCG/ACT inhaler Inhale 1 puff into the lungs every 4 hours as needed for shortness of breath, wheezing or cough. 51 g 2    amLODIPine (NORVASC) 5 MG tablet Take 1 tablet (5 mg) by mouth daily. (Patient taking differently: Take 5 mg by mouth every evening.) 90 tablet 0    aspirin 81 MG EC tablet Take 1 tablet (81 mg) by mouth daily. (Patient taking differently: Take 81 mg by mouth daily at 2 pm.) 90 tablet 1    atenolol (TENORMIN) 100 MG tablet Take 1 tablet (100 mg) by mouth daily. (Patient taking differently: Take 100 mg by mouth daily at 2 pm.) 90 tablet 1    atorvastatin (LIPITOR) 40 MG tablet Take 1 tablet (40 mg) by mouth at bedtime. 90 tablet 3    bisacodyl (DULCOLAX) 5 MG EC tablet Take 2 tablets at 3 pm the day before your procedure. If your procedure is before 11 am, take 2 additional tablets at 11 pm. If your procedure is after 11 am, take 2 additional tablets at 6 am. For additional instructions refer to your colonoscopy prep instructions. 4 tablet 0    calcium carbonate-vitamin D (CALTRATE) 600-10 MG-MCG per tablet Take 1 tablet by mouth daily at 2 pm.      diclofenac (VOLTAREN) 1 % topical gel Apply 2 g topically 4 times daily. 350 g 0    ibuprofen (ADVIL/MOTRIN) 200 MG tablet Take 200 mg by mouth every 4 hours as needed for pain.       "lisinopril-hydrochlorothiazide (ZESTORETIC) 20-25 MG tablet Take 1 tablet by mouth daily. (Patient taking differently: Take 1 tablet by mouth daily at 2 pm.) 90 tablet 3    multivitamin w/minerals (MULTI-VITAMIN) tablet Take 1 tablet by mouth twice a week. TUE and SAT      polyethylene glycol (GOLYTELY) 236 g suspension The night before the exam at 6 pm drink an 8-ounce glass every 15 minutes until the jug is half empty. If you arrive before 11 AM: Drink the other half of the Golytely jug at 11 PM night before procedure. If you arrive after 11 AM: Drink the other half of the Golytely jug at 6 AM day of procedure. For additional instructions refer to your colonoscopy prep instructions. 4000 mL 0    PREVIDENT 5000 BOOSTER PLUS 1.1 % PSTE dental paste Apply 2 mLs to affected area daily. 100 mL 6    thiamine (B-1) 100 MG tablet Take 1 tablet (100 mg) by mouth daily. 90 tablet 1     No current facility-administered medications for this visit.     /70   Pulse 58   Ht 1.702 m (5' 7.01\")   Wt 101.2 kg (223 lb)   SpO2 94%   BMI 34.92 kg/m        This note was written with the assistance of the Dragon voice-dictation technology software. The final document, although reviewed, may contain errors. For corrections, please contact the office.    "

## 2025-06-12 NOTE — PATIENT INSTRUCTIONS
"MY INFORMATION ON SLEEP APNEA-  Wilian Ness    DOCTOR : FARHAD Dinh CNP  SLEEP CENTER :      MY CONTACT NUMBER:   [facility]  [phone]  [facility]   [phone]  [facility]   [phone]      [facility]  [phone]    DME:  [facility] [place] [address], [address]  [place] [address]  Phone: [phone]    Hours:  Mon - Fri: 8:00 a.m. - 4:30 p.m.  Sat: Closed  Sun: Closed        Key Points:  1. What is Obstructive Sleep Apnea (FLASH)? FLASH is the most common type of sleep apnea. Apnea literally means, \"without breath.\" It is characterized by repetitive pauses in breathing, despite continued effort to breathe, and is usually associated with a reduction in blood oxygen saturation. Apneas can last 10 to over 60 seconds. It is caused by narrowing or collapse of the upper airway as muscles relax during sleep.   2. What are the consequences of FLASH? Symptoms include: daytime sleepiness- possibly increasing the risk of falling asleep while driving, unrefreshing/restless sleep, snoring, insomnia, waking frequently to urinate, waking with heartburn or reflux, reduced concentration and memory, and morning headaches. Other health consequences may include development of high blood pressure. Untreated FLASH also can contribute to heart disease, stroke and diabetes.   3. What are the treatment options? In most situations, sleep apnea is a lifelong disease that must be managed with daily therapy. Continuous Positive Airway (CPAP) is the most reliable treatment. A mouthguard to hold your jaw forward is usually the next most reliable option. Other options include postioning devices (to keep you off your back), nasal valves, tongue retaining device, weight loss, surgery. There is more detail about these options toward the end of this document.  4. What are the most important things to " remember about using CPAP?     WHERE CAN I FIND MORE INFORMATION?    American Academy of Sleep Medicine Patient information on sleep disorders:  http://yoursleep.aasmnet.org    CPAP -  WHY AND HOW?                 Continuous positive airway pressure, or CPAP, is the most effective treatment for obstructive sleep apnea. It works by blowing room air, through a mask, to hold your throat open. A decision to use CPAP is a major step forward in the pursuit of a healthier life. The successful use of CPAP will help you breathe easier, sleep better and live healthier. Using CPAP can be a positive experience if you keep these morales points in mind:  Commitment  CPAP is not a quick fix for your problem. It involves a long-term commitment to improve your sleep and your health.    Communication  Stay in close communication with both your sleep doctor and your CPAP supplier. Ask lots of questions and seek help when you need it.    Consistency  Use CPAP all night, every night and for every nap. You will receive the maximum health benefits from CPAP when you use it every time that you sleep. This will also make it easier for your body to adjust to the treatment.    Correction  The first machine and mask that you try may not be the best ones for you. Work with your sleep doctor and your CPAP supplier to make corrections to your equipment selection. Ask about trying a different type of machine or mask if you have ongoing problems. Make sure that your mask is a good fit and learn to use your equipment properly.    Challenge  Tell a family member or close friend to ask you each morning if you used your CPAP the previous night. Have someone to challenge you to give it your best effort.    Connection   Your adjustment to CPAP will be easier if you are able to connect with others who use the same treatment. Ask your sleep doctor if there is a support group in your area for people who have sleep apnea, or look for one on the  "Internet.  Comfort   Increase your level of comfort by using a saline spray, decongestant or heated humidifier if CPAP irritates your nose, mouth or throat. Use your unit's \"ramp\" setting to slowly get used to the air pressure level. There may be soft pads you can buy that will fit over your mask straps. Look on www.CPAP.com for accessories that can help make CPAP use more comfortable.  Cleaning   Clean your mask, tubing and headgear on a regular basis. Put this time in your schedule so that you don't forget to do it. Check and replace the filters for your CPAP unit and humidifier.    Completion   Although you are never finished with CPAP therapy, you should reward yourself by celebrating the completion of your first month of treatment. Expect this first month to be your hardest period of adjustment. It will involve some trial and error as you find the machine, mask and pressure settings that are right for you.    Continuation  After your first month of treatment, continue to make a daily commitment to use your CPAP all night, every night and for every nap.    CPAP-Tips to starting with success:  Begin using your CPAP for short periods of time during the day while you watch TV or read.    Use CPAP every night and for every nap. Using it less often reduces the health benefits and makes it harder for your body to get used to it.    Newer CPAP models are virtually silent; however, if you find the sound of your CPAP machine to be bothersome, place the unit under your bed to dampen the sound.     Make small adjustments to your mask, tubing, straps and headgear until you get the right fit. Tightening the mask may actually worsen the leak.  If it leaves significant marks on your face or irritates the bridge of your nose, it may not be the best mask for you.  Speak with the person who supplied the mask and consider trying other masks. Insurances will allow you to try different masks during the first month of starting CPAP.  " Insurance also covers a new mask, hose and filter about every 6 months.    Use a saline nasal spray to ease mild nasal congestion. Neti-Pot or saline nasal rinses may also help. Nasal gel sprays can help reduce nasal dryness.  Biotene mouthwash can be helpful to protect your teeth if you experience frequent dry mouth.  Dry mouth may be a sign of air escaping out of your mouth or out of the mask in the case of a full face mask.  Speak with your provider if you expect that is the case.     Take a nasal decongestant to relieve more severe nasal or sinus congestion.  Do not use Afrin (oxymetazoline) nasal spray more than 3 days in a row.  Speak with your sleep doctor if your nasal congestion is chronic.    Use a heated humidifier that fits your CPAP model to enhance your breathing comfort. Adjust the heat setting up if you get a dry nose or throat, down if you get condensation in the hose or mask.  Position the CPAP lower than you so that any condensation in the hose drains back into the machine rather than towards the mask.    Try a system that uses nasal pillows if traditional masks give you problems.    Clean your mask, tubing and headgear once a week. Make sure the equipment dries fully.    Regularly check and replace the filters for your CPAP unit and humidifier.    Work closely with your sleep provider and your CPAP supplier to make sure that you have the machine, mask and air pressure setting that works best for you. It is better to stop using it and call your provider to solve problems than to lay awake all night frustrated with the device.  Weight Loss:    Weight loss decreases severity of sleep apnea in most people with obesity. For those with mild obesity who have developed snoring with weight gain, even 15-30 pound weight loss can improve and occasionally eliminate sleep apnea.  Structured and life-long dietary and health habits are necessary to lose weight and keep healthier weight levels.     Though there  are significant health benefits from weight loss, long-term weight loss is very difficult to achieve- studies show success with dietary management in less than 10% of people. In addition, substantial weight loss may require years of dietary control and may be difficult if patients have severe obesity. In these cases, surgical management may be considered.    If you are interested in methods for weight loss, you should review the options discussed at the National Institutes of Health patient information sites:     http://win.niddk.nih.gov/publications/index.htm  http:/www.health.nih.gov/topic/WeightLossDieting    Bariatric programs offer counseling in all methods of weight loss:    Http:/www.[facility]edic[facility].org/Specialties/WeightLossSurgeryandMedicalMgmt/htm    Your BMI is Body mass index is 34.92 kg/m .    Weight management plan: Patient was referred to their PCP to discuss a diet and exercise plan.    Body mass index (BMI) is one way to tell whether you are at a healthy weight, overweight, or obese. It measures your weight in relation to your height.  A BMI of 18.5 to 24.9 is in the healthy range. A person with a BMI of 25 to 29.9 is considered overweight, and someone with a BMI of 30 or greater is considered obese.  Another way to find out if you are at risk for health problems caused by overweight and obesity is to measure your waist. If you are a woman and your waist is more than 35 inches, or if you are a man and your waist is more than 40 inches, your risk of disease may be higher.  More than two-thirds of American adults are considered overweight or obese. Being overweight or obese increases the risk for further weight gain.  Excess weight may lead to heart disease and diabetes. Creating and following plans for healthy eating and physical activity may help you improve your health.    Methods for maintaining or losing weight.    Weight control is part of healthy lifestyle and includes exercise, emotional  health, and healthy eating habits.  Careful eating habits lifelong is the mainstay of weight control.  Though there are significant health benefits from weight loss, long-term weight loss with diet alone may be very difficult to achieve- studies show long-term success with dietary management in less than 10% of people. Attaining a healthy weight may be especially difficult to achieve in those with severe obesity. In some cases, medications, devices and surgical management might be considered.    What can you do?    If you are overweight or obese and are interested in methods for weight loss, you should discuss this with your provider. In addition, we recommend that you review healthy life styles and methods for weight loss available through the National Institutes of Health patient information sites:     http://win.niddk.nih.gov/publications/index.htm

## 2025-06-18 RX ORDER — ONDANSETRON 2 MG/ML
4 INJECTION INTRAMUSCULAR; INTRAVENOUS
Status: CANCELLED | OUTPATIENT
Start: 2025-06-18

## 2025-06-18 RX ORDER — LIDOCAINE 40 MG/G
CREAM TOPICAL
Status: CANCELLED | OUTPATIENT
Start: 2025-06-18

## 2025-06-19 ENCOUNTER — PATIENT OUTREACH (OUTPATIENT)
Dept: GASTROENTEROLOGY | Facility: CLINIC | Age: 70
End: 2025-06-19

## 2025-06-19 ENCOUNTER — ANESTHESIA (OUTPATIENT)
Dept: GASTROENTEROLOGY | Facility: CLINIC | Age: 70
End: 2025-06-19
Payer: MEDICARE

## 2025-06-19 ENCOUNTER — HOSPITAL ENCOUNTER (OUTPATIENT)
Facility: CLINIC | Age: 70
Discharge: HOME | End: 2025-06-19
Attending: COLON & RECTAL SURGERY | Admitting: COLON & RECTAL SURGERY
Payer: MEDICARE

## 2025-06-19 VITALS
WEIGHT: 225 LBS | SYSTOLIC BLOOD PRESSURE: 126 MMHG | DIASTOLIC BLOOD PRESSURE: 73 MMHG | HEART RATE: 56 BPM | RESPIRATION RATE: 10 BRPM | BODY MASS INDEX: 35.31 KG/M2 | OXYGEN SATURATION: 100 % | HEIGHT: 67 IN

## 2025-06-19 LAB — COLONOSCOPY: NORMAL

## 2025-06-19 PROCEDURE — 370N000017 HC ANESTHESIA TECHNICAL FEE, PER MIN: Performed by: COLON & RECTAL SURGERY

## 2025-06-19 PROCEDURE — 999N000010 HC STATISTIC ANES STAT CODE-CRNA PER MINUTE: Performed by: COLON & RECTAL SURGERY

## 2025-06-19 PROCEDURE — G0121 COLON CA SCRN NOT HI RSK IND: HCPCS | Performed by: COLON & RECTAL SURGERY

## 2025-06-19 PROCEDURE — 250N000011 HC RX IP 250 OP 636: Performed by: NURSE ANESTHETIST, CERTIFIED REGISTERED

## 2025-06-19 PROCEDURE — 250N000009 HC RX 250: Performed by: NURSE ANESTHETIST, CERTIFIED REGISTERED

## 2025-06-19 PROCEDURE — 250N000013 HC RX MED GY IP 250 OP 250 PS 637: Performed by: COLON & RECTAL SURGERY

## 2025-06-19 PROCEDURE — G0105 COLORECTAL SCRN; HI RISK IND: HCPCS | Performed by: COLON & RECTAL SURGERY

## 2025-06-19 PROCEDURE — 258N000003 HC RX IP 258 OP 636: Performed by: NURSE ANESTHETIST, CERTIFIED REGISTERED

## 2025-06-19 RX ORDER — SODIUM CHLORIDE, SODIUM LACTATE, POTASSIUM CHLORIDE, CALCIUM CHLORIDE 600; 310; 30; 20 MG/100ML; MG/100ML; MG/100ML; MG/100ML
INJECTION, SOLUTION INTRAVENOUS CONTINUOUS PRN
Status: DISCONTINUED | OUTPATIENT
Start: 2025-06-19 | End: 2025-06-19

## 2025-06-19 RX ORDER — SIMETHICONE 40MG/0.6ML
SUSPENSION, DROPS(FINAL DOSAGE FORM)(ML) ORAL PRN
Status: DISCONTINUED | OUTPATIENT
Start: 2025-06-19 | End: 2025-06-19 | Stop reason: HOSPADM

## 2025-06-19 RX ORDER — PROPOFOL 10 MG/ML
INJECTION, EMULSION INTRAVENOUS CONTINUOUS PRN
Status: DISCONTINUED | OUTPATIENT
Start: 2025-06-19 | End: 2025-06-19

## 2025-06-19 RX ORDER — LIDOCAINE HYDROCHLORIDE 20 MG/ML
INJECTION, SOLUTION INFILTRATION; PERINEURAL PRN
Status: DISCONTINUED | OUTPATIENT
Start: 2025-06-19 | End: 2025-06-19

## 2025-06-19 RX ADMIN — PHENYLEPHRINE HYDROCHLORIDE 100 MCG: 10 INJECTION INTRAVENOUS at 13:39

## 2025-06-19 RX ADMIN — LIDOCAINE HYDROCHLORIDE 60 MG: 20 INJECTION, SOLUTION INFILTRATION; PERINEURAL at 13:32

## 2025-06-19 RX ADMIN — SODIUM CHLORIDE, SODIUM LACTATE, POTASSIUM CHLORIDE, AND CALCIUM CHLORIDE: .6; .31; .03; .02 INJECTION, SOLUTION INTRAVENOUS at 13:29

## 2025-06-19 RX ADMIN — PROPOFOL 200 MCG/KG/MIN: 10 INJECTION, EMULSION INTRAVENOUS at 13:29

## 2025-06-19 ASSESSMENT — ACTIVITIES OF DAILY LIVING (ADL)
ADLS_ACUITY_SCORE: 41

## 2025-06-19 ASSESSMENT — ENCOUNTER SYMPTOMS: SEIZURES: 0

## 2025-06-19 ASSESSMENT — COPD QUESTIONNAIRES
COPD: 1
CAT_SEVERITY: MILD

## 2025-06-19 ASSESSMENT — LIFESTYLE VARIABLES: TOBACCO_USE: 1

## 2025-06-19 NOTE — ANESTHESIA PREPROCEDURE EVALUATION
Anesthesia Pre-Procedure Evaluation    Patient: Wilian Ness   MRN: [id number] : 1955          Procedure : Procedure(s):  Colonoscopy, Screening         Past Medical History:   Diagnosis Date    Alcohol use     BPH (benign prostatic hyperplasia)     COPD (chronic obstructive pulmonary disease) (H)     Coronary artery calcification seen on CT scan     HLD (hyperlipidemia)     HTN (hypertension)     Obesity     FLASH (obstructive sleep apnea)     Pulmonary nodules     TMJ (temporomandibular joint syndrome)       Past Surgical History:   Procedure Laterality Date    COLONOSCOPY      COLONOSCOPY        Allergies   Allergen Reactions    Seasonal Allergies      Ragweed, in the fall      Social History     Tobacco Use    Smoking status: Former     Current packs/day: 0.00     Average packs/day: 2.0 packs/day for 35.0 years (70.0 ttl pk-yrs)     Types: Cigarettes     Start date:      Quit date:      Years since quittin.4     Passive exposure: Current    Smokeless tobacco: Never   Substance Use Topics    Alcohol use: Yes     Comment: average 3 beers a night      Wt Readings from Last 1 Encounters:   25 101.2 kg (223 lb)        Anesthesia Evaluation   Pt has had prior anesthetic. Type: MAC.    No history of anesthetic complications       ROS/MED HX  ENT/Pulmonary: Comment: Pulmonary nodules    TMJ - left sided clicking. No locking     (+) sleep apnea, uses CPAP,              tobacco use, Past use,        mild,  COPD,              Neurologic:  - neg neurologic ROS  (-) no seizures, no CVA and no TIA   Cardiovascular: Comment: CT coronary calcifications     (+) Dyslipidemia hypertension- -   -  - -                                   (-) CAD   METS/Exercise Tolerance: >4 METS    Hematologic:  - neg hematologic  ROS     Musculoskeletal: Comment: Back pain       GI/Hepatic: Comment: History of colon polyps   (-) GERD   Renal/Genitourinary:     (+)        BPH,      Endo:     (+)               Obesity,    (-)  "Type II DM   Psychiatric/Substance Use:     (+)   alcohol abuse  Recreational drug usage: Cannabis.    Infectious Disease:  - neg infectious disease ROS     Malignancy:  - neg malignancy ROS     Other:              Physical Exam  Airway  Mallampati: II  TM distance: >3 FB  Neck ROM: full  Mouth opening: >= 4 cm    Cardiovascular - normal exam Comments: CT coronary calcifications   Dental   (+) Minor Abnormalities - some fillings, tiny chips      Pulmonary - normal exam      Neurological - normal exam  He appears awake, alert and oriented x3.    Other Findings       OUTSIDE LABS:  CBC: No results found for: \"WBC\", \"HGB\", \"HCT\", \"PLT\"  BMP:   Lab Results   Component Value Date     03/17/2025     05/17/2024    POTASSIUM 4.2 03/17/2025    POTASSIUM 4.4 05/17/2024    CHLORIDE 95 (L) 03/17/2025    CHLORIDE 97 (L) 05/17/2024    CO2 29 03/17/2025    CO2 32 (H) 05/17/2024    BUN 10.6 03/17/2025    BUN 6.3 (L) 05/17/2024    CR 0.98 03/17/2025    CR 0.94 05/17/2024     (H) 03/17/2025     (H) 05/17/2024     COAGS: No results found for: \"PTT\", \"INR\", \"FIBR\"  POC: No results found for: \"BGM\", \"HCG\", \"HCGS\"  HEPATIC:   Lab Results   Component Value Date    ALBUMIN 4.1 03/17/2025    PROTTOTAL 7.0 03/17/2025    ALT 23 03/17/2025    AST 30 03/17/2025    ALKPHOS 83 03/17/2025    BILITOTAL 1.3 (H) 03/17/2025     OTHER:   Lab Results   Component Value Date    PH 7.42 02/12/2025    A1C 5.4 04/18/2023    BERNARDO 9.7 03/17/2025    TSH 1.95 02/14/2025    T4 1.15 02/14/2025       Anesthesia Plan    ASA Status:  3      NPO Status: NPO Appropriate   Anesthesia Type: MAC.   Techniques and Equipment:       - Monitoring Plan: standard ASA monitoring     Consents    Anesthesia Plan(s) and associated risks, benefits, and realistic alternatives discussed. Questions answered and patient/representative(s) expressed understanding.     - Discussed:     - Discussed with:  Patient               Postoperative Care     " "    Comments:                   Sofie Chadwick MD    I have reviewed the pertinent notes and labs in the chart from the past 30 days and (re)examined the patient.  Any updates or changes from those notes are reflected in this note.    Clinically Significant Risk Factors Present on Admission                 # Drug Induced Platelet Defect: home medication list includes an antiplatelet medication   # Hypertension: Noted on problem list           # Obesity: Estimated body mass index is 34.92 kg/m  as calculated from the following:    Height as of 6/12/25: 1.702 m (5' 7.01\").    Weight as of 6/12/25: 101.2 kg (223 lb).                    "

## 2025-06-19 NOTE — ANESTHESIA CARE TRANSFER NOTE
Patient: Wilian Ness    Procedure: Procedure(s):  Colonoscopy, Screening       Diagnosis: Screen for colon cancer [Z12.11]  Diagnosis Additional Information: No value filed.    Anesthesia Type:   MAC     Note:    Oropharynx: oropharynx clear of all foreign objects and spontaneously breathing  Level of Consciousness: drowsy  Oxygen Supplementation: room air    Independent Airway: airway patency satisfactory and stable  Dentition: dentition unchanged  Vital Signs Stable: post-procedure vital signs reviewed and stable  Report to RN Given: handoff report given  Patient transferred to: PACU    Handoff Report: Identifed the Patient, Identified the Reponsible Provider, Reviewed the pertinent medical history, Discussed the surgical course, Reviewed Intra-OP anesthesia mangement and issues during anesthesia, Set expectations for post-procedure period and Allowed opportunity for questions and acknowledgement of understanding    Vitals:  Vitals Value Taken Time   BP     Temp     Pulse 56 06/19/25 13:52   Resp 15 06/19/25 13:52   SpO2 97 % 06/19/25 13:52   Vitals shown include unfiled device data.    Electronically Signed By: FARHAD Lewis CRNA  June 19, 2025  1:53 PM

## 2025-06-19 NOTE — ANESTHESIA POSTPROCEDURE EVALUATION
Patient: Wilian Ness    Procedure: Procedure(s):  Colonoscopy, Screening       Anesthesia Type:  MAC    Note:     Postop Pain Control: Uneventful            Sign Out: Well controlled pain   PONV: No   Neuro/Psych: Uneventful            Sign Out: Acceptable/Baseline neuro status   Airway/Respiratory: Uneventful            Sign Out: Acceptable/Baseline resp. status   CV/Hemodynamics: Uneventful            Sign Out: Acceptable CV status; No obvious hypovolemia; No obvious fluid overload   Other NRE: NONE   DID A NON-ROUTINE EVENT OCCUR? No           Last vitals:  Vitals Value Taken Time   /56 06/19/25 13:52   Temp     Pulse 61 06/19/25 13:59   Resp 27 06/19/25 13:59   SpO2 96 % 06/19/25 13:59   Vitals shown include unfiled device data.    Electronically Signed By: Sofie Chadwick MD  June 19, 2025  2:00 PM

## 2025-07-02 ENCOUNTER — MYC REFILL (OUTPATIENT)
Dept: FAMILY MEDICINE | Facility: CLINIC | Age: 70
End: 2025-07-02
Payer: MEDICARE

## 2025-07-02 DIAGNOSIS — I25.84 CORONARY ARTERY DISEASE DUE TO CALCIFIED CORONARY LESION: ICD-10-CM

## 2025-07-02 DIAGNOSIS — I25.10 CORONARY ARTERY DISEASE DUE TO CALCIFIED CORONARY LESION: ICD-10-CM

## 2025-07-02 RX ORDER — ASPIRIN 81 MG/1
81 TABLET ORAL
Qty: 90 TABLET | Refills: 3 | Status: SHIPPED | OUTPATIENT
Start: 2025-07-02

## 2025-07-02 NOTE — TELEPHONE ENCOUNTER
"Request for medication refill:    Medication Name:       aspirin 81 MG EC tablet       Patient comment: I need 3 refills until my next physical.       Providers if patient needs an appointment and you are willing to give a one month supply please refill for one month and  send a MyChart using \".SMILLIMITEDREFILL\" .Or route chart to \"P SMI \" . And use the phrase \" SMIRXFOLLOWUP\"To call patient and inform of limited refill and providers request to make an appointment. (Giving one month refill in non controlled medications is strongly recommended before denial)    If refill has been denied, meaning absolutely no refills without visit, please complete the smart phrase \".SMIRXREFUSE\" and route it to the \"P [facility] MED REFILLS\"  pool to inform the patient and the pharmacy.    Ayo Johns MA     "

## 2025-07-03 NOTE — TELEPHONE ENCOUNTER
CC successfully submitted a referral with [facility] for TMJ Pain/Ear Pain through their online referral form. Referred to the [facility].  Patient should be getting a call to schedule the appointment. If patient does not get a call, they can call their clinic at [phone].     Patricia Farrell  Care Coordinator- [facility]

## 2025-07-21 ENCOUNTER — MYC MEDICAL ADVICE (OUTPATIENT)
Dept: FAMILY MEDICINE | Facility: CLINIC | Age: 70
End: 2025-07-21
Payer: MEDICARE

## 2025-07-21 DIAGNOSIS — E78.5 HYPERLIPIDEMIA LDL GOAL <70: ICD-10-CM

## 2025-07-21 DIAGNOSIS — D17.30 LIPOMA OF SKIN AND SUBCUTANEOUS TISSUE: Primary | ICD-10-CM

## 2025-07-22 ENCOUNTER — TRANSFERRED RECORDS (OUTPATIENT)
Dept: HEALTH INFORMATION MANAGEMENT | Facility: CLINIC | Age: 70
End: 2025-07-22
Payer: MEDICARE

## 2025-07-22 RX ORDER — ATORVASTATIN CALCIUM 80 MG/1
80 TABLET, FILM COATED ORAL AT BEDTIME
Qty: 90 TABLET | Refills: 1 | Status: SHIPPED | OUTPATIENT
Start: 2025-07-22

## 2025-07-22 NOTE — TELEPHONE ENCOUNTER
Per pt message he is taking 2 atorvastatin per provider recommendation and has about 2 weeks left before he will run out. Pt will need updated script to reflect current dose. Sending to provider for review.      Annika Buckley RN    Ordering the Atorvastatin 80 mg daily as directed. Provided single 80 mg tab instead of taking 2 40 mg tabs as we were using up his remaining supply.     Requests surgery referral for simple subcutaneous lipoma at right lower neck. It is superficial to right SCM and does not enter thoracic cavity. It would be best managed by dermatology for cosmetic result.     MD [facility], PGY-2  [facility]

## 2025-07-23 ENCOUNTER — PATIENT OUTREACH (OUTPATIENT)
Dept: CARE COORDINATION | Facility: CLINIC | Age: 70
End: 2025-07-23
Payer: MEDICARE

## 2025-07-24 ENCOUNTER — DOCUMENTATION ONLY (OUTPATIENT)
Dept: FAMILY MEDICINE | Facility: CLINIC | Age: 70
End: 2025-07-24
Payer: MEDICARE

## 2025-07-24 NOTE — PROGRESS NOTES
"When opening a documentation only encounter, be sure to enter in \"Chief Complaint\" Forms and in \" Comments\" Title of form, description if needed.    Wilian is a 70 year old  male  Form received via: Fax  Form now resides in: Provider Ready    Lyubov Lemon, REAL               "

## 2025-08-22 ENCOUNTER — MYC REFILL (OUTPATIENT)
Dept: FAMILY MEDICINE | Facility: CLINIC | Age: 70
End: 2025-08-22
Payer: MEDICARE

## 2025-08-22 DIAGNOSIS — I10 ESSENTIAL HYPERTENSION WITH GOAL BLOOD PRESSURE LESS THAN 130/80: ICD-10-CM

## 2025-08-25 RX ORDER — AMLODIPINE BESYLATE 5 MG/1
5 TABLET ORAL DAILY
Qty: 90 TABLET | Refills: 1 | Status: SHIPPED | OUTPATIENT
Start: 2025-08-25